# Patient Record
Sex: MALE | Race: WHITE | Employment: OTHER | URBAN - METROPOLITAN AREA
[De-identification: names, ages, dates, MRNs, and addresses within clinical notes are randomized per-mention and may not be internally consistent; named-entity substitution may affect disease eponyms.]

---

## 2017-01-23 ENCOUNTER — OFFICE VISIT (OUTPATIENT)
Dept: INTERNAL MEDICINE CLINIC | Age: 67
End: 2017-01-23

## 2017-01-23 VITALS
HEART RATE: 105 BPM | OXYGEN SATURATION: 96 % | BODY MASS INDEX: 33.06 KG/M2 | TEMPERATURE: 97.5 F | SYSTOLIC BLOOD PRESSURE: 110 MMHG | DIASTOLIC BLOOD PRESSURE: 78 MMHG | WEIGHT: 210.6 LBS | RESPIRATION RATE: 14 BRPM | HEIGHT: 67 IN

## 2017-01-23 DIAGNOSIS — E78.5 HYPERLIPIDEMIA, UNSPECIFIED HYPERLIPIDEMIA TYPE: ICD-10-CM

## 2017-01-23 DIAGNOSIS — J44.9 CHRONIC OBSTRUCTIVE PULMONARY DISEASE, UNSPECIFIED COPD TYPE (HCC): ICD-10-CM

## 2017-01-23 DIAGNOSIS — F41.9 ANXIETY: ICD-10-CM

## 2017-01-23 DIAGNOSIS — J06.9 UPPER RESPIRATORY TRACT INFECTION, UNSPECIFIED TYPE: ICD-10-CM

## 2017-01-23 DIAGNOSIS — I10 ESSENTIAL HYPERTENSION: Primary | ICD-10-CM

## 2017-01-23 DIAGNOSIS — E03.9 HYPOTHYROIDISM, UNSPECIFIED TYPE: ICD-10-CM

## 2017-01-23 PROCEDURE — 99214 OFFICE O/P EST MOD 30 MIN: CPT | Performed by: INTERNAL MEDICINE

## 2017-01-23 RX ORDER — BROMPHENIRAMINE MALEATE, PSEUDOEPHEDRINE HYDROCHLORIDE, AND DEXTROMETHORPHAN HYDROBROMIDE 2; 30; 10 MG/5ML; MG/5ML; MG/5ML
5 SYRUP ORAL 4 TIMES DAILY PRN
Qty: 120 ML | Refills: 0 | Status: SHIPPED | OUTPATIENT
Start: 2017-01-23 | End: 2017-04-24 | Stop reason: ALTCHOICE

## 2017-01-23 RX ORDER — METHYLPREDNISOLONE 4 MG/1
TABLET ORAL
Qty: 1 KIT | Refills: 0 | Status: SHIPPED | OUTPATIENT
Start: 2017-01-23 | End: 2017-04-24 | Stop reason: ALTCHOICE

## 2017-01-23 ASSESSMENT — PATIENT HEALTH QUESTIONNAIRE - PHQ9
SUM OF ALL RESPONSES TO PHQ QUESTIONS 1-9: 0
2. FEELING DOWN, DEPRESSED OR HOPELESS: 0
SUM OF ALL RESPONSES TO PHQ9 QUESTIONS 1 & 2: 0
1. LITTLE INTEREST OR PLEASURE IN DOING THINGS: 0

## 2017-01-24 RX ORDER — AMLODIPINE BESYLATE AND BENAZEPRIL HYDROCHLORIDE 10; 20 MG/1; MG/1
CAPSULE ORAL
Qty: 90 CAPSULE | Refills: 2 | Status: SHIPPED | OUTPATIENT
Start: 2017-01-24 | End: 2017-10-06 | Stop reason: SDUPTHER

## 2017-01-26 ASSESSMENT — ENCOUNTER SYMPTOMS
CONSTIPATION: 0
SHORTNESS OF BREATH: 0
CHEST TIGHTNESS: 0
SORE THROAT: 0
NAUSEA: 0
COUGH: 0
DIARRHEA: 0
RHINORRHEA: 0
WHEEZING: 0
ABDOMINAL PAIN: 0
VOMITING: 0

## 2017-02-10 LAB
AGE TIME: 66 YRS
ALBUMIN SERPL-MCNC: 3.9 G/DL
ALP BLD-CCNC: 124 U/L
ALT SERPL-CCNC: 32 U/L
ANION GAP SERPL CALCULATED.3IONS-SCNC: 11 MMOL/L
AST SERPL-CCNC: 28 U/L
BILIRUB SERPL-MCNC: 0.55 MG/DL
BUN BLDV-MCNC: 19 MG/DL
CALCIUM SERPL-MCNC: 9.2 MG/DL
CHLORIDE BLD-SCNC: 104 MMOL/L
CHOLESTEROL, TOTAL: 201 MG/DL
CO2: 26.6 MMOL/L
COMPREHENSIVE METABOLIC PANEL: ABNORMAL
CREAT SERPL-MCNC: 1.1 MG/DL
GFR AFRICAN AMERICAN: 81 ML/MIN
GFR NON-AFRICAN AMERICAN: 67 ML/MIN
GLUCOSE BLD-MCNC: 92 MG/DL
HDLC SERPL-MCNC: 86 MG/DL
LDL CHOLESTEROL DIRECT: 62 MG/DL
LIPID PANEL: ABNORMAL
POTASSIUM SERPL-SCNC: 4.2 MMOL/L
SODIUM BLD-SCNC: 142 MMOL/L
THYROTROPIN RELEASING HORMONE: 1.07 UIU/ML
TOTAL PROTEIN: 7.2 G/DL
TRIGL SERPL-MCNC: 264 MG/DL
VLDLC SERPL CALC-MCNC: 53 MG/DL

## 2017-03-01 RX ORDER — ATORVASTATIN CALCIUM 40 MG/1
40 TABLET, FILM COATED ORAL NIGHTLY
Qty: 30 TABLET | Refills: 3 | Status: SHIPPED | OUTPATIENT
Start: 2017-03-01 | End: 2017-07-05 | Stop reason: SDUPTHER

## 2017-03-04 RX ORDER — ALPRAZOLAM 1 MG/1
TABLET ORAL
Qty: 90 TABLET | Refills: 0 | Status: SHIPPED | OUTPATIENT
Start: 2017-03-04 | End: 2017-04-24 | Stop reason: SDUPTHER

## 2017-04-24 ENCOUNTER — OFFICE VISIT (OUTPATIENT)
Dept: INTERNAL MEDICINE CLINIC | Age: 67
End: 2017-04-24

## 2017-04-24 VITALS
SYSTOLIC BLOOD PRESSURE: 104 MMHG | TEMPERATURE: 97.5 F | HEART RATE: 88 BPM | RESPIRATION RATE: 14 BRPM | HEIGHT: 67 IN | DIASTOLIC BLOOD PRESSURE: 64 MMHG | OXYGEN SATURATION: 97 % | BODY MASS INDEX: 32.96 KG/M2 | WEIGHT: 210 LBS

## 2017-04-24 DIAGNOSIS — Z00.00 ROUTINE GENERAL MEDICAL EXAMINATION AT A HEALTH CARE FACILITY: Primary | ICD-10-CM

## 2017-04-24 DIAGNOSIS — J30.9 ALLERGIC RHINITIS, UNSPECIFIED ALLERGIC RHINITIS TRIGGER, UNSPECIFIED RHINITIS SEASONALITY: ICD-10-CM

## 2017-04-24 DIAGNOSIS — Z12.5 SCREENING PSA (PROSTATE SPECIFIC ANTIGEN): ICD-10-CM

## 2017-04-24 DIAGNOSIS — R94.31 ABNORMAL EKG: ICD-10-CM

## 2017-04-24 DIAGNOSIS — E78.5 HYPERLIPIDEMIA, UNSPECIFIED HYPERLIPIDEMIA TYPE: ICD-10-CM

## 2017-04-24 DIAGNOSIS — F41.9 ANXIETY: ICD-10-CM

## 2017-04-24 DIAGNOSIS — Z13.6 SCREENING, ISCHEMIC HEART DISEASE: ICD-10-CM

## 2017-04-24 DIAGNOSIS — Z23 NEED FOR TDAP VACCINATION: ICD-10-CM

## 2017-04-24 DIAGNOSIS — J44.9 CHRONIC OBSTRUCTIVE PULMONARY DISEASE, UNSPECIFIED COPD TYPE (HCC): ICD-10-CM

## 2017-04-24 DIAGNOSIS — I10 ESSENTIAL HYPERTENSION: ICD-10-CM

## 2017-04-24 DIAGNOSIS — E03.9 HYPOTHYROIDISM, UNSPECIFIED TYPE: ICD-10-CM

## 2017-04-24 LAB
BILIRUBIN, POC: NORMAL
BLOOD URINE, POC: NORMAL
CLARITY, POC: NORMAL
COLOR, POC: NORMAL
GLUCOSE URINE, POC: NORMAL
KETONES, POC: NORMAL
LEUKOCYTE EST, POC: NORMAL
NITRITE, POC: NORMAL
PH, POC: 7.5
PROTEIN, POC: NORMAL
SPECIFIC GRAVITY, POC: 1.02
UROBILINOGEN, POC: 0.2

## 2017-04-24 PROCEDURE — 99397 PER PM REEVAL EST PAT 65+ YR: CPT | Performed by: INTERNAL MEDICINE

## 2017-04-24 PROCEDURE — 93000 ELECTROCARDIOGRAM COMPLETE: CPT | Performed by: INTERNAL MEDICINE

## 2017-04-24 PROCEDURE — 81002 URINALYSIS NONAUTO W/O SCOPE: CPT | Performed by: INTERNAL MEDICINE

## 2017-04-24 RX ORDER — ALPRAZOLAM 1 MG/1
TABLET ORAL
Qty: 90 TABLET | Refills: 1 | Status: SHIPPED | OUTPATIENT
Start: 2017-04-24 | End: 2017-07-27 | Stop reason: SDUPTHER

## 2017-04-24 RX ORDER — LORATADINE 10 MG/1
10 TABLET ORAL DAILY
Qty: 30 TABLET | Refills: 1 | Status: SHIPPED | OUTPATIENT
Start: 2017-04-24 | End: 2017-11-13 | Stop reason: ALTCHOICE

## 2017-04-24 ASSESSMENT — ENCOUNTER SYMPTOMS
WHEEZING: 1
EYE ITCHING: 0
CHOKING: 0
SHORTNESS OF BREATH: 1
CONSTIPATION: 0
ABDOMINAL DISTENTION: 0
ABDOMINAL PAIN: 0
EYE PAIN: 0
FACIAL SWELLING: 0
CHEST TIGHTNESS: 0
VOICE CHANGE: 0
RHINORRHEA: 1
APNEA: 0
SORE THROAT: 0
DIARRHEA: 0
TROUBLE SWALLOWING: 0
EYE REDNESS: 0
PHOTOPHOBIA: 0
BACK PAIN: 0
VOMITING: 0
SINUS PRESSURE: 0
ANAL BLEEDING: 0
COUGH: 1
NAUSEA: 0
BLOOD IN STOOL: 0
RECTAL PAIN: 0
EYE DISCHARGE: 0

## 2017-05-22 RX ORDER — LEVOTHYROXINE SODIUM 0.12 MG/1
TABLET ORAL
Qty: 30 TABLET | Refills: 5 | Status: SHIPPED | OUTPATIENT
Start: 2017-05-22 | End: 2017-11-29 | Stop reason: SDUPTHER

## 2017-07-05 RX ORDER — ATORVASTATIN CALCIUM 40 MG/1
TABLET, FILM COATED ORAL
Qty: 30 TABLET | Refills: 3 | Status: SHIPPED | OUTPATIENT
Start: 2017-07-05 | End: 2018-02-13 | Stop reason: SDUPTHER

## 2017-07-27 ENCOUNTER — OFFICE VISIT (OUTPATIENT)
Dept: INTERNAL MEDICINE CLINIC | Age: 67
End: 2017-07-27

## 2017-07-27 VITALS
SYSTOLIC BLOOD PRESSURE: 120 MMHG | TEMPERATURE: 97.9 F | HEART RATE: 90 BPM | BODY MASS INDEX: 32.96 KG/M2 | HEIGHT: 67 IN | RESPIRATION RATE: 12 BRPM | DIASTOLIC BLOOD PRESSURE: 74 MMHG | WEIGHT: 210 LBS | OXYGEN SATURATION: 96 %

## 2017-07-27 DIAGNOSIS — F41.9 ANXIETY: ICD-10-CM

## 2017-07-27 DIAGNOSIS — E78.5 HYPERLIPIDEMIA, UNSPECIFIED HYPERLIPIDEMIA TYPE: ICD-10-CM

## 2017-07-27 DIAGNOSIS — E03.9 HYPOTHYROIDISM, UNSPECIFIED TYPE: ICD-10-CM

## 2017-07-27 DIAGNOSIS — Z02.83 ENCOUNTER FOR DRUG SCREENING: ICD-10-CM

## 2017-07-27 DIAGNOSIS — Z23 NEED FOR TDAP VACCINATION: ICD-10-CM

## 2017-07-27 DIAGNOSIS — I10 ESSENTIAL HYPERTENSION: Primary | ICD-10-CM

## 2017-07-27 PROCEDURE — 99214 OFFICE O/P EST MOD 30 MIN: CPT | Performed by: INTERNAL MEDICINE

## 2017-07-27 RX ORDER — ALPRAZOLAM 1 MG/1
TABLET ORAL
Qty: 75 TABLET | Refills: 2 | Status: SHIPPED | OUTPATIENT
Start: 2017-07-27 | End: 2017-11-29 | Stop reason: SDUPTHER

## 2017-07-27 RX ORDER — ALPRAZOLAM 1 MG/1
TABLET ORAL
Qty: 90 TABLET | Refills: 1 | Status: CANCELLED | OUTPATIENT
Start: 2017-07-27

## 2017-07-27 ASSESSMENT — ENCOUNTER SYMPTOMS
WHEEZING: 0
CONSTIPATION: 0
CHEST TIGHTNESS: 0
SORE THROAT: 0
NAUSEA: 0
DIARRHEA: 0
ABDOMINAL PAIN: 0
VOMITING: 0
TROUBLE SWALLOWING: 0
RHINORRHEA: 0
COUGH: 1
SHORTNESS OF BREATH: 0

## 2017-08-01 LAB
6-ACETYLMORPHINE: NOT DETECTED
7-AMINOCLONAZEPAM: NOT DETECTED
ALPHA-OH-ALPRAZOLAM: PRESENT
ALPRAZOLAM: PRESENT
AMPHETAMINE: NOT DETECTED
BARBITURATES: NOT DETECTED
BENZOYLECGONINE: NOT DETECTED
BUPRENORPHINE: NOT DETECTED
CARISOPRODOL: NOT DETECTED
CLONAZEPAM: NOT DETECTED
CODEINE: NOT DETECTED
CREATININE URINE: 185.4 MG/DL (ref 20–400)
DIAZEPAM: NOT DETECTED
DRUGS EXPECTED: NORMAL
EER PAIN MGT DRUG PANEL, HIGH RES/EMIT U: NORMAL
ETHYL GLUCURONIDE: PRESENT
FENTANYL: NOT DETECTED
HYDROCODONE: NOT DETECTED
HYDROMORPHONE: NOT DETECTED
LORAZEPAM: NOT DETECTED
MARIJUANA METABOLITE: NOT DETECTED
MDA: NOT DETECTED
MDEA: NOT DETECTED
MDMA URINE: NOT DETECTED
MEPERIDINE: NOT DETECTED
METHADONE: NOT DETECTED
METHAMPHETAMINE: NOT DETECTED
METHYLPHENIDATE: NOT DETECTED
MIDAZOLAM: NOT DETECTED
MORPHINE: NOT DETECTED
NORBUPRENORPHINE, FREE: NOT DETECTED
NORDIAZEPAM: NOT DETECTED
NORFENTANYL: NOT DETECTED
NORHYDROCODONE, URINE: NOT DETECTED
NOROXYCODONE: NOT DETECTED
NOROXYMORPHONE, URINE: NOT DETECTED
OXAZEPAM: NOT DETECTED
OXYCODONE: NOT DETECTED
OXYMORPHONE: NOT DETECTED
PAIN MANAGEMENT DRUG PANEL: NORMAL
PAIN MANAGEMENT DRUG PANEL: NORMAL
PCP: NOT DETECTED
PHENTERMINE: NOT DETECTED
PROPOXYPHENE: NOT DETECTED
TAPENTADOL, URINE: NOT DETECTED
TAPENTADOL-O-SULFATE, URINE: NOT DETECTED
TEMAZEPAM: NOT DETECTED
TRAMADOL: NOT DETECTED
ZOLPIDEM: NOT DETECTED

## 2017-10-09 RX ORDER — AMLODIPINE BESYLATE AND BENAZEPRIL HYDROCHLORIDE 10; 20 MG/1; MG/1
CAPSULE ORAL
Qty: 90 CAPSULE | Refills: 2 | Status: SHIPPED | OUTPATIENT
Start: 2017-10-09 | End: 2018-04-04 | Stop reason: ALTCHOICE

## 2017-11-07 ENCOUNTER — OFFICE VISIT (OUTPATIENT)
Dept: DERMATOLOGY | Age: 67
End: 2017-11-07

## 2017-11-07 DIAGNOSIS — R21 RASH: Primary | ICD-10-CM

## 2017-11-07 PROCEDURE — 11101 PR BIOPSY, EACH ADDED LESION: CPT | Performed by: DERMATOLOGY

## 2017-11-07 PROCEDURE — 11100 PR BIOPSY OF SKIN LESION: CPT | Performed by: DERMATOLOGY

## 2017-11-07 PROCEDURE — 99213 OFFICE O/P EST LOW 20 MIN: CPT | Performed by: DERMATOLOGY

## 2017-11-07 NOTE — PATIENT INSTRUCTIONS

## 2017-11-07 NOTE — PROGRESS NOTES
aluminum chloride. The wound(s) were dressed with petrolatum and covered with a bandage. Wound care instructions were reviewed. 2 Specimen (s) sent to pathology. The specimen bottles were appropriately labeled. We also reviewed the risks of bleeding, scar, and infection. Continue use of CeraVe cream.    Lidex cream twice daily to red scaly patches until improved.

## 2017-11-13 ENCOUNTER — OFFICE VISIT (OUTPATIENT)
Dept: INTERNAL MEDICINE CLINIC | Age: 67
End: 2017-11-13

## 2017-11-13 VITALS
TEMPERATURE: 97.6 F | OXYGEN SATURATION: 97 % | SYSTOLIC BLOOD PRESSURE: 120 MMHG | DIASTOLIC BLOOD PRESSURE: 70 MMHG | HEART RATE: 101 BPM | HEIGHT: 67 IN | RESPIRATION RATE: 14 BRPM | BODY MASS INDEX: 33.4 KG/M2 | WEIGHT: 212.8 LBS

## 2017-11-13 DIAGNOSIS — R53.83 FATIGUE, UNSPECIFIED TYPE: ICD-10-CM

## 2017-11-13 DIAGNOSIS — R14.0 ABDOMINAL DISTENSION: ICD-10-CM

## 2017-11-13 DIAGNOSIS — N48.89 PENILE PAIN: ICD-10-CM

## 2017-11-13 DIAGNOSIS — F41.9 ANXIETY: ICD-10-CM

## 2017-11-13 DIAGNOSIS — E03.9 HYPOTHYROIDISM, UNSPECIFIED TYPE: ICD-10-CM

## 2017-11-13 DIAGNOSIS — J44.9 CHRONIC OBSTRUCTIVE PULMONARY DISEASE, UNSPECIFIED COPD TYPE (HCC): ICD-10-CM

## 2017-11-13 DIAGNOSIS — I10 ESSENTIAL HYPERTENSION: Primary | ICD-10-CM

## 2017-11-13 DIAGNOSIS — R06.02 SHORTNESS OF BREATH: ICD-10-CM

## 2017-11-13 DIAGNOSIS — E78.2 MIXED HYPERLIPIDEMIA: ICD-10-CM

## 2017-11-13 DIAGNOSIS — K43.9 VENTRAL HERNIA WITHOUT OBSTRUCTION OR GANGRENE: ICD-10-CM

## 2017-11-13 DIAGNOSIS — R05.9 COUGH: ICD-10-CM

## 2017-11-13 DIAGNOSIS — R10.33 UMBILICAL PAIN: ICD-10-CM

## 2017-11-13 PROCEDURE — 99214 OFFICE O/P EST MOD 30 MIN: CPT | Performed by: INTERNAL MEDICINE

## 2017-11-13 RX ORDER — ALBUTEROL SULFATE 2.5 MG/3ML
2.5 SOLUTION RESPIRATORY (INHALATION) EVERY 6 HOURS PRN
COMMUNITY
End: 2021-12-16 | Stop reason: SDUPTHER

## 2017-11-13 RX ORDER — GUAIFENESIN AND DEXTROMETHORPHAN HYDROBROMIDE 600; 30 MG/1; MG/1
1 TABLET, EXTENDED RELEASE ORAL 2 TIMES DAILY
Qty: 20 TABLET | Refills: 0 | Status: SHIPPED | OUTPATIENT
Start: 2017-11-13 | End: 2018-04-04

## 2017-11-13 RX ORDER — BUDESONIDE AND FORMOTEROL FUMARATE DIHYDRATE 160; 4.5 UG/1; UG/1
2 AEROSOL RESPIRATORY (INHALATION) 2 TIMES DAILY
Qty: 1 INHALER | Refills: 1 | Status: SHIPPED | OUTPATIENT
Start: 2017-11-13 | End: 2018-01-26 | Stop reason: SDUPTHER

## 2017-11-13 NOTE — PATIENT INSTRUCTIONS
-chest xray  -CT abdomen pelvis  -Have fasting labs done  -Continue same medications  -Increase Symbicort HFA inhaler to 160/4.5mcg 2 puffs twice daily  -Start Medrol dose pack  -Mucinex DM 1 tablet 2 times daily for cough  -Follow up with Pulmonary  -Referral to General Surgery

## 2017-11-13 NOTE — PROGRESS NOTES
Jolene Malhotra   YOB: 1950    Date of Visit:  11/13/2017    Chief Complaint   Patient presents with    Anxiety    Hyperlipidemia     not fasting    Hypertension    Hypothyroidism    Cough     x 1 mo: feels pulling in abdomen (has hernia) and tip of penis when he coughs. Pulmonary appt next week       HPI  SOB for awhile since May. Pt has albuterol nebulizer 1-2 times per day. Pt c/o wheezing. Runny nose, congestion in this throat and cough  Uses Symbicort inhaler 2 times daily    Anxiety increased pending doom. Doesn't feel good  Can't lie flat if he doesn't use his nebulizer. If lying flat and coughs tip of penis tip mildly hurts with cough  Pt c/o extreme fatigue since May. Doesn't feel like doing anything    Abdomen distended  Shooting pain umbilical    Review of Systems   Constitutional: Positive for fatigue. Negative for chills and fever. HENT: Positive for rhinorrhea. Negative for congestion, postnasal drip and sore throat. Eyes: Negative for visual disturbance. Respiratory: Positive for cough, shortness of breath and wheezing. Negative for chest tightness. Cardiovascular: Negative for chest pain, palpitations and leg swelling. Gastrointestinal: Positive for abdominal distention and abdominal pain. Negative for constipation, diarrhea, nausea and vomiting. Genitourinary: Negative for dysuria, frequency and hematuria. Musculoskeletal: Negative for arthralgias and myalgias. Neurological: Negative for dizziness, syncope, light-headedness, numbness and headaches. Psychiatric/Behavioral: Negative for dysphoric mood and sleep disturbance. The patient is nervous/anxious.         Allergies   Allergen Reactions    Latex Rash    Contrast [Gadolinium Derivatives] Shortness Of Breath and Anxiety    Soap Rash     Outpatient Prescriptions Marked as Taking for the 11/13/17 encounter (Office Visit) with Josie Bain MD   Medication Sig Dispense Refill    albuterol (PROVENTIL) (2.5 MG/3ML) 0.083% nebulizer solution Take 2.5 mg by nebulization every 6 hours as needed for Wheezing or Shortness of Breath      fluocinonide (LIDEX) 0.05 % cream Apply to affected areas twice daily for up to 2 weeks or until improved. 60 g 3    amLODIPine-benazepril (LOTREL) 10-20 MG per capsule TAKE 1 CAPSULE BY MOUTH DAILY. 90 capsule 2    ALPRAZolam (XANAX) 1 MG tablet TAKE 1 TABLET BY MOUTH 3 TIMES A DAY AS NEEDED FOR ANXIETY 75 tablet 2    atorvastatin (LIPITOR) 40 MG tablet TAKE 1 TABLET BY MOUTH NIGHTLY 30 tablet 3    levothyroxine (SYNTHROID) 125 MCG tablet TAKE 1 TABLET BY MOUTH DAILY 30 tablet 5    triamcinolone (KENALOG) 0.1 % cream Apply to affecteds area twice daily for up to 2 weeks or until improved. For eczema. 80 g 2    ibuprofen (ADVIL;MOTRIN) 800 MG tablet Take 1 tablet by mouth every 8 hours as needed 60 tablet 0    ascorbic acid (VITAMIN C) 500 MG tablet Take 500 mg by mouth Twice a Week       nicotine (NICODERM CQ) 21 MG/24HR Place 1 patch onto the skin every 24 hours      Multiple Vitamins-Minerals (CENTRUM SILVER ADULT 50+ PO) Take 1 tablet by mouth daily      aspirin 81 MG tablet Take 1 tablet by mouth daily. 30 tablet 5    Budesonide-Formoterol Fumarate (SYMBICORT) 80-4.5 MCG/ACT AERO Inhale 1 puff into the lungs 2 times daily.  albuterol (PROAIR HFA) 108 (90 BASE) MCG/ACT inhaler Inhale 2 puffs into the lungs every 6 hours as needed. Vitals:    11/13/17 1055   BP: 120/70   Site: Right Arm   Position: Sitting   Cuff Size: Large Adult   Pulse: 101   Resp: 14   Temp: 97.6 °F (36.4 °C)   TempSrc: Oral   SpO2: 97%   Weight: 212 lb 12.8 oz (96.5 kg)   Height: 5' 7\" (1.702 m)     Body mass index is 33.33 kg/m². Physical Exam   Constitutional: He appears well-developed and well-nourished. No distress.    Obese elderly WM   HENT:   Right Ear: Tympanic membrane and ear canal normal.   Left Ear: Tympanic membrane and ear canal normal.   Nose: Right sinus exhibits no maxillary sinus tenderness. Left sinus exhibits no maxillary sinus tenderness. Mouth/Throat: Oropharynx is clear and moist and mucous membranes are normal.   Eyes: Conjunctivae, EOM and lids are normal. Pupils are equal, round, and reactive to light. Neck: Neck supple. Carotid bruit is not present. No thyromegaly present. Cardiovascular: Normal rate, regular rhythm, S1 normal, S2 normal and normal heart sounds. Exam reveals no gallop and no friction rub. No murmur heard. Pulmonary/Chest: Effort normal and breath sounds normal. No respiratory distress. He has no wheezes. He has no rhonchi. He has no rales. Abdominal: Soft. Normal appearance and bowel sounds are normal. He exhibits no distension. There is no hepatosplenomegaly. There is tenderness in the periumbilical area. There is no rebound and no guarding. A hernia is present. Hernia confirmed positive in the ventral area. Hernia confirmed negative in the right inguinal area and confirmed negative in the left inguinal area. Genitourinary: Testes normal and penis normal.   Musculoskeletal: He exhibits no edema. Lymphadenopathy:        Head (right side): No submandibular adenopathy present. Head (left side): No submandibular adenopathy present. Psychiatric: He has a normal mood and affect. Nursing note reviewed. Assessment/Plan     1. Essential hypertension    - Comprehensive Metabolic Panel; Future    2. Anxiety      3. Hypothyroidism, unspecified type    - TSH without Reflex; Future    4. Mixed hyperlipidemia    - Comprehensive Metabolic Panel; Future  - Lipid Panel; Future    5. Shortness of breath    - XR CHEST STANDARD (2 VW); Future    6. Chronic obstructive pulmonary disease, unspecified COPD type (HCC)    - budesonide-formoterol (SYMBICORT) 160-4.5 MCG/ACT AERO; Inhale 2 puffs into the lungs 2 times daily  Dispense: 1 Inhaler; Refill: 1    7.  Fatigue, unspecified type    - CBC Auto Differential; Future  - Comprehensive Metabolic Panel; Future  - Vitamin B12; Future    8. Penile pain      9. Abdominal distension    - CT ABDOMEN PELVIS W IV CONTRAST; Future    10. Umbilical pain    - CT ABDOMEN PELVIS W IV CONTRAST; Future  - Taz Lopez MD    11. Cough    - Dextromethorphan-Guaifenesin (MUCINEX DM)  MG TB12; Take 1 tablet by mouth 2 times daily  Dispense: 20 tablet; Refill: 0    12. Ventral hernia without obstruction or gangrene  - Taz Lopez MD      Discussed medications with patient, who voiced understanding of their use and indications. All questions answered. Return in about 1 week (around 11/20/2017) for shortness of breath, abdominal distension, and results.

## 2017-11-14 ENCOUNTER — TELEPHONE (OUTPATIENT)
Dept: INTERNAL MEDICINE CLINIC | Age: 67
End: 2017-11-14

## 2017-11-14 NOTE — TELEPHONE ENCOUNTER
Bethanie from [facility] Scheduling called to request PA needed for CT of the Abdomen/Pelvis w/Contrast per patient insurance  Phone: [phone]  Fax: [phone]

## 2017-11-15 ENCOUNTER — TELEPHONE (OUTPATIENT)
Dept: DERMATOLOGY | Age: 67
End: 2017-11-15

## 2017-11-15 NOTE — TELEPHONE ENCOUNTER
Bethanie from [facility] calling back to check the status of the PA she called about yesterday. States she received call from patient this morning wanting to schedule for the Ct.   She advised that PA could be faxed to her attention @ [phone]

## 2017-11-15 NOTE — TELEPHONE ENCOUNTER
[employer] for the PA. Could not get the approval and it will be turned over to a Medicare Nurse for review. Called patient to let him know the PA for the CT scan is on hold for now.

## 2017-11-15 NOTE — TELEPHONE ENCOUNTER
Reviewed results of the biopsy with the patient. Date of biopsy: 11/7/17  Site of biopsy: R medial thigh  Result: Spongiotic dermatitis    Plan: Follow up in 3 months, pt scheduled. Date of biopsy: 11/7/17  Site of biopsy: L medial thigh  Result: Spongiotic dermatitis    Plan: Follow up in 3 months    The patient expressed understanding of the plan.

## 2017-11-19 ASSESSMENT — ENCOUNTER SYMPTOMS
NAUSEA: 0
ABDOMINAL PAIN: 1
VOMITING: 0
ABDOMINAL DISTENTION: 1
RHINORRHEA: 1
WHEEZING: 1
DIARRHEA: 0
SORE THROAT: 0
CONSTIPATION: 0
SHORTNESS OF BREATH: 1
COUGH: 1
CHEST TIGHTNESS: 0

## 2017-11-20 ENCOUNTER — OFFICE VISIT (OUTPATIENT)
Dept: INTERNAL MEDICINE CLINIC | Age: 67
End: 2017-11-20

## 2017-11-20 VITALS
TEMPERATURE: 97.9 F | RESPIRATION RATE: 12 BRPM | OXYGEN SATURATION: 97 % | SYSTOLIC BLOOD PRESSURE: 110 MMHG | BODY MASS INDEX: 33.34 KG/M2 | HEIGHT: 67 IN | WEIGHT: 212.4 LBS | DIASTOLIC BLOOD PRESSURE: 70 MMHG | HEART RATE: 98 BPM

## 2017-11-20 DIAGNOSIS — R53.83 FATIGUE, UNSPECIFIED TYPE: ICD-10-CM

## 2017-11-20 DIAGNOSIS — J44.9 CHRONIC OBSTRUCTIVE PULMONARY DISEASE, UNSPECIFIED COPD TYPE (HCC): ICD-10-CM

## 2017-11-20 DIAGNOSIS — R06.02 SHORTNESS OF BREATH: Primary | ICD-10-CM

## 2017-11-20 PROCEDURE — 99213 OFFICE O/P EST LOW 20 MIN: CPT | Performed by: INTERNAL MEDICINE

## 2017-11-20 NOTE — PROGRESS NOTES
Kinsey Rocha   YOB: 1950    Date of Visit:  11/20/2017    Chief Complaint   Patient presents with    Shortness of Breath    Other     abdominal distention       HPI  Pt has COPD. Pt still has SOB and wheezing. Pt states he was winded over the weekend. Pt states he didn't get out of bed on Saturday due to shortness of breath. Pt had the chest xray done. Pt states he has an appointment with his Pulmonologist, Dr. Sherie Flowers scheduled for 12 noon today. Pt didn't increase the dose of Symbicort as prescribed. Pt never picked up the Rx for Symbicort inhaler sent on 11/13/17. Pt states he didn't do the Medrol dose pack as recommended. Pt didn't take Mucinex for cough. Pt has abdominal distension and ventral hernia. Pt states he hasn't been able to have the CT abdomen pelvis done yet. Pt c/o fatigue. Pt is not exercising. Review of Systems   Constitutional: Positive for fatigue. Negative for chills and fever. HENT: Positive for rhinorrhea. Negative for congestion, postnasal drip and sore throat. Respiratory: Positive for cough, shortness of breath and wheezing. Negative for chest tightness. Cardiovascular: Negative for chest pain, palpitations and leg swelling. Gastrointestinal: Positive for abdominal distention and abdominal pain. Negative for constipation, diarrhea, nausea and vomiting. Neurological: Negative for dizziness and headaches.        Allergies   Allergen Reactions    Latex Rash    Contrast [Gadolinium Derivatives] Shortness Of Breath and Anxiety    Soap Rash     Outpatient Prescriptions Marked as Taking for the 11/20/17 encounter (Office Visit) with Ant Gonzalez MD   Medication Sig Dispense Refill    albuterol (PROVENTIL) (2.5 MG/3ML) 0.083% nebulizer solution Take 2.5 mg by nebulization every 6 hours as needed for Wheezing or Shortness of Breath      budesonide-formoterol (SYMBICORT) 160-4.5 MCG/ACT AERO Inhale 2 puffs into the lungs 2 times daily 1 Inhaler 1    Dextromethorphan-Guaifenesin (MUCINEX DM)  MG TB12 Take 1 tablet by mouth 2 times daily 20 tablet 0    fluocinonide (LIDEX) 0.05 % cream Apply to affected areas twice daily for up to 2 weeks or until improved. 60 g 3    amLODIPine-benazepril (LOTREL) 10-20 MG per capsule TAKE 1 CAPSULE BY MOUTH DAILY. 90 capsule 2    ALPRAZolam (XANAX) 1 MG tablet TAKE 1 TABLET BY MOUTH 3 TIMES A DAY AS NEEDED FOR ANXIETY 75 tablet 2    atorvastatin (LIPITOR) 40 MG tablet TAKE 1 TABLET BY MOUTH NIGHTLY 30 tablet 3    levothyroxine (SYNTHROID) 125 MCG tablet TAKE 1 TABLET BY MOUTH DAILY 30 tablet 5    triamcinolone (KENALOG) 0.1 % cream Apply to affecteds area twice daily for up to 2 weeks or until improved. For eczema. 80 g 2    ibuprofen (ADVIL;MOTRIN) 800 MG tablet Take 1 tablet by mouth every 8 hours as needed 60 tablet 0    ascorbic acid (VITAMIN C) 500 MG tablet Take 500 mg by mouth Twice a Week       nicotine (NICODERM CQ) 21 MG/24HR Place 1 patch onto the skin every 24 hours      Multiple Vitamins-Minerals (CENTRUM SILVER ADULT 50+ PO) Take 1 tablet by mouth daily      aspirin 81 MG tablet Take 1 tablet by mouth daily. 30 tablet 5    albuterol (PROAIR HFA) 108 (90 BASE) MCG/ACT inhaler Inhale 2 puffs into the lungs every 6 hours as needed. Vitals:    11/20/17 0851   BP: 110/70   Site: Right Arm   Position: Sitting   Cuff Size: Large Adult   Pulse: 98   Resp: 12   Temp: 97.9 °F (36.6 °C)   TempSrc: Oral   SpO2: 97%   Weight: 212 lb 6.4 oz (96.3 kg)   Height: 5' 7\" (1.702 m)     Body mass index is 33.27 kg/m². Physical Exam   Constitutional: He appears well-developed and well-nourished. No distress.    Obese elderly WM   HENT:   Right Ear: Tympanic membrane and ear canal normal.   Left Ear: Tympanic membrane and ear canal normal.   Mouth/Throat: Oropharynx is clear and moist and mucous membranes are normal.   Eyes: Conjunctivae, EOM and lids are normal. Pupils are equal, round, 10^3/uL Final 11/14/2017  9:41 AM [facility]   Absolute Mono # 0.9   0.0 - 0.8 10^3/uL Final 11/14/2017  9:41 AM [facility]   Absolute Eos # 0.2  0.0 - 0.5 10^3/uL Final 11/14/2017  9:41 AM [facility]   Absolute Baso # 0.0  0.0 - 0.2 10^3/uL Final 11/14/2017  9:41 AM [facility]       Assessment/Plan     1. Shortness of breath  -Continue same medications  -Follow up with Pulmonary as scheduled     2. Chronic obstructive pulmonary disease, unspecified COPD type ([facility] [address]  -Continue same medications  -Follow up with Pulmonary as scheduled     3. Fatigue, unspecified type  -start over the counter vitamin B12 500mcg once daily  -Regular aerobic exercise        Return in about 4 weeks (around 12/18/2017) for fatigue .

## 2017-11-20 NOTE — PATIENT INSTRUCTIONS
-Continue same medications  -Follow up with Pulmonary  -start over the counter vitamin B12 500mcg once daily  -Regular aerobic exercise

## 2017-11-24 ENCOUNTER — TELEPHONE (OUTPATIENT)
Dept: INTERNAL MEDICINE CLINIC | Age: 67
End: 2017-11-24

## 2017-11-26 PROBLEM — R06.02 SHORTNESS OF BREATH: Status: ACTIVE | Noted: 2017-11-26

## 2017-11-26 PROBLEM — R53.83 FATIGUE: Status: ACTIVE | Noted: 2017-11-26

## 2017-11-26 ASSESSMENT — ENCOUNTER SYMPTOMS
SHORTNESS OF BREATH: 1
VOMITING: 0
ABDOMINAL DISTENTION: 1
WHEEZING: 1
COUGH: 1
SORE THROAT: 0
CHEST TIGHTNESS: 0
ABDOMINAL PAIN: 1
CONSTIPATION: 0
NAUSEA: 0
DIARRHEA: 0
RHINORRHEA: 1

## 2017-11-29 DIAGNOSIS — F41.9 ANXIETY: ICD-10-CM

## 2017-11-29 RX ORDER — LEVOTHYROXINE SODIUM 0.12 MG/1
TABLET ORAL
Qty: 30 TABLET | Refills: 3 | Status: SHIPPED | OUTPATIENT
Start: 2017-11-29 | End: 2018-03-25 | Stop reason: SDUPTHER

## 2017-11-29 RX ORDER — ALPRAZOLAM 1 MG/1
TABLET ORAL
Qty: 75 TABLET | Refills: 1 | Status: SHIPPED | OUTPATIENT
Start: 2017-11-29 | End: 2018-03-12 | Stop reason: SDUPTHER

## 2017-11-30 NOTE — TELEPHONE ENCOUNTER
Rx's refilled. Controlled Substances Monitoring:     Attestation: The Prescription Monitoring Report for this patient was reviewed today. Devin Adames MD)  Documentation: No signs of potential drug abuse or diversion identified.  Devin Adames MD)

## 2018-01-22 ENCOUNTER — OFFICE VISIT (OUTPATIENT)
Dept: INTERNAL MEDICINE CLINIC | Age: 68
End: 2018-01-22

## 2018-01-22 VITALS
RESPIRATION RATE: 16 BRPM | WEIGHT: 214.6 LBS | TEMPERATURE: 97.9 F | OXYGEN SATURATION: 96 % | SYSTOLIC BLOOD PRESSURE: 110 MMHG | BODY MASS INDEX: 33.68 KG/M2 | HEIGHT: 67 IN | HEART RATE: 96 BPM | DIASTOLIC BLOOD PRESSURE: 60 MMHG

## 2018-01-22 DIAGNOSIS — F41.1 GENERALIZED ANXIETY DISORDER: Primary | ICD-10-CM

## 2018-01-22 DIAGNOSIS — F41.0 PANIC ATTACKS: ICD-10-CM

## 2018-01-22 PROCEDURE — 99213 OFFICE O/P EST LOW 20 MIN: CPT | Performed by: INTERNAL MEDICINE

## 2018-01-22 RX ORDER — PAROXETINE 10 MG/1
10 TABLET, FILM COATED ORAL DAILY
Qty: 30 TABLET | Refills: 0 | Status: SHIPPED | OUTPATIENT
Start: 2018-01-22 | End: 2018-03-12

## 2018-01-22 ASSESSMENT — ENCOUNTER SYMPTOMS
SORE THROAT: 0
DIARRHEA: 0
COUGH: 0
CHEST TIGHTNESS: 0
NAUSEA: 0
VOMITING: 0
SHORTNESS OF BREATH: 0
RHINORRHEA: 0
CONSTIPATION: 0
ABDOMINAL PAIN: 0

## 2018-01-22 NOTE — PROGRESS NOTES
Leana Ma   YOB: 1950    Date of Visit:  1/22/2018    Chief Complaint   Patient presents with    Other     unsure of how to proceed after seeing the lung specialist.    Fatigue       HPI  Episode of SOB at [facility]. Pt states he was diagnosed with acute exacerbation of COPD and asthma. Pt states he has done 2 rounds of Prednisone and Albuterol nebulizer. Pt states he uses Symbicort inhaler twice daily. Pt c/o increased anxiety. Pt states he is always coughing,     Pt states he gets very panicky  Pt worries a lot    Xanax 1mg at 6pm , 8pm and 11pm--12 am    Review of Systems   Constitutional: Negative for chills and fever. HENT: Negative for congestion, postnasal drip, rhinorrhea and sore throat. Eyes: Negative for visual disturbance. Respiratory: Negative for cough, chest tightness and shortness of breath. Cardiovascular: Negative for chest pain, palpitations and leg swelling. Gastrointestinal: Negative for abdominal pain, constipation, diarrhea, nausea and vomiting. Genitourinary: Negative for dysuria and frequency. Neurological: Negative for dizziness, light-headedness and headaches.        Allergies   Allergen Reactions    Latex Rash    Contrast [Gadolinium Derivatives] Shortness Of Breath and Anxiety    Soap Rash     Outpatient Prescriptions Marked as Taking for the 1/22/18 encounter (Office Visit) with Eb Alonso MD   Medication Sig Dispense Refill    ALPRAZolam (XANAX) 1 MG tablet TAKE 1 TABLET BY MOUTH 3 TIMES A DAY AS NEEDED FOR ANXIETY 75 tablet 1    levothyroxine (SYNTHROID) 125 MCG tablet TAKE 1 TABLET BY MOUTH DAILY 30 tablet 3    albuterol (PROVENTIL) (2.5 MG/3ML) 0.083% nebulizer solution Take 2.5 mg by nebulization every 6 hours as needed for Wheezing or Shortness of Breath      budesonide-formoterol (SYMBICORT) 160-4.5 MCG/ACT AERO Inhale 2 puffs into the lungs 2 times daily 1 Inhaler 1    amLODIPine-benazepril (LOTREL) Dispense: 30 tablet; Refill: 0      Discussed medications with patient, who voiced understanding of their use and indications. All questions answered. Return in about 3 weeks (around 2/12/2018) for anxiety and panic attacks.

## 2018-01-22 NOTE — PATIENT INSTRUCTIONS
good friend or family member, or join a support group for people with similar problems. Talking to others sometimes relieves stress. · Get involved in social groups, or volunteer to help others. Being alone sometimes makes things seem worse than they are. · Get at least 30 minutes of exercise on most days of the week to relieve stress. Walking is a good choice. You also may want to do other activities, such as running, swimming, cycling, or playing tennis or team sports. Relaxation techniques  Do relaxation exercises 10 to 20 minutes a day. You can play soothing, relaxing music while you do them, if you wish. · Tell others in your house that you are going to do your relaxation exercises. Ask them not to disturb you. · Find a comfortable place, away from all distractions and noise. · Lie down on your back, or sit with your back straight. · Focus on your breathing. Make it slow and steady. · Breathe in through your nose. Breathe out through either your nose or mouth. · Breathe deeply, filling up the area between your navel and your rib cage. Breathe so that your belly goes up and down. · Do not hold your breath. · Breathe like this for 5 to 10 minutes. Notice the feeling of calmness throughout your whole body. As you continue to breathe slowly and deeply, relax by doing the following for another 5 to 10 minutes:  · Tighten and relax each muscle group in your body. You can begin at your toes and work your way up to your head. · Imagine your muscle groups relaxing and becoming heavy. · Empty your mind of all thoughts. · Let yourself relax more and more deeply. · Become aware of the state of calmness that surrounds you. · When your relaxation time is over, you can bring yourself back to alertness by moving your fingers and toes and then your hands and feet and then stretching and moving your entire body. Sometimes people fall asleep during relaxation, but they usually wake up shortly afterward.   · Always give yourself time to return to full alertness before you drive a car or do anything that might cause an accident if you are not fully alert. Never play a relaxation tape while you drive a car. When should you call for help? Call 911 anytime you think you may need emergency care. For example, call if:  ? · You feel you cannot stop from hurting yourself or someone else. ? Keep the numbers for these national suicide hotlines: 5-286-693-TALK (9-945.108.2090) and 3-971-FLTDXHW (2-936.367.1802). If you or someone you know talks about suicide or feeling hopeless, get help right away. ? Watch closely for changes in your health, and be sure to contact your doctor if:  ? · You have anxiety or fear that affects your life. ? · You have symptoms of anxiety that are new or different from those you had before. Where can you learn more? Go to https://[employer]. org and sign in to your [employer] account. Enter P754 in the [employer] box to learn more about \"Anxiety Disorder: Care Instructions. \"     If you do not have an account, please click on the \"Sign Up Now\" link. Current as of: May 12, 2017  Content Version: 11.5  © 5177-9391 [employer]. Care instructions adapted under license by [facility] ([facility]). If you have questions about a medical condition or this instruction, always ask your healthcare professional. [place] [address] any warranty or liability for your use of this information. Patient Education        Panic Attacks: Care Instructions  Your Care Instructions    During a panic attack, you may have a feeling of intense fear or terror, trouble breathing, chest pain or tightness, heartbeat changes, dizziness, sweating, and shaking. A panic attack starts suddenly and usually lasts from 5 to 20 minutes but may last even longer. You have the most anxiety about 10 minutes after the attack starts.  An attack can begin with a stressful event, or it can happen

## 2018-02-08 ENCOUNTER — TELEPHONE (OUTPATIENT)
Dept: INTERNAL MEDICINE CLINIC | Age: 68
End: 2018-02-08

## 2018-02-09 ENCOUNTER — TELEPHONE (OUTPATIENT)
Dept: SURGERY | Age: 68
End: 2018-02-09

## 2018-02-12 NOTE — TELEPHONE ENCOUNTER
Call pt. He has an appointment scheduled with me for tomorrow 2/13/18 and should keep it. If his SOB is worse he should go to the ER. He can also discuss his SOB with his Pulmonologist, Dr. Talia Macdonald.

## 2018-02-13 ENCOUNTER — OFFICE VISIT (OUTPATIENT)
Dept: INTERNAL MEDICINE CLINIC | Age: 68
End: 2018-02-13

## 2018-02-13 ENCOUNTER — OFFICE VISIT (OUTPATIENT)
Dept: SURGERY | Age: 68
End: 2018-02-13

## 2018-02-13 VITALS
HEIGHT: 68 IN | DIASTOLIC BLOOD PRESSURE: 64 MMHG | BODY MASS INDEX: 31.83 KG/M2 | SYSTOLIC BLOOD PRESSURE: 124 MMHG | WEIGHT: 210 LBS

## 2018-02-13 VITALS
DIASTOLIC BLOOD PRESSURE: 60 MMHG | HEIGHT: 67 IN | BODY MASS INDEX: 33.27 KG/M2 | SYSTOLIC BLOOD PRESSURE: 110 MMHG | WEIGHT: 212 LBS | HEART RATE: 96 BPM | OXYGEN SATURATION: 96 %

## 2018-02-13 DIAGNOSIS — R06.02 SHORTNESS OF BREATH: Primary | ICD-10-CM

## 2018-02-13 DIAGNOSIS — R10.30 LOWER ABDOMINAL PAIN: ICD-10-CM

## 2018-02-13 DIAGNOSIS — F41.9 ANXIETY: ICD-10-CM

## 2018-02-13 DIAGNOSIS — K43.9 VENTRAL HERNIA WITHOUT OBSTRUCTION OR GANGRENE: Primary | ICD-10-CM

## 2018-02-13 DIAGNOSIS — I10 ESSENTIAL HYPERTENSION: ICD-10-CM

## 2018-02-13 DIAGNOSIS — E03.9 HYPOTHYROIDISM, UNSPECIFIED TYPE: ICD-10-CM

## 2018-02-13 DIAGNOSIS — R53.83 FATIGUE, UNSPECIFIED TYPE: ICD-10-CM

## 2018-02-13 DIAGNOSIS — K42.9 UMBILICAL HERNIA WITHOUT OBSTRUCTION AND WITHOUT GANGRENE: ICD-10-CM

## 2018-02-13 DIAGNOSIS — E78.2 MIXED HYPERLIPIDEMIA: ICD-10-CM

## 2018-02-13 DIAGNOSIS — J42 CHRONIC BRONCHITIS, UNSPECIFIED CHRONIC BRONCHITIS TYPE (HCC): ICD-10-CM

## 2018-02-13 LAB
A/G RATIO: 2 (ref 1.1–2.2)
ALBUMIN SERPL-MCNC: 4.7 G/DL (ref 3.4–5)
ALP BLD-CCNC: 122 U/L (ref 40–129)
ALT SERPL-CCNC: 26 U/L (ref 10–40)
ANION GAP SERPL CALCULATED.3IONS-SCNC: 18 MMOL/L (ref 3–16)
AST SERPL-CCNC: 25 U/L (ref 15–37)
BASOPHILS ABSOLUTE: 0.1 K/UL (ref 0–0.2)
BASOPHILS RELATIVE PERCENT: 0.7 %
BILIRUB SERPL-MCNC: 0.5 MG/DL (ref 0–1)
BUN BLDV-MCNC: 17 MG/DL (ref 7–20)
CALCIUM SERPL-MCNC: 10 MG/DL (ref 8.3–10.6)
CHLORIDE BLD-SCNC: 101 MMOL/L (ref 99–110)
CO2: 24 MMOL/L (ref 21–32)
CREAT SERPL-MCNC: 0.9 MG/DL (ref 0.8–1.3)
EOSINOPHILS ABSOLUTE: 0.1 K/UL (ref 0–0.6)
EOSINOPHILS RELATIVE PERCENT: 0.6 %
GFR AFRICAN AMERICAN: >60
GFR NON-AFRICAN AMERICAN: >60
GLOBULIN: 2.4 G/DL
GLUCOSE BLD-MCNC: 110 MG/DL (ref 70–99)
HCT VFR BLD CALC: 50.7 % (ref 40.5–52.5)
HEMOGLOBIN: 16.9 G/DL (ref 13.5–17.5)
LYMPHOCYTES ABSOLUTE: 1.1 K/UL (ref 1–5.1)
LYMPHOCYTES RELATIVE PERCENT: 10 %
MCH RBC QN AUTO: 31.9 PG (ref 26–34)
MCHC RBC AUTO-ENTMCNC: 33.4 G/DL (ref 31–36)
MCV RBC AUTO: 95.6 FL (ref 80–100)
MONOCYTES ABSOLUTE: 1 K/UL (ref 0–1.3)
MONOCYTES RELATIVE PERCENT: 8.7 %
NEUTROPHILS ABSOLUTE: 9.1 K/UL (ref 1.7–7.7)
NEUTROPHILS RELATIVE PERCENT: 80 %
PDW BLD-RTO: 14 % (ref 12.4–15.4)
PLATELET # BLD: 210 K/UL (ref 135–450)
PMV BLD AUTO: 10.9 FL (ref 5–10.5)
POTASSIUM SERPL-SCNC: 4.5 MMOL/L (ref 3.5–5.1)
RBC # BLD: 5.3 M/UL (ref 4.2–5.9)
SODIUM BLD-SCNC: 143 MMOL/L (ref 136–145)
TOTAL PROTEIN: 7.1 G/DL (ref 6.4–8.2)
TSH SERPL DL<=0.05 MIU/L-ACNC: 2.29 UIU/ML (ref 0.27–4.2)
VITAMIN B-12: 415 PG/ML (ref 211–911)
WBC # BLD: 11.4 K/UL (ref 4–11)

## 2018-02-13 PROCEDURE — 99214 OFFICE O/P EST MOD 30 MIN: CPT | Performed by: INTERNAL MEDICINE

## 2018-02-13 PROCEDURE — 99204 OFFICE O/P NEW MOD 45 MIN: CPT | Performed by: SURGERY

## 2018-02-13 PROCEDURE — 81002 URINALYSIS NONAUTO W/O SCOPE: CPT | Performed by: INTERNAL MEDICINE

## 2018-02-13 RX ORDER — ATORVASTATIN CALCIUM 40 MG/1
TABLET, FILM COATED ORAL
Qty: 30 TABLET | Refills: 5 | Status: SHIPPED | OUTPATIENT
Start: 2018-02-13 | End: 2018-12-03 | Stop reason: SDUPTHER

## 2018-02-19 ASSESSMENT — ENCOUNTER SYMPTOMS
COUGH: 0
SHORTNESS OF BREATH: 1
WHEEZING: 1
CHEST TIGHTNESS: 0
RHINORRHEA: 0
DIARRHEA: 0
ABDOMINAL PAIN: 1
CONSTIPATION: 0
SORE THROAT: 0
NAUSEA: 0
VOMITING: 0

## 2018-02-21 ENCOUNTER — TELEPHONE (OUTPATIENT)
Dept: ADMINISTRATIVE | Age: 68
End: 2018-02-21

## 2018-02-22 ENCOUNTER — OFFICE VISIT (OUTPATIENT)
Dept: DERMATOLOGY | Age: 68
End: 2018-02-22

## 2018-02-22 DIAGNOSIS — L30.9 CHRONIC DERMATITIS: Primary | ICD-10-CM

## 2018-02-22 PROCEDURE — 99213 OFFICE O/P EST LOW 20 MIN: CPT | Performed by: DERMATOLOGY

## 2018-02-22 RX ORDER — FAMOTIDINE 10 MG
10 TABLET ORAL 2 TIMES DAILY
COMMUNITY
End: 2018-07-17

## 2018-02-22 RX ORDER — FLUTICASONE PROPIONATE 50 MCG
1 SPRAY, SUSPENSION (ML) NASAL DAILY
COMMUNITY
End: 2018-07-17

## 2018-03-12 ENCOUNTER — OFFICE VISIT (OUTPATIENT)
Dept: INTERNAL MEDICINE CLINIC | Age: 68
End: 2018-03-12

## 2018-03-12 VITALS
WEIGHT: 214.6 LBS | BODY MASS INDEX: 33.68 KG/M2 | OXYGEN SATURATION: 98 % | HEIGHT: 67 IN | SYSTOLIC BLOOD PRESSURE: 110 MMHG | HEART RATE: 100 BPM | DIASTOLIC BLOOD PRESSURE: 60 MMHG

## 2018-03-12 DIAGNOSIS — R60.9 EDEMA, UNSPECIFIED TYPE: ICD-10-CM

## 2018-03-12 DIAGNOSIS — R94.31 ABNORMAL EKG: ICD-10-CM

## 2018-03-12 DIAGNOSIS — R06.83 SNORING: ICD-10-CM

## 2018-03-12 DIAGNOSIS — Z23 NEED FOR TDAP VACCINATION: ICD-10-CM

## 2018-03-12 DIAGNOSIS — R73.03 PREDIABETES: ICD-10-CM

## 2018-03-12 DIAGNOSIS — R06.02 SHORTNESS OF BREATH: ICD-10-CM

## 2018-03-12 DIAGNOSIS — F41.0 PANIC ATTACK: ICD-10-CM

## 2018-03-12 DIAGNOSIS — F41.1 GENERALIZED ANXIETY DISORDER: Primary | ICD-10-CM

## 2018-03-12 LAB — HBA1C MFR BLD: 6 %

## 2018-03-12 PROCEDURE — 83036 HEMOGLOBIN GLYCOSYLATED A1C: CPT | Performed by: INTERNAL MEDICINE

## 2018-03-12 PROCEDURE — 99214 OFFICE O/P EST MOD 30 MIN: CPT | Performed by: INTERNAL MEDICINE

## 2018-03-12 PROCEDURE — 93000 ELECTROCARDIOGRAM COMPLETE: CPT | Performed by: INTERNAL MEDICINE

## 2018-03-12 RX ORDER — HYDROCHLOROTHIAZIDE 12.5 MG/1
12.5 CAPSULE, GELATIN COATED ORAL DAILY
Qty: 30 CAPSULE | Refills: 0 | Status: SHIPPED | OUTPATIENT
Start: 2018-03-12 | End: 2018-04-04 | Stop reason: ALTCHOICE

## 2018-03-12 RX ORDER — UMECLIDINIUM 62.5 UG/1
62.5 AEROSOL, POWDER ORAL DAILY
Refills: 11 | COMMUNITY
Start: 2018-02-19 | End: 2018-07-17

## 2018-03-12 RX ORDER — ALPRAZOLAM 1 MG/1
TABLET ORAL
Qty: 75 TABLET | Refills: 2 | Status: SHIPPED | OUTPATIENT
Start: 2018-03-12 | End: 2018-06-11

## 2018-03-12 ASSESSMENT — PATIENT HEALTH QUESTIONNAIRE - PHQ9
1. LITTLE INTEREST OR PLEASURE IN DOING THINGS: 0
SUM OF ALL RESPONSES TO PHQ QUESTIONS 1-9: 0
SUM OF ALL RESPONSES TO PHQ9 QUESTIONS 1 & 2: 0
2. FEELING DOWN, DEPRESSED OR HOPELESS: 0

## 2018-03-12 NOTE — PATIENT INSTRUCTIONS
-Continue same medications  -Referral to Cardiology  -Have a Tdap done at your pharmacy  -aspirin 81mg once daily  -HCTZ 12.5mg once daily  -elevate legs at rest

## 2018-03-12 NOTE — PROGRESS NOTES
Lona Castro   YOB: 1950    Date of Visit:  3/12/2018    Chief Complaint   Patient presents with    Other     Follow up    Injections     T-dap       HPI  Panic attack last Wednesday night on 3/7/18. Worries a lot    Tries to have everything perfect in his life. Pt states everything has to be perfect. SOB is ongoing comes and goes. Some days feels great and other days feels short and gasping for breath. Pt states he is short of breath getting out of bed. Cold weather also causes shortness of breath    Started on Incruse and Pepcid by Pulmonary    Told he snores loudly    c/o feet and ankle swelling daily. Pt states he has not been wearing tight socks due to swelling. Can control his breathing exacerbations with working on breathing exercises    Pt takes aspirin some nights but daily as recommended      Review of Systems   Constitutional: Negative for activity change, appetite change, chills, fatigue, fever and unexpected weight change. HENT: Negative for congestion, postnasal drip, rhinorrhea and sore throat. Eyes: Negative for visual disturbance. Respiratory: Positive for shortness of breath. Negative for cough and chest tightness. Cardiovascular: Positive for leg swelling. Negative for chest pain and palpitations. Gastrointestinal: Negative for abdominal pain, constipation, diarrhea, nausea and vomiting. Genitourinary: Negative for dysuria and frequency. Neurological: Negative for dizziness, light-headedness and headaches. Psychiatric/Behavioral: Negative for decreased concentration, dysphoric mood and sleep disturbance. The patient is nervous/anxious.         Allergies   Allergen Reactions    Latex Rash    Contrast [Gadolinium Derivatives] Shortness Of Breath and Anxiety    Soap Rash     Outpatient Prescriptions Marked as Taking for the 3/12/18 encounter (Office Visit) with Ander Norris MD   Medication Sig Dispense Refill    INCRUSE ELLIPTA 62.5 MCG/INH AEPB Medium Adult   Pulse: 100   SpO2: 98%   Weight: 214 lb 9.6 oz (97.3 kg)   Height: 5' 7\" (1.702 m)     Body mass index is 33.61 kg/m². Physical Exam   Constitutional: He appears well-developed and well-nourished. No distress. HENT:   Mouth/Throat: Oropharynx is clear and moist and mucous membranes are normal.   Eyes: Conjunctivae, EOM and lids are normal. Pupils are equal, round, and reactive to light. Neck: Neck supple. Carotid bruit is not present. No thyromegaly present. Cardiovascular: Normal rate, regular rhythm, S1 normal, S2 normal and normal heart sounds. Exam reveals no gallop and no friction rub. No murmur heard. Pulmonary/Chest: Effort normal and breath sounds normal. No respiratory distress. He has no wheezes. He has no rhonchi. He has no rales. Abdominal: Soft. Normal appearance and bowel sounds are normal. He exhibits no distension. There is no hepatosplenomegaly. There is no tenderness. Musculoskeletal: He exhibits no edema. Lymphadenopathy:        Head (right side): No submandibular adenopathy present. Head (left side): No submandibular adenopathy present. Psychiatric: He has a normal mood and affect. Nursing note reviewed. Results for POC orders placed in visit on 03/12/18   POCT glycosylated hemoglobin (Hb A1C)   Result Value Ref Range    Hemoglobin A1C 6.0 %       Assessment/Plan     1. Generalized anxiety disorder    - ALPRAZolam (XANAX) 1 MG tablet; TAKE 1 TABLET BY MOUTH 3 TIMES A DAY AS NEEDED FOR ANXIETY. Dispense: 75 tablet; Refill: 2    2. Panic attack    - ALPRAZolam (XANAX) 1 MG tablet; TAKE 1 TABLET BY MOUTH 3 TIMES A DAY AS NEEDED FOR ANXIETY. Dispense: 75 tablet; Refill: 2    3. Shortness of breath    - EKG 12 Lead    4. Prediabetes    - POCT glycosylated hemoglobin (Hb A1C)    5. Edema, unspecified type    - hydrochlorothiazide (MICROZIDE) 12.5 MG capsule; Take 1 capsule by mouth daily  Dispense: 30 capsule; Refill: 0    6.  Snoring    - [employer]

## 2018-03-16 ENCOUNTER — OFFICE VISIT (OUTPATIENT)
Dept: CARDIOLOGY CLINIC | Age: 68
End: 2018-03-16

## 2018-03-16 VITALS
HEART RATE: 70 BPM | BODY MASS INDEX: 33.2 KG/M2 | DIASTOLIC BLOOD PRESSURE: 60 MMHG | WEIGHT: 212 LBS | SYSTOLIC BLOOD PRESSURE: 100 MMHG

## 2018-03-16 DIAGNOSIS — I50.9 CONGESTIVE HEART FAILURE, UNSPECIFIED CONGESTIVE HEART FAILURE CHRONICITY, UNSPECIFIED CONGESTIVE HEART FAILURE TYPE: ICD-10-CM

## 2018-03-16 DIAGNOSIS — I10 ESSENTIAL HYPERTENSION: ICD-10-CM

## 2018-03-16 DIAGNOSIS — R94.31 ABNORMAL EKG: ICD-10-CM

## 2018-03-16 DIAGNOSIS — R06.02 SOB (SHORTNESS OF BREATH): Primary | ICD-10-CM

## 2018-03-16 PROCEDURE — 99204 OFFICE O/P NEW MOD 45 MIN: CPT | Performed by: INTERNAL MEDICINE

## 2018-03-16 RX ORDER — FUROSEMIDE 20 MG/1
20 TABLET ORAL 2 TIMES DAILY
Qty: 30 TABLET | Refills: 5 | Status: SHIPPED | OUTPATIENT
Start: 2018-03-16 | End: 2018-04-04 | Stop reason: SDUPTHER

## 2018-03-16 ASSESSMENT — ENCOUNTER SYMPTOMS
CHOKING: 0
CHEST TIGHTNESS: 0
SHORTNESS OF BREATH: 1
ABDOMINAL DISTENTION: 0
APNEA: 0
COUGH: 0

## 2018-03-16 NOTE — PROGRESS NOTES
Subjective:      Patient ID: Navi Howard is a 79 y.o. male. HPI  Referred for abn ekg/sob. 12/24 going [facility] had what felt to be asthma attack. Very sob. No chest pain. 7-8 episodes subsequently. Each last up to a day. Gets leg swelling. Has orthopnea. 2 pillows helps. Has some tachycardia. No syncope. Past Medical History:   Diagnosis Date    Allergic rhinitis     Anxiety     Asthma     COPD (chronic obstructive pulmonary disease) ([address]     Hyperlipidemia     Hypertension     Hypothyroidism     Soft tissue sarcoma (HCC)      Past Surgical History:   Procedure Laterality Date    COLONOSCOPY  8./16/2007    BN - 10 year f/u    SINUS SURGERY      SKIN CANCER EXCISION       Social History     Social History    Marital status:      Spouse name: N/A    Number of children: N/A    Years of education: N/A     Occupational History    Not on file. Social History Main Topics    Smoking status: Former Smoker     Years: 10.00     Types: Cigarettes     Quit date: 10/21/2013    Smokeless tobacco: Never Used    Alcohol use 0.6 oz/week     1 Cans of beer per week      Comment: rare    Drug use: No    Sexual activity: Not on file     Other Topics Concern    Not on file     Social History Narrative    No narrative on file     [facility] reviewed. GM with MI. Review of Systems   Constitutional: Positive for fatigue. Negative for activity change. Respiratory: Positive for shortness of breath. Negative for apnea, cough, choking and chest tightness. Cardiovascular: Positive for leg swelling. Negative for chest pain and palpitations. No PND or orthopnea. No tachycardia. Gastrointestinal: Negative for abdominal distention. Musculoskeletal: Negative for myalgias. Neurological: Negative for dizziness, syncope and light-headedness. Psychiatric/Behavioral: Negative for agitation, behavioral problems and confusion. other systems reviewed negative as done.      Objective: Physical Exam   Constitutional: He is oriented to person, place, and time. He appears well-developed and well-nourished. No distress. HENT:   Head: Normocephalic and atraumatic. Eyes: Conjunctivae and EOM are normal. Right eye exhibits no discharge. Left eye exhibits no discharge. Neck: Normal range of motion. Neck supple. No JVD present. Cardiovascular: Normal rate, regular rhythm, S1 normal, S2 normal and normal heart sounds. Exam reveals no gallop. No murmur heard. Pulses:       Radial pulses are 2+ on the right side, and 2+ on the left side. Pulmonary/Chest: Effort normal and breath sounds normal. No respiratory distress. He has no wheezes. He has no rales. Abdominal: Soft. Bowel sounds are normal. There is no tenderness. Musculoskeletal: Normal range of motion. He exhibits no edema. Neurological: He is alert and oriented to person, place, and time. Skin: Skin is warm and dry. Psychiatric: He has a normal mood and affect. Thought content normal.       Assessment:      1. SOB (shortness of breath)     2. Abnormal EKG     3. Essential hypertension     4. Congestive heart failure, unspecified congestive heart failure chronicity, unspecified congestive heart failure type [facility])             Plan:      Sx /edema consistent with CHF. EKG shows NSR, IVCD, Poor R wave progression, NSSTTW changes. Reviewed previous records and testing. Change to lasix 20 mg daily. EPl in l wk. Will have echo. Ischemic evaluation after compensated and review echo. Uses cocktail and beer nightly. Follow up after testing.

## 2018-03-19 ASSESSMENT — ENCOUNTER SYMPTOMS
SORE THROAT: 0
VOMITING: 0
COUGH: 0
DIARRHEA: 0
CHEST TIGHTNESS: 0
ABDOMINAL PAIN: 0
CONSTIPATION: 0
NAUSEA: 0
SHORTNESS OF BREATH: 1
RHINORRHEA: 0

## 2018-03-23 ENCOUNTER — TELEPHONE (OUTPATIENT)
Dept: CARDIOLOGY CLINIC | Age: 68
End: 2018-03-23

## 2018-03-23 NOTE — TELEPHONE ENCOUNTER
. Yon Rowell called stating he has been on his Lasix now for about 6 days. He says he is breathing better, but feet still swelling. He thought it would be making him go to the bathroom more also. He wonders if he should increase his dosage. Please call him about this. Thanks.

## 2018-03-23 NOTE — TELEPHONE ENCOUNTER
Spoke with pt. He does not weigh himself at all. Advised that he do so and keep a log; pt agreeable. He reports since starting the lasix 20 BID, his SOB and orthopnea has significantly improved. His BLE comes and goes. He is just questioning why his urine output has not increased. Offered to discuss with [facility]DW, but pt wants to wait until he gets his labs drawn on Mon before discussing with DDW. He also plans on getting the echo next week. Set up appt with [facility] as pt has a lot of questions (this writer answered a lot), but he is newly diagnosed CHF.

## 2018-03-26 RX ORDER — LEVOTHYROXINE SODIUM 0.12 MG/1
TABLET ORAL
Qty: 30 TABLET | Refills: 5 | Status: SHIPPED | OUTPATIENT
Start: 2018-03-26 | End: 2018-09-18 | Stop reason: DRUGHIGH

## 2018-04-02 ENCOUNTER — TELEPHONE (OUTPATIENT)
Dept: CARDIOLOGY CLINIC | Age: 68
End: 2018-04-02

## 2018-04-02 LAB
AGE TIME: 67 YRS
ANION GAP SERPL CALCULATED.3IONS-SCNC: 12 MMOL/L
B-TYPE NATRIURETIC PEPTIDE: 1019 PG/ML
BASIC METABOLIC PANEL: ABNORMAL
BUN BLDV-MCNC: 22 MG/DL
CALCIUM SERPL-MCNC: 9.5 MG/DL
CHLORIDE BLD-SCNC: 102 MMOL/L
CO2: 28.2 MMOL/L
CREAT SERPL-MCNC: 1.35 MG/DL
GFR AFRICAN AMERICAN: 64 ML/MIN
GFR NON-AFRICAN AMERICAN: 53 ML/MIN
GLUCOSE BLD-MCNC: 113 MG/DL
POTASSIUM SERPL-SCNC: 4.1 MMOL/L
SODIUM BLD-SCNC: 142 MMOL/L

## 2018-04-04 ENCOUNTER — OFFICE VISIT (OUTPATIENT)
Dept: CARDIOLOGY CLINIC | Age: 68
End: 2018-04-04

## 2018-04-04 VITALS
DIASTOLIC BLOOD PRESSURE: 76 MMHG | WEIGHT: 210.6 LBS | SYSTOLIC BLOOD PRESSURE: 118 MMHG | HEART RATE: 96 BPM | BODY MASS INDEX: 32.98 KG/M2

## 2018-04-04 DIAGNOSIS — R53.83 FATIGUE, UNSPECIFIED TYPE: ICD-10-CM

## 2018-04-04 DIAGNOSIS — I50.21 ACUTE SYSTOLIC CONGESTIVE HEART FAILURE (HCC): Primary | ICD-10-CM

## 2018-04-04 DIAGNOSIS — R60.0 LOCALIZED EDEMA: ICD-10-CM

## 2018-04-04 DIAGNOSIS — R06.02 SHORTNESS OF BREATH: ICD-10-CM

## 2018-04-04 DIAGNOSIS — I10 ESSENTIAL HYPERTENSION: ICD-10-CM

## 2018-04-04 PROCEDURE — 99214 OFFICE O/P EST MOD 30 MIN: CPT | Performed by: NURSE PRACTITIONER

## 2018-04-04 RX ORDER — LISINOPRIL 2.5 MG/1
2.5 TABLET ORAL DAILY
Qty: 30 TABLET | Refills: 3 | Status: SHIPPED | OUTPATIENT
Start: 2018-04-04 | End: 2019-05-06 | Stop reason: ALTCHOICE

## 2018-04-04 RX ORDER — FUROSEMIDE 40 MG/1
40 TABLET ORAL 2 TIMES DAILY
Qty: 60 TABLET | Refills: 3 | Status: SHIPPED | OUTPATIENT
Start: 2018-04-04 | End: 2018-04-06 | Stop reason: ALTCHOICE

## 2018-04-04 RX ORDER — POTASSIUM CHLORIDE 750 MG/1
10 TABLET, EXTENDED RELEASE ORAL DAILY
Qty: 30 TABLET | Refills: 3 | Status: SHIPPED | OUTPATIENT
Start: 2018-04-04

## 2018-04-04 RX ORDER — CARVEDILOL 3.12 MG/1
3.12 TABLET ORAL 2 TIMES DAILY WITH MEALS
Qty: 60 TABLET | Refills: 3 | Status: SHIPPED | OUTPATIENT
Start: 2018-04-04 | End: 2018-07-17

## 2018-04-04 ASSESSMENT — ENCOUNTER SYMPTOMS
ABDOMINAL PAIN: 0
WHEEZING: 1
RHINORRHEA: 1
COUGH: 1
DIARRHEA: 0
ABDOMINAL DISTENTION: 1
VOMITING: 0
NAUSEA: 0
SHORTNESS OF BREATH: 1

## 2018-04-06 ENCOUNTER — TELEPHONE (OUTPATIENT)
Dept: CARDIOLOGY CLINIC | Age: 68
End: 2018-04-06

## 2018-04-06 DIAGNOSIS — I50.21 ACUTE SYSTOLIC CONGESTIVE HEART FAILURE (HCC): Primary | ICD-10-CM

## 2018-04-06 LAB
AGE TIME: 67 YRS
ANION GAP SERPL CALCULATED.3IONS-SCNC: 9 MMOL/L
BASIC METABOLIC PANEL: ABNORMAL
BUN BLDV-MCNC: 25 MG/DL
CALCIUM SERPL-MCNC: 9.3 MG/DL
CHLORIDE BLD-SCNC: 101 MMOL/L
CO2: 28.5 MMOL/L
CREAT SERPL-MCNC: 1.45 MG/DL
GFR AFRICAN AMERICAN: 59 ML/MIN
GFR NON-AFRICAN AMERICAN: 49 ML/MIN
GLUCOSE BLD-MCNC: 103 MG/DL
MAGNESIUM: 1.9 MG/DL
POTASSIUM SERPL-SCNC: 4.1 MMOL/L
SODIUM BLD-SCNC: 138 MMOL/L

## 2018-04-06 RX ORDER — FUROSEMIDE 80 MG
80 TABLET ORAL 2 TIMES DAILY
Qty: 60 TABLET | Refills: 3 | Status: SHIPPED | OUTPATIENT
Start: 2018-04-06 | End: 2018-07-17 | Stop reason: ALTCHOICE

## 2018-04-08 DIAGNOSIS — R60.9 EDEMA, UNSPECIFIED TYPE: ICD-10-CM

## 2018-04-09 ENCOUNTER — TELEPHONE (OUTPATIENT)
Dept: CARDIOLOGY | Age: 68
End: 2018-04-09

## 2018-04-10 ENCOUNTER — TELEPHONE (OUTPATIENT)
Dept: CARDIOLOGY CLINIC | Age: 68
End: 2018-04-10

## 2018-04-10 LAB — B-TYPE NATRIURETIC PEPTIDE: 688 PG/ML

## 2018-04-11 ENCOUNTER — OFFICE VISIT (OUTPATIENT)
Dept: CARDIOLOGY CLINIC | Age: 68
End: 2018-04-11

## 2018-04-11 VITALS
SYSTOLIC BLOOD PRESSURE: 110 MMHG | HEART RATE: 88 BPM | BODY MASS INDEX: 31.58 KG/M2 | DIASTOLIC BLOOD PRESSURE: 58 MMHG | WEIGHT: 201.6 LBS

## 2018-04-11 DIAGNOSIS — Z01.812 PRE-PROCEDURE LAB EXAM: ICD-10-CM

## 2018-04-11 DIAGNOSIS — I10 ESSENTIAL HYPERTENSION: ICD-10-CM

## 2018-04-11 DIAGNOSIS — E78.2 MIXED HYPERLIPIDEMIA: ICD-10-CM

## 2018-04-11 DIAGNOSIS — R06.02 SOB (SHORTNESS OF BREATH): ICD-10-CM

## 2018-04-11 DIAGNOSIS — I50.21 ACUTE SYSTOLIC HEART FAILURE (HCC): ICD-10-CM

## 2018-04-11 DIAGNOSIS — I50.21 ACUTE SYSTOLIC CONGESTIVE HEART FAILURE (HCC): ICD-10-CM

## 2018-04-11 DIAGNOSIS — I50.21 ACUTE SYSTOLIC CONGESTIVE HEART FAILURE (HCC): Primary | ICD-10-CM

## 2018-04-11 DIAGNOSIS — R06.02 SHORTNESS OF BREATH: ICD-10-CM

## 2018-04-11 DIAGNOSIS — R60.0 LOCALIZED EDEMA: ICD-10-CM

## 2018-04-11 LAB
AGE TIME: 67 YRS
ANION GAP SERPL CALCULATED.3IONS-SCNC: 13 MMOL/L
ANION GAP SERPL CALCULATED.3IONS-SCNC: 18 MMOL/L (ref 3–16)
BASIC METABOLIC PANEL: ABNORMAL
BUN BLDV-MCNC: 26 MG/DL
BUN BLDV-MCNC: 31 MG/DL (ref 7–20)
CALCIUM SERPL-MCNC: 9.7 MG/DL
CALCIUM SERPL-MCNC: 9.7 MG/DL (ref 8.3–10.6)
CHLORIDE BLD-SCNC: 100 MMOL/L
CHLORIDE BLD-SCNC: 94 MMOL/L (ref 99–110)
CO2: 27 MMOL/L (ref 21–32)
CO2: 28.3 MMOL/L
CREAT SERPL-MCNC: 1.1 MG/DL (ref 0.8–1.3)
CREAT SERPL-MCNC: 1.4 MG/DL
GFR AFRICAN AMERICAN: 61 ML/MIN
GFR AFRICAN AMERICAN: >60
GFR NON-AFRICAN AMERICAN: 51 ML/MIN
GFR NON-AFRICAN AMERICAN: >60
GLUCOSE BLD-MCNC: 111 MG/DL
GLUCOSE BLD-MCNC: 99 MG/DL (ref 70–99)
HCT VFR BLD CALC: 51.9 % (ref 40.5–52.5)
HEMOGLOBIN: 17.7 G/DL (ref 13.5–17.5)
INR BLD: 0.87 (ref 0.85–1.15)
MCH RBC QN AUTO: 32 PG (ref 26–34)
MCHC RBC AUTO-ENTMCNC: 34.1 G/DL (ref 31–36)
MCV RBC AUTO: 93.9 FL (ref 80–100)
PDW BLD-RTO: 13.8 % (ref 12.4–15.4)
PLATELET # BLD: 206 K/UL (ref 135–450)
PMV BLD AUTO: 10.7 FL (ref 5–10.5)
POTASSIUM SERPL-SCNC: 3.7 MMOL/L
POTASSIUM SERPL-SCNC: 4.1 MMOL/L (ref 3.5–5.1)
PRO-BNP: 883 PG/ML (ref 0–124)
PROTHROMBIN TIME: 9.8 SEC (ref 9.6–13)
RBC # BLD: 5.52 M/UL (ref 4.2–5.9)
SODIUM BLD-SCNC: 139 MMOL/L (ref 136–145)
SODIUM BLD-SCNC: 141 MMOL/L
WBC # BLD: 8.3 K/UL (ref 4–11)

## 2018-04-11 PROCEDURE — 99213 OFFICE O/P EST LOW 20 MIN: CPT | Performed by: NURSE PRACTITIONER

## 2018-04-11 RX ORDER — DIPHENHYDRAMINE HCL 25 MG
25 CAPSULE ORAL EVERY 6 HOURS PRN
COMMUNITY
End: 2018-04-11 | Stop reason: SDUPTHER

## 2018-04-11 RX ORDER — PREDNISONE 20 MG/1
20 TABLET ORAL ONCE
Qty: 2 TABLET | Refills: 0 | Status: SHIPPED | OUTPATIENT
Start: 2018-04-11 | End: 2018-04-11

## 2018-04-11 RX ORDER — HYDROCHLOROTHIAZIDE 12.5 MG/1
CAPSULE, GELATIN COATED ORAL
Qty: 90 CAPSULE | Refills: 0 | OUTPATIENT
Start: 2018-04-11

## 2018-04-11 RX ORDER — DIPHENHYDRAMINE HCL 25 MG
25 CAPSULE ORAL ONCE
Qty: 1 CAPSULE | Refills: 0 | Status: SHIPPED | OUTPATIENT
Start: 2018-04-11 | End: 2018-04-11

## 2018-04-11 ASSESSMENT — ENCOUNTER SYMPTOMS
VOMITING: 0
DIARRHEA: 0
ABDOMINAL DISTENTION: 0
COUGH: 0
ABDOMINAL PAIN: 0
RHINORRHEA: 1
NAUSEA: 0
SHORTNESS OF BREATH: 1
WHEEZING: 0

## 2018-04-13 ENCOUNTER — TELEPHONE (OUTPATIENT)
Dept: CARDIOLOGY | Age: 68
End: 2018-04-13

## 2018-04-13 DIAGNOSIS — I50.21 ACUTE SYSTOLIC CONGESTIVE HEART FAILURE (HCC): Primary | ICD-10-CM

## 2018-04-16 ENCOUNTER — TELEPHONE (OUTPATIENT)
Dept: CARDIOLOGY CLINIC | Age: 68
End: 2018-04-16

## 2018-04-16 NOTE — TELEPHONE ENCOUNTER
Spoke with pt regarding orders being faxed to [facility] [place] called lab and confrimed they received the orders pt voiced understanding

## 2018-04-17 LAB
AGE TIME: 67 YRS
ANION GAP SERPL CALCULATED.3IONS-SCNC: 12 MMOL/L
BASIC METABOLIC PANEL: ABNORMAL
BUN BLDV-MCNC: 22 MG/DL
CALCIUM SERPL-MCNC: 9.9 MG/DL
CHLORIDE BLD-SCNC: 102 MMOL/L
CO2: 29.6 MMOL/L
CREAT SERPL-MCNC: 1.25 MG/DL
GFR AFRICAN AMERICAN: 70 ML/MIN
GFR NON-AFRICAN AMERICAN: 58 ML/MIN
GLUCOSE BLD-MCNC: 122 MG/DL
POTASSIUM SERPL-SCNC: 3.9 MMOL/L
SODIUM BLD-SCNC: 144 MMOL/L

## 2018-05-17 LAB
B-TYPE NATRIURETIC PEPTIDE: 1026 PG/ML
BUN BLDV-MCNC: 36 MG/DL
CALCIUM SERPL-MCNC: 9.3 MG/DL
CHLORIDE BLD-SCNC: 99 MMOL/L
CO2: 32.9 MMOL/L
CREAT SERPL-MCNC: 1.61 MG/DL
GFR CALCULATED: NORMAL
GLUCOSE BLD-MCNC: 107 MG/DL
POTASSIUM SERPL-SCNC: 4.1 MMOL/L
SODIUM BLD-SCNC: 137 MMOL/L

## 2018-05-21 ENCOUNTER — TELEPHONE (OUTPATIENT)
Dept: ADMINISTRATIVE | Age: 68
End: 2018-05-21

## 2018-05-22 DIAGNOSIS — J44.9 CHRONIC OBSTRUCTIVE PULMONARY DISEASE, UNSPECIFIED COPD TYPE (HCC): ICD-10-CM

## 2018-05-23 RX ORDER — BUDESONIDE AND FORMOTEROL FUMARATE DIHYDRATE 160; 4.5 UG/1; UG/1
2 AEROSOL RESPIRATORY (INHALATION) 2 TIMES DAILY
Qty: 10.2 INHALER | Refills: 3 | Status: SHIPPED | OUTPATIENT
Start: 2018-05-23 | End: 2018-12-11 | Stop reason: SDUPTHER

## 2018-06-26 ENCOUNTER — CARE COORDINATION (OUTPATIENT)
Dept: CARE COORDINATION | Age: 68
End: 2018-06-26

## 2018-06-26 ENCOUNTER — OFFICE VISIT (OUTPATIENT)
Dept: INTERNAL MEDICINE CLINIC | Age: 68
End: 2018-06-26

## 2018-06-26 VITALS
HEIGHT: 67 IN | WEIGHT: 192 LBS | HEART RATE: 60 BPM | DIASTOLIC BLOOD PRESSURE: 60 MMHG | OXYGEN SATURATION: 95 % | BODY MASS INDEX: 30.13 KG/M2 | SYSTOLIC BLOOD PRESSURE: 100 MMHG

## 2018-06-26 DIAGNOSIS — F10.10 ALCOHOL ABUSE: ICD-10-CM

## 2018-06-26 DIAGNOSIS — E03.9 ACQUIRED HYPOTHYROIDISM: ICD-10-CM

## 2018-06-26 DIAGNOSIS — Z23 NEED FOR TDAP VACCINATION: ICD-10-CM

## 2018-06-26 DIAGNOSIS — Z23 NEED FOR SHINGLES VACCINE: ICD-10-CM

## 2018-06-26 DIAGNOSIS — F41.1 GENERALIZED ANXIETY DISORDER: ICD-10-CM

## 2018-06-26 DIAGNOSIS — J31.0 RHINITIS, UNSPECIFIED CHRONICITY, UNSPECIFIED TYPE: ICD-10-CM

## 2018-06-26 DIAGNOSIS — R05.9 COUGH: Primary | ICD-10-CM

## 2018-06-26 DIAGNOSIS — E78.2 MIXED HYPERLIPIDEMIA: ICD-10-CM

## 2018-06-26 DIAGNOSIS — I42.9 CARDIOMYOPATHY, UNSPECIFIED TYPE (HCC): ICD-10-CM

## 2018-06-26 DIAGNOSIS — I10 ESSENTIAL HYPERTENSION: ICD-10-CM

## 2018-06-26 PROCEDURE — 99214 OFFICE O/P EST MOD 30 MIN: CPT | Performed by: INTERNAL MEDICINE

## 2018-06-26 RX ORDER — LORATADINE 10 MG/1
10 TABLET ORAL DAILY
Qty: 15 TABLET | Refills: 0 | Status: SHIPPED | OUTPATIENT
Start: 2018-06-26 | End: 2018-07-17

## 2018-06-26 RX ORDER — BENZONATATE 100 MG/1
100 CAPSULE ORAL 3 TIMES DAILY PRN
Qty: 30 CAPSULE | Refills: 0 | Status: SHIPPED | OUTPATIENT
Start: 2018-06-26 | End: 2018-07-03

## 2018-06-27 PROBLEM — F10.10 ALCOHOL ABUSE: Status: ACTIVE | Noted: 2018-06-27

## 2018-06-28 ENCOUNTER — CARE COORDINATION (OUTPATIENT)
Dept: CARE COORDINATION | Age: 68
End: 2018-06-28

## 2018-06-28 ASSESSMENT — ENCOUNTER SYMPTOMS
VOMITING: 0
NAUSEA: 0
SHORTNESS OF BREATH: 0
COUGH: 0
ABDOMINAL PAIN: 0
CHEST TIGHTNESS: 0
SORE THROAT: 0
DIARRHEA: 0
RHINORRHEA: 0
CONSTIPATION: 0
WHEEZING: 0

## 2018-06-29 LAB
AGE TIME: 67 YRS
ALBUMIN SERPL-MCNC: 3.8 G/DL
ALP BLD-CCNC: 130 U/L
ALT SERPL-CCNC: 28 U/L
ANION GAP SERPL CALCULATED.3IONS-SCNC: 7 MMOL/L
AST SERPL-CCNC: 25 U/L
BILIRUB SERPL-MCNC: 0.74 MG/DL
BUN BLDV-MCNC: 43 MG/DL
CALCIUM SERPL-MCNC: 9.2 MG/DL
CHLORIDE BLD-SCNC: 99 MMOL/L
CHOLESTEROL, TOTAL: 235 MG/DL
CO2: 31.9 MMOL/L
COMPREHENSIVE METABOLIC PANEL: ABNORMAL
CREAT SERPL-MCNC: 1.64 MG/DL
GFR AFRICAN AMERICAN: 51 ML/MIN
GFR NON-AFRICAN AMERICAN: 42 ML/MIN
GLUCOSE BLD-MCNC: 103 MG/DL
HDLC SERPL-MCNC: 62 MG/DL
LDL CHOLESTEROL DIRECT: 124 MG/DL
LIPID PANEL: ABNORMAL
POTASSIUM SERPL-SCNC: 4.1 MMOL/L
SODIUM BLD-SCNC: 138 MMOL/L
T4 FREE: 1.27 NG/DL
THYROTROPIN RELEASING HORMONE: 0.17 UIU/ML
TOTAL PROTEIN: 7.2 G/DL
TRIGL SERPL-MCNC: 247 MG/DL
VLDLC SERPL CALC-MCNC: 49 MG/DL

## 2018-07-03 ENCOUNTER — CARE COORDINATION (OUTPATIENT)
Dept: CARE COORDINATION | Age: 68
End: 2018-07-03

## 2018-07-03 NOTE — CARE COORDINATION
2nd call to pt. Chuy Lopezie Denny He states he erased all of his messages and thus did not know I had called previously. Note that patient was clearly intoxicated during this call. Pt states that he indeed wishes assistance with finding treatment for alcohol use disorder. Pt under two apparent misconceptions:about potential treatment:    1)Pt states belief that he must be \"comitted\" for 30 days to receive treatment. I explained to him that \"commitment\" is not involved and that almost all treatment these days (otehr than private pay) is intensive outpatient treatment following a brief inpatient detox. I explained that different programs operate differently, but that assessing him and contacting facilities in his insurance network [place] be the first step. 2)Pt states belief that his PCP and Cardiologist [facility]) have advised him to stop drinking immediately and told him that they will dismiss him as pt and withhold cardiac treatment respectively if he does not do so. He states that he had a medical event as recently as this past Sunday after attempting to abstain for a period of roughly 24 hours. I informed pt that I strongly suspect he misunderstood this situation. I also specifically advised that given his history and cardiac diagnosis I would NOT, under any circumstances, recommend that he attempt sudden, complete (cold turkey) abstinence prior to further evaluation and support. I told him that drinking LESS (he's currently consuming min of 70 portions of etoh weekly) would be fine, but cold turkey would not. Pt acknowledges that he often lies to his physicians about various aspects of his alcohol use, so my impression was that he was confused about which doctors he had spoken to about what and when. I arranged to go to pt's home in [facility] Friday , 7/6/18 to complete a formal alcohol use disorder assessment, including a SASSI-3.   I imagine that I will then need to spend considerable time

## 2018-07-06 ENCOUNTER — CARE COORDINATION (OUTPATIENT)
Dept: CARE COORDINATION | Age: 68
End: 2018-07-06

## 2018-07-09 ENCOUNTER — CARE COORDINATION (OUTPATIENT)
Dept: CARE COORDINATION | Age: 68
End: 2018-07-09

## 2018-07-09 NOTE — CARE COORDINATION
This is a 79 yr old white male who self-identifies as in need of substance use disorder treatment. He requests, via his ACC, assessment and assistance with referral to indicated treatment. 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 He is covered by an [employer]. Pt states that he generally consumes 2 shots of Zafar Gonsalez and 2-3 beers on a daily basis. This is not wholly congruent with his additional statement that he consumes a fifth of Zafar Gonsalez (approximately 17 shots) every five days. Pt often drinks directly from bottle, likely accounting for discrepancy. Pt's current alcohol consumption is likely between 50 and 70  portions of alcohol weekly. Pt states longest period of abstinence in past 6 years was slightly less than 48 hours. Reports first use of alcohol age 12. States historical use of amphetamines, marijuana, hypnotic, and hallucinogenic drugs. States last use of any non-alcohol drug was  (marijuana). Pt has one living brother (one ) currently living in the [facility] [place] This brother will soon be moving in with pt. Pt has one sister [facility]). Pt states that his brother, his late father, and a long time friend have all expressed concern to him about his alcohol use. Pt has had several episodes of alcohol tremors and several other physical manifestations of withdrawal in instances where he abstained for 24+ hours. Pt has an apparently serious cardiac disease, for which he is treated by Dr. Ronnlel Early at the [facility]. Pt states that Dr. Mika Mendez has told him that his cardiac disease is likely a consequence of long-term alcohol abuse. Also diagnosed with COPD, Anxiety Disorder (described as panic disorder in some portions of the medical record), and  essential hypertension,     Pt states he has taken benzodiazepines daily for decades to control above mentioned anxiety/panic.   Pt states no recent episodes of considered if and when he commences medical detox. It will be challenging to find an appropriate, available substance use disorder treatment facility in the pt's Medicare network and also geographically close to the pt. I will begin the process of identifying viable treatment providers and will hope to provide pt with information necessary for scheduling intake at time of my next visit to his home 7/10/18. Notified ACC/PCP.     Khushbu Yates

## 2018-07-10 ENCOUNTER — CARE COORDINATION (OUTPATIENT)
Dept: CARE COORDINATION | Age: 68
End: 2018-07-10

## 2018-07-11 ENCOUNTER — CARE COORDINATION (OUTPATIENT)
Dept: CARE COORDINATION | Age: 68
End: 2018-07-11

## 2018-07-11 NOTE — CARE COORDINATION
Home visit as scheduled. 1) Reviewed results of SASSI-3, including interpretation, which supports high probability of substance use disorder diagnosis    2)Reviewed information about recommended referral to [facility] addiction treatment program.    3)Obtained LEILA necessary to sending medical record to [facility] intake. I will fax these materials to [facility] tomorrow morning. 4)Encouraged pt to contact his cardiologist at [facility] (Dr. Enriqueta Lane), inform her of referral,  and have her contact me or [facility] should she have questions/input re this plan. Pt presented a moderate level of rationalized doubt about the need for a full course of treatment beyond detox. I reiterated to the pt that my recommendation is for him to complete both detox and intensive outpatient treatment of his long-standing substance use disorder. Pt reiterated that he has inaccurately reported his alcohol use to PCP on may occasions for fear that PCP would discontinue his benzodiazepine Rx. Pt continues daily alcohol use, although he reports it has been limited to one shot of whiskey and one beer daily for past several days. Medically supervised detox remains indicated.       Informed [facility]

## 2018-07-11 NOTE — CARE COORDINATION
1) Used information provided by pt to access his [employer] network information. There are only two facilities in his network that are appropriate for the care he needs and geographically accessible to him (although they are both about an hour from his home). 2)Phoned [facility] to attempt referral.  This facility informed me that they now have a policy that they will not accept any patient how is using benzodiazepines, via Rx or otherwise, until the pt is completely tapered from said medication and certified in writing as having been so tapered by the [address] [place] [address] MD.  This is not a realistic course of action for this pt. 3)Phoned [facility] addiciton treatment program, spoke with Kim Camargo in intake, who made a provisional determination that the pt was suitable for detox/CD treatment. Planned to visit pt's home tomorrow, obtain necessary LEILA, and send [facility] all requested background documents for next steps.     Informed [facility]

## 2018-07-13 ENCOUNTER — CARE COORDINATION (OUTPATIENT)
Dept: CARE COORDINATION | Age: 68
End: 2018-07-13

## 2018-07-13 NOTE — CARE COORDINATION
Call to patient at home number; left message on voice mail with [facility] contact information asking for call back. Called mobile number in Epic, number disconnected.

## 2018-07-16 ENCOUNTER — CARE COORDINATION (OUTPATIENT)
Dept: CARE COORDINATION | Age: 68
End: 2018-07-16

## 2018-07-16 NOTE — CARE COORDINATION
Pt called me directly and stated that he wishes to withdraw any previously authorized release of PHI to [facility], to his cardiologist Dr Filippo Alvarez at the [facility], or to his PCP Dr. Ida Slade. I informed pt that I would immediately void the releases he signed for [facility] and  for Dr. Mika Oro. I clarified that Dr. Ida Slade and I are both part of the [facility]) system and thus no release exists or is needed for me to share information with her. I informed pt that my assessment had of course previously been sent to [facility] as per pt's previous instructions, but that no information had been sent to Dr. Mika Oro. I reiterated that releases to [facility] and to Dr. Mika Oro would be voided immediately. Pt states that he does not want his PCP informed that pt is alcohol dependent. Pt states that he has not used alcohol for several days, has had no withdrawal effects, and that this fact indicates my recent assessment and treatment recommendations were incorrect. I informed pt that my assessment and recommendations, to both pt and to pt's PCP, would not be changed. Pt stated desire to contact my superior. I provided patient with name and phone number of my supervisor, Leora Pina RN. Notified ACC and PCP.     Jakub Gaytan

## 2018-07-16 NOTE — CARE COORDINATION
Cecelia Mullins from [facility] phoned me due to a few discrepancies in pt's insurance information for purposes of arranging admission. I phoned pt and then called Cecelia Mullins back, resolving these issues. Cecelia Mullins stated that she would phone pt this afternoon to arrange admission date. I then spoke to pt and confirmed that he would be available to take a call from [facility] some time during the next hour in order to arrange an admission date. Pt stated that he would contact me if there were any further snags or if/when he had a confirmed admission date.   Informed [facility]

## 2018-07-16 NOTE — CARE COORDINATION
Call from [facility] intake department. Informing that contrary to last contact with [facility] and pt (7/11/18), pt has not responded to several phone calls from [facility] to arrange admission date. [facility] expresses concern about pt's well being. I inform [facility] that I will pursue location of pt and clarification of  His intentions.     Laurie Dillard

## 2018-07-17 ENCOUNTER — CARE COORDINATION (OUTPATIENT)
Dept: CARE COORDINATION | Age: 68
End: 2018-07-17

## 2018-07-17 ENCOUNTER — OFFICE VISIT (OUTPATIENT)
Dept: INTERNAL MEDICINE CLINIC | Age: 68
End: 2018-07-17

## 2018-07-17 VITALS
BODY MASS INDEX: 30.01 KG/M2 | WEIGHT: 191.2 LBS | SYSTOLIC BLOOD PRESSURE: 98 MMHG | HEIGHT: 67 IN | HEART RATE: 97 BPM | OXYGEN SATURATION: 98 % | DIASTOLIC BLOOD PRESSURE: 70 MMHG

## 2018-07-17 DIAGNOSIS — F41.1 GENERALIZED ANXIETY DISORDER: ICD-10-CM

## 2018-07-17 DIAGNOSIS — F10.10 ALCOHOL ABUSE: Primary | ICD-10-CM

## 2018-07-17 DIAGNOSIS — M79.672 LEFT FOOT PAIN: ICD-10-CM

## 2018-07-17 PROCEDURE — 99214 OFFICE O/P EST MOD 30 MIN: CPT | Performed by: INTERNAL MEDICINE

## 2018-07-17 RX ORDER — POTASSIUM CHLORIDE 750 MG/1
10 TABLET, FILM COATED, EXTENDED RELEASE ORAL DAILY
Refills: 11 | COMMUNITY
Start: 2018-04-27 | End: 2019-12-23 | Stop reason: SDUPTHER

## 2018-07-17 RX ORDER — CARVEDILOL 3.12 MG/1
3.12 TABLET ORAL DAILY
COMMUNITY
Start: 2018-04-27 | End: 2019-05-06 | Stop reason: DRUGHIGH

## 2018-07-17 RX ORDER — ALPRAZOLAM 1 MG/1
1 TABLET ORAL 3 TIMES DAILY
COMMUNITY
Start: 2018-04-18 | End: 2018-08-17 | Stop reason: DRUGHIGH

## 2018-07-17 RX ORDER — TORSEMIDE 20 MG/1
80 TABLET ORAL DAILY
COMMUNITY
Start: 2018-06-28 | End: 2019-05-06 | Stop reason: DRUGHIGH

## 2018-07-17 NOTE — PATIENT INSTRUCTIONS
-Work on alcohol cessation  -Recommend AA  -consider alcohol treatment program  -Referral to Psychiatry

## 2018-07-20 ENCOUNTER — TELEPHONE (OUTPATIENT)
Dept: INTERNAL MEDICINE CLINIC | Age: 68
End: 2018-07-20

## 2018-07-21 LAB
6-ACETYLMORPHINE: NOT DETECTED
7-AMINOCLONAZEPAM: NOT DETECTED
ALPHA-OH-ALPRAZOLAM: PRESENT
ALPRAZOLAM: PRESENT
AMPHETAMINE: NOT DETECTED
BARBITURATES: NOT DETECTED
BENZOYLECGONINE: NOT DETECTED
BUPRENORPHINE: NOT DETECTED
CARISOPRODOL: NOT DETECTED
CLONAZEPAM: NOT DETECTED
CODEINE: NOT DETECTED
CREATININE URINE: 22.9 MG/DL (ref 20–400)
DIAZEPAM: NOT DETECTED
DRUGS EXPECTED: NORMAL
EER PAIN MGT DRUG PANEL, HIGH RES/EMIT U: NORMAL
ETHYL GLUCURONIDE: NOT DETECTED
FENTANYL: NOT DETECTED
HYDROCODONE: NOT DETECTED
HYDROMORPHONE: NOT DETECTED
LORAZEPAM: NOT DETECTED
MARIJUANA METABOLITE: NOT DETECTED
MDA: NOT DETECTED
MDEA: NOT DETECTED
MDMA URINE: NOT DETECTED
MEPERIDINE: NOT DETECTED
METHADONE: NOT DETECTED
METHAMPHETAMINE: NOT DETECTED
METHYLPHENIDATE: NOT DETECTED
MIDAZOLAM: NOT DETECTED
MORPHINE: NOT DETECTED
NORBUPRENORPHINE, FREE: NOT DETECTED
NORDIAZEPAM: NOT DETECTED
NORFENTANYL: NOT DETECTED
NORHYDROCODONE, URINE: NOT DETECTED
NOROXYCODONE: NOT DETECTED
NOROXYMORPHONE, URINE: NOT DETECTED
OXAZEPAM: NOT DETECTED
OXYCODONE: NOT DETECTED
OXYMORPHONE: NOT DETECTED
PAIN MANAGEMENT DRUG PANEL: NORMAL
PAIN MANAGEMENT DRUG PANEL: NORMAL
PCP: NOT DETECTED
PHENTERMINE: NOT DETECTED
PROPOXYPHENE: NOT DETECTED
TAPENTADOL, URINE: NOT DETECTED
TAPENTADOL-O-SULFATE, URINE: NOT DETECTED
TEMAZEPAM: NOT DETECTED
TRAMADOL: NOT DETECTED
ZOLPIDEM: NOT DETECTED

## 2018-07-24 ASSESSMENT — ENCOUNTER SYMPTOMS
CHEST TIGHTNESS: 0
RHINORRHEA: 0
ABDOMINAL PAIN: 0
SHORTNESS OF BREATH: 0
DIARRHEA: 0
NAUSEA: 0
COUGH: 0
SORE THROAT: 0
VOMITING: 0
CONSTIPATION: 0

## 2018-07-25 ENCOUNTER — CARE COORDINATION (OUTPATIENT)
Dept: CARE COORDINATION | Age: 68
End: 2018-07-25

## 2018-08-17 ENCOUNTER — OFFICE VISIT (OUTPATIENT)
Dept: INTERNAL MEDICINE CLINIC | Age: 68
End: 2018-08-17

## 2018-08-17 ENCOUNTER — CARE COORDINATION (OUTPATIENT)
Dept: CARE COORDINATION | Age: 68
End: 2018-08-17

## 2018-08-17 VITALS
BODY MASS INDEX: 29.54 KG/M2 | WEIGHT: 188.2 LBS | HEART RATE: 98 BPM | SYSTOLIC BLOOD PRESSURE: 128 MMHG | HEIGHT: 67 IN | OXYGEN SATURATION: 97 % | DIASTOLIC BLOOD PRESSURE: 72 MMHG

## 2018-08-17 DIAGNOSIS — Z01.84 IMMUNITY STATUS TESTING: ICD-10-CM

## 2018-08-17 DIAGNOSIS — E78.2 MIXED HYPERLIPIDEMIA: ICD-10-CM

## 2018-08-17 DIAGNOSIS — E03.9 ACQUIRED HYPOTHYROIDISM: ICD-10-CM

## 2018-08-17 DIAGNOSIS — F41.1 GENERALIZED ANXIETY DISORDER: ICD-10-CM

## 2018-08-17 DIAGNOSIS — F10.10 ALCOHOL ABUSE: Primary | ICD-10-CM

## 2018-08-17 DIAGNOSIS — I10 ESSENTIAL HYPERTENSION: ICD-10-CM

## 2018-08-17 LAB — TSH SERPL DL<=0.05 MIU/L-ACNC: 0.12 UIU/ML (ref 0.27–4.2)

## 2018-08-17 PROCEDURE — 99214 OFFICE O/P EST MOD 30 MIN: CPT | Performed by: INTERNAL MEDICINE

## 2018-08-17 RX ORDER — ALPRAZOLAM 1 MG/1
TABLET ORAL
Qty: 45 TABLET | Refills: 0 | Status: SHIPPED | OUTPATIENT
Start: 2018-08-17 | End: 2018-10-03 | Stop reason: SDUPTHER

## 2018-08-17 NOTE — PROGRESS NOTES
Urine 07/17/2018 Not Detected     MDA 07/17/2018 Not Detected     MDEA 07/17/2018 Not Detected     Methylphenidate 07/17/2018 Not Detected     Phentermine 07/17/2018 Not Detected     Benzoylecgonine 07/17/2018 Not Detected     Alprazolam 07/17/2018 Present     Alpha-OH-alprazolam 07/17/2018 Present     Clonazepam 07/17/2018 Not Detected     7-aminoclonazepam 07/17/2018 Not Detected     Diazepam 07/17/2018 Not Detected     NORDIAZEPAM 07/17/2018 Not Detected     OXAZEPAM 07/17/2018 Not Detected     TEMAZEPAM 07/17/2018 Not Detected     Lorazepam 07/17/2018 Not Detected     Midazolam 07/17/2018 Not Detected     Zolpidem 07/17/2018 Not Detected     Barbiturates 07/17/2018 Not Detected     Ethyl Glucuronide 07/17/2018 Not Detected     Marijuana Metabolite 07/17/2018 Not Detected     PCP 07/17/2018 Not Detected     CARISOPRODOL 07/17/2018 Not Detected     Pain Management Drug Pan* 07/17/2018 See Below     [facility] Pain Mgt Drug Panel,* 07/17/2018 See Note    Office Visit on 06/26/2018   Component Date Value    COMPREHENSIVE METABOLIC * 08/58/2687 NA     Sodium 06/29/2018 138     Potassium 06/29/2018 4.1     Chloride 06/29/2018 99     CO2 06/29/2018 31.9     Anion Gap 06/29/2018 7     POC Glucose 06/29/2018 103     BUN 06/29/2018 43*    CREATININE 06/29/2018 1.64*    Calcium 06/29/2018 9.2     Alb 06/29/2018 3.8     Total Protein 06/29/2018 7.2     Total Bilirubin 06/29/2018 0.74     Alkaline Phosphatase 06/29/2018 130*    AST 06/29/2018 25     ALT 06/29/2018 28     Age Time 06/29/2018 67     GFR Non- 06/29/2018 42     GFR  06/29/2018 51     LIPID PANEL 06/29/2018 NA     Cholesterol, Total 06/29/2018 235*    Triglycerides 06/29/2018 247*    HDL 06/29/2018 62     LDL Direct 06/29/2018 124     VLDL 06/29/2018 49     Thyrotropin Releasing Ho* 06/29/2018 0.17*    T4 Free 06/29/2018 1.27    Orders Only on 06/07/2018   Component Date Value    BASIC METABOLIC PANEL 72/03/5041 NA     Sodium 06/07/2018 139     Potassium 06/07/2018 3.8     Chloride 06/07/2018 99     CO2 06/07/2018 29.0     Anion Gap 06/07/2018 11     POC Glucose 06/07/2018 105     BUN 06/07/2018 38*    CREATININE 06/07/2018 1.58*    Calcium 06/07/2018 9.4     Age Time 06/07/2018 67     GFR Non- 06/07/2018 44     GFR  06/07/2018 53     BASIC METABOLIC PANEL 15/94/4038 NA     Sodium 06/15/2018 142     Potassium 06/15/2018 3.6     Chloride 06/15/2018 99     CO2 06/15/2018 32.2*    Anion Gap 06/15/2018 11     POC Glucose 06/15/2018 99     BUN 06/15/2018 51*    CREATININE 06/15/2018 1.60*    Calcium 06/15/2018 9.1     Age Time 06/15/2018 67     GFR Non- 06/15/2018 43     GFR  06/15/2018 52     BNP 06/15/2018 671     BNP 06/22/2018 864     BASIC METABOLIC PANEL 26/46/3691 NA     Sodium 06/22/2018 140     Potassium 06/22/2018 3.9     Chloride 06/22/2018 100     CO2 06/22/2018 31.0     Anion Gap 06/22/2018 9     POC Glucose 06/22/2018 106     BUN 06/22/2018 35*    CREATININE 06/22/2018 1.62*    Calcium 06/22/2018 9.4     Age Time 06/22/2018 67     GFR Non- 06/22/2018 43     GFR  06/22/2018 52     BNP 06/29/2018 672     BNP 07/05/2018 614     BASIC METABOLIC PANEL 62/37/9234 NA     Sodium 07/05/2018 140     Potassium 07/05/2018 3.9     Chloride 07/05/2018 99     CO2 07/05/2018 31.2     Anion Gap 07/05/2018 10     POC Glucose 07/05/2018 99     BUN 07/05/2018 34*    CREATININE 07/05/2018 1.57*    Calcium 07/05/2018 9.5     Age Time 07/05/2018 67     GFR Non- 07/05/2018 44     GFR  07/05/2018 54     BASIC METABOLIC PANEL 56/62/2526 NA     Sodium 07/13/2018 140     Potassium 07/13/2018 4.3     Chloride 07/13/2018 97*    CO2 07/13/2018 34.2*    Anion Gap 07/13/2018 9     POC Glucose 07/13/2018 108     BUN 07/13/2018 31*   

## 2018-08-20 LAB — VZV IGG SER QL IA: 561 IV

## 2018-09-05 ENCOUNTER — TELEPHONE (OUTPATIENT)
Dept: INTERNAL MEDICINE CLINIC | Age: 68
End: 2018-09-05

## 2018-09-17 ENCOUNTER — OFFICE VISIT (OUTPATIENT)
Dept: INTERNAL MEDICINE CLINIC | Age: 68
End: 2018-09-17

## 2018-09-17 VITALS
HEIGHT: 67 IN | OXYGEN SATURATION: 96 % | WEIGHT: 188.2 LBS | SYSTOLIC BLOOD PRESSURE: 110 MMHG | HEART RATE: 96 BPM | BODY MASS INDEX: 29.54 KG/M2 | DIASTOLIC BLOOD PRESSURE: 60 MMHG

## 2018-09-17 DIAGNOSIS — R23.3 EASY BRUISING: ICD-10-CM

## 2018-09-17 DIAGNOSIS — Z12.5 SCREENING PSA (PROSTATE SPECIFIC ANTIGEN): ICD-10-CM

## 2018-09-17 DIAGNOSIS — I50.22 CHRONIC SYSTOLIC CONGESTIVE HEART FAILURE (HCC): ICD-10-CM

## 2018-09-17 DIAGNOSIS — E03.9 ACQUIRED HYPOTHYROIDISM: ICD-10-CM

## 2018-09-17 DIAGNOSIS — R42 DIZZINESS: ICD-10-CM

## 2018-09-17 DIAGNOSIS — E78.2 MIXED HYPERLIPIDEMIA: ICD-10-CM

## 2018-09-17 DIAGNOSIS — R53.83 FATIGUE, UNSPECIFIED TYPE: ICD-10-CM

## 2018-09-17 DIAGNOSIS — F10.10 ALCOHOL ABUSE: Primary | ICD-10-CM

## 2018-09-17 DIAGNOSIS — Z23 NEED FOR INFLUENZA VACCINATION: ICD-10-CM

## 2018-09-17 DIAGNOSIS — F41.1 GENERALIZED ANXIETY DISORDER: ICD-10-CM

## 2018-09-17 DIAGNOSIS — N28.9 RENAL INSUFFICIENCY: ICD-10-CM

## 2018-09-17 DIAGNOSIS — I10 ESSENTIAL HYPERTENSION: ICD-10-CM

## 2018-09-17 LAB
BASOPHILS ABSOLUTE: 0.1 K/UL (ref 0–0.2)
BASOPHILS RELATIVE PERCENT: 0.8 %
EOSINOPHILS ABSOLUTE: 0.2 K/UL (ref 0–0.6)
EOSINOPHILS RELATIVE PERCENT: 1.8 %
HCT VFR BLD CALC: 47.5 % (ref 40.5–52.5)
HEMOGLOBIN: 16 G/DL (ref 13.5–17.5)
LYMPHOCYTES ABSOLUTE: 1.2 K/UL (ref 1–5.1)
LYMPHOCYTES RELATIVE PERCENT: 12.7 %
MCH RBC QN AUTO: 31.6 PG (ref 26–34)
MCHC RBC AUTO-ENTMCNC: 33.7 G/DL (ref 31–36)
MCV RBC AUTO: 93.7 FL (ref 80–100)
MONOCYTES ABSOLUTE: 0.8 K/UL (ref 0–1.3)
MONOCYTES RELATIVE PERCENT: 8.6 %
NEUTROPHILS ABSOLUTE: 7.1 K/UL (ref 1.7–7.7)
NEUTROPHILS RELATIVE PERCENT: 76.1 %
PDW BLD-RTO: 13 % (ref 12.4–15.4)
PLATELET # BLD: 241 K/UL (ref 135–450)
PMV BLD AUTO: 9.3 FL (ref 5–10.5)
PROSTATE SPECIFIC ANTIGEN: 0.88 NG/ML (ref 0–4)
RBC # BLD: 5.07 M/UL (ref 4.2–5.9)
TSH SERPL DL<=0.05 MIU/L-ACNC: 0.09 UIU/ML (ref 0.27–4.2)
VITAMIN B-12: 373 PG/ML (ref 211–911)
WBC # BLD: 9.4 K/UL (ref 4–11)

## 2018-09-17 PROCEDURE — G0008 ADMIN INFLUENZA VIRUS VAC: HCPCS | Performed by: INTERNAL MEDICINE

## 2018-09-17 PROCEDURE — 99214 OFFICE O/P EST MOD 30 MIN: CPT | Performed by: INTERNAL MEDICINE

## 2018-09-17 PROCEDURE — 90662 IIV NO PRSV INCREASED AG IM: CPT | Performed by: INTERNAL MEDICINE

## 2018-09-17 ASSESSMENT — ENCOUNTER SYMPTOMS
VOMITING: 0
WHEEZING: 0
CHEST TIGHTNESS: 0
CONSTIPATION: 0
ABDOMINAL PAIN: 0
TROUBLE SWALLOWING: 0
SORE THROAT: 0
RHINORRHEA: 0
NAUSEA: 0
DIARRHEA: 0
COUGH: 0
SHORTNESS OF BREATH: 1

## 2018-09-17 NOTE — PATIENT INSTRUCTIONS
vaccines that do not contain thimerosal are available. There is no live flu virus in flu shots. They cannot cause the flu. There are many flu viruses, and they are always changing. Each year a new flu vaccine is made to protect against three or four viruses that are likely to cause disease in the upcoming flu season. But even when the vaccine doesn't exactly match these viruses, it may still provide some protection. Flu vaccine cannot prevent:  · Flu that is caused by a virus not covered by the vaccine. · Illnesses that look like flu but are not. Some people should not get this vaccine  Tell the person who is giving you the vaccine:  · If you have any severe (life-threatening) allergies. If you ever had a life-threatening allergic reaction after a dose of flu vaccine, or have a severe allergy to any part of this vaccine, you may be advised not to get vaccinated. Most, but not all, types of flu vaccine contain a small amount of egg protein. · If you ever had Guillain-Barré syndrome (also called GBS) Some people with a history of GBS should not get this vaccine. This should be discussed with your doctor. · If you are not feeling well. It is usually okay to get flu vaccine when you have a mild illness, but you might be asked to come back when you feel better. Risks of a vaccine reaction  With any medicine, including vaccines, there is a chance of reactions. These are usually mild and go away on their own, but serious reactions are also possible. Most people who get a flu shot do not have any problems with it. Minor problems following a flu shot include:  · Soreness, redness, or swelling where the shot was given  · Hoarseness  · Sore, red or itchy eyes  · Cough  · Fever  · Aches  · Headache  · Itching  · Fatigue  If these problems occur, they usually begin soon after the shot and last 1 or 2 days.   More serious problems following a flu shot can include the following:  · There may be a small increased risk of Guillain-Barré Syndrome (GBS) after inactivated flu vaccine. This risk has been estimated at 1 or 2 additional cases per million people vaccinated. This is much lower than the risk of severe complications from flu, which can be prevented by flu vaccine. · [facility] children who get the flu shot along with pneumococcal vaccine (PCV13) and/or DTaP vaccine at the same time might be slightly more likely to have a seizure caused by fever. Ask your doctor for more information. Tell your doctor if a child who is getting flu vaccine has ever had a seizure  Problems that could happen after any injected vaccine:  · People sometimes faint after a medical procedure, including vaccination. Sitting or lying down for about 15 minutes can help prevent fainting, and injuries caused by a fall. Tell your doctor if you feel dizzy, or have vision changes or ringing in the ears. · Some people get severe pain in the shoulder and have difficulty moving the arm where a shot was given. This happens very rarely. · Any medication can cause a severe allergic reaction. Such reactions from a vaccine are very rare, estimated at about 1 in a million doses, and would happen within a few minutes to a few hours after the vaccination. As with any medicine, there is a very remote chance of a vaccine causing a serious injury or death. The safety of vaccines is always being monitored. For more information, visit: www.cdc.gov/vaccinesafety/. What if there is a serious reaction? What should I look for? · Look for anything that concerns you, such as signs of a severe allergic reaction, very high fever, or unusual behavior. Signs of a severe allergic reaction can include hives, swelling of the face and throat, difficulty breathing, a fast heartbeat, dizziness, and weakness - usually within a few minutes to a few hours after the vaccination. What should I do?   · If you think it is a severe allergic reaction or other emergency that can't wait, call 9-1-1

## 2018-09-17 NOTE — PROGRESS NOTES
diarrhea, nausea and vomiting. Endocrine: Negative for cold intolerance and heat intolerance. Genitourinary: Negative for dysuria, frequency and hematuria. Musculoskeletal: Negative for arthralgias and myalgias. Neurological: Negative for dizziness, syncope, light-headedness, numbness and headaches. Hematological: Bruises/bleeds easily. Psychiatric/Behavioral: Negative for decreased concentration, dysphoric mood and sleep disturbance. The patient is nervous/anxious. Allergies   Allergen Reactions    Latex Rash    Contrast [Gadolinium Derivatives] Shortness Of Breath and Anxiety    Soap Rash     Outpatient Prescriptions Marked as Taking for the 9/17/18 encounter (Office Visit) with Silvia Moreira MD   Medication Sig Dispense Refill    ALPRAZolam (XANAX) 1 MG tablet Take 1/2 tablet once daily and 1 tablet nightly. 45 tablet 0    carvedilol (COREG) 3.125 MG tablet Take 3.125 mg by mouth daily      KLOR-CON 10 10 MEQ extended release tablet Take 10 mEq by mouth daily  11    torsemide (DEMADEX) 20 MG tablet Take 80 mg by mouth 2 times daily      SYMBICORT 160-4.5 MCG/ACT AERO INHALE 2 PUFFS INTO THE LUNGS 2 TIMES DAILY 10.2 Inhaler 3    lisinopril (PRINIVIL;ZESTRIL) 2.5 MG tablet Take 1 tablet by mouth daily 30 tablet 3    potassium chloride (KLOR-CON M) 10 MEQ extended release tablet Take 1 tablet by mouth daily 30 tablet 3    levothyroxine (SYNTHROID) 125 MCG tablet TAKE 1 TABLET BY MOUTH DAILY 30 tablet 5    atorvastatin (LIPITOR) 40 MG tablet TAKE 1 TABLET BY MOUTH NIGHTLY 30 tablet 5    albuterol (PROVENTIL) (2.5 MG/3ML) 0.083% nebulizer solution Take 2.5 mg by nebulization every 6 hours as needed for Wheezing or Shortness of Breath      triamcinolone (KENALOG) 0.1 % cream Apply to affecteds area twice daily for up to 2 weeks or until improved. For eczema.  80 g 2    nicotine (NICODERM CQ) 21 MG/24HR Place 1 patch onto the skin every 24 hours      Multiple Vitamins-Minerals  CREATININE 08/27/2018 1.32*    Calcium 08/27/2018 9.7     Age Time 08/27/2018 67     GFR Non- 08/27/2018 54     GFR  08/27/2018 65     BNP 08/27/2018 462     BASIC METABOLIC PANEL 20/10/6132 NA     Sodium 09/03/2018 138     Potassium 09/03/2018 4.0     Chloride 09/03/2018 100     CO2 09/03/2018 28.7     Anion Gap 09/03/2018 9     POC Glucose 09/03/2018 112*    BUN 09/03/2018 34*    CREATININE 09/03/2018 1.52*    Calcium 09/03/2018 9.4     Age Time 09/03/2018 67     GFR Non- 09/03/2018 46     GFR  09/03/2018 56     BNP 09/03/2018 496     BNP 09/10/2018 310     BASIC METABOLIC PANEL 73/32/2858 NA     Sodium 09/10/2018 140     Potassium 09/10/2018 3.8     Chloride 09/10/2018 98     CO2 09/10/2018 31.8     Anion Gap 09/10/2018 10     POC Glucose 09/10/2018 99     BUN 09/10/2018 33*    CREATININE 09/10/2018 1.74*    Calcium 09/10/2018 9.3     Age Time 09/10/2018 67     GFR Non- 09/10/2018 39     GFR  09/10/2018 48    Orders Only on 08/17/2018   Component Date Value    TSH 08/17/2018 0.12*   Office Visit on 08/17/2018   Component Date Value    VARICELLA ZOSTER AB IGG 08/17/2018 561      Lab Results   Component Value Date    CHOL 235 (H) 06/29/2018    TRIG 247 (H) 06/29/2018    HDL 62 06/29/2018    LDLCALC 81 06/03/2015    LDLDIRECT 124 06/29/2018       Assessment/Plan     1. Alcohol abuse  - Patient is working on alcohol cessation  -Continue AA meetings  - Hepatic Function Panel    2. Acquired hypothyroidism  -Continue same dose of levothyroxine  - TSH without Reflex; Future today and will adjust dose if needed    3. Dizziness  -Patient plans to have a basic metabolic panel done tomorrow ordered by cardiology  - CBC Auto Differential; Future  -Patient's Torsemide has are ready been decreased by   Cardiology within the past one week  -stay hydrated     4.  Fatigue, unspecified type  - TSH

## 2018-09-17 NOTE — PROGRESS NOTES
Vaccine Information Sheet, \"Influenza - Inactivated\"  given to Makayla Mann, or parent/legal guardian of  aMkayla Mann and verbalized understanding. Patient responses:    Have you ever had a reaction to a flu vaccine? No  Are you able to eat eggs without adverse effects? Yes  Do you have any current illness? No  Have you ever had Guillian Colliers Syndrome? No    Flu vaccine given per order. Please see immunization tab.

## 2018-09-17 NOTE — TELEPHONE ENCOUNTER
Last seen  8/17/18  Last filled   3/26/18  Last labs    8/17/18  TSH  0.12  Appt   11/1/18       Requested Prescriptions     Pending Prescriptions Disp Refills    levothyroxine (SYNTHROID) 125 MCG tablet [Pharmacy Med Name: LEVOTHYROXINE 125 MCG TABLET] 30 tablet 5     Sig: TAKE 1 TABLET BY MOUTH DAILY

## 2018-09-18 DIAGNOSIS — E03.9 ACQUIRED HYPOTHYROIDISM: Primary | ICD-10-CM

## 2018-09-18 RX ORDER — LEVOTHYROXINE SODIUM 0.12 MG/1
TABLET ORAL
Qty: 30 TABLET | Refills: 5 | OUTPATIENT
Start: 2018-09-18

## 2018-09-18 RX ORDER — LEVOTHYROXINE SODIUM 0.1 MG/1
100 TABLET ORAL DAILY
Qty: 30 TABLET | Refills: 2 | Status: SHIPPED | OUTPATIENT
Start: 2018-09-18 | End: 2018-12-11 | Stop reason: SDUPTHER

## 2018-10-02 ENCOUNTER — CARE COORDINATION (OUTPATIENT)
Dept: CARE COORDINATION | Age: 68
End: 2018-10-02

## 2018-10-03 DIAGNOSIS — F41.1 GENERALIZED ANXIETY DISORDER: ICD-10-CM

## 2018-10-03 RX ORDER — ALPRAZOLAM 1 MG/1
TABLET ORAL
Qty: 45 TABLET | Refills: 0 | Status: SHIPPED | OUTPATIENT
Start: 2018-10-03 | End: 2018-11-01 | Stop reason: SDUPTHER

## 2018-10-10 LAB
ALBUMIN SERPL-MCNC: NORMAL G/DL
ALP BLD-CCNC: NORMAL U/L
ALT SERPL-CCNC: NORMAL U/L
ANION GAP SERPL CALCULATED.3IONS-SCNC: 8 MMOL/L
AST SERPL-CCNC: NORMAL U/L
B-TYPE NATRIURETIC PEPTIDE: 317 PG/ML
BILIRUB SERPL-MCNC: NORMAL MG/DL (ref 0.1–1.4)
BUN BLDV-MCNC: 24 MG/DL
CALCIUM SERPL-MCNC: 9.7 MG/DL
CHLORIDE BLD-SCNC: 99 MMOL/L
CO2: 31 MMOL/L
CREAT SERPL-MCNC: 1.38 MG/DL
GFR CALCULATED: NORMAL
GLUCOSE BLD-MCNC: 110 MG/DL
POTASSIUM SERPL-SCNC: 4.2 MMOL/L
SODIUM BLD-SCNC: 138 MMOL/L
TOTAL PROTEIN: NORMAL

## 2018-10-12 ENCOUNTER — CARE COORDINATION (OUTPATIENT)
Dept: CARE COORDINATION | Age: 68
End: 2018-10-12

## 2018-10-23 LAB
AGE TIME: 68 YRS
ALBUMIN SERPL-MCNC: NORMAL G/DL
ALP BLD-CCNC: NORMAL U/L
ALT SERPL-CCNC: NORMAL U/L
ANION GAP SERPL CALCULATED.3IONS-SCNC: 7 MMOL/L
ANION GAP SERPL CALCULATED.3IONS-SCNC: 7 MMOL/L
AST SERPL-CCNC: NORMAL U/L
B-TYPE NATRIURETIC PEPTIDE: 240 PG/ML
BASIC METABOLIC PANEL: ABNORMAL
BILIRUB SERPL-MCNC: NORMAL MG/DL (ref 0.1–1.4)
BUN BLDV-MCNC: 29 MG/DL
BUN BLDV-MCNC: 29 MG/DL
CALCIUM SERPL-MCNC: 9.4 MG/DL
CALCIUM SERPL-MCNC: 9.4 MG/DL
CHLORIDE BLD-SCNC: 98 MMOL/L
CHLORIDE BLD-SCNC: 98 MMOL/L
CO2: 32.1 MMOL/L
CO2: 32.1 MMOL/L
CREAT SERPL-MCNC: 1.72 MG/DL
CREAT SERPL-MCNC: 1.72 MG/DL
GFR AFRICAN AMERICAN: 48 ML/MIN
GFR CALCULATED: 40
GFR NON-AFRICAN AMERICAN: 40 ML/MIN
GLUCOSE BLD-MCNC: 113 MG/DL
GLUCOSE BLD-MCNC: 113 MG/DL
POTASSIUM SERPL-SCNC: 4.1 MMOL/L
POTASSIUM SERPL-SCNC: 4.1 MMOL/L
SODIUM BLD-SCNC: 137 MMOL/L
SODIUM BLD-SCNC: 137 MMOL/L
TOTAL PROTEIN: NORMAL

## 2018-10-31 LAB
AGE TIME: 68 YRS
ANION GAP SERPL CALCULATED.3IONS-SCNC: 8 MMOL/L
B-TYPE NATRIURETIC PEPTIDE: 189 PG/ML
BASIC METABOLIC PANEL: ABNORMAL
BUN BLDV-MCNC: 29 MG/DL
BUN BLDV-MCNC: 29 MG/DL
CALCIUM SERPL-MCNC: 9.4 MG/DL
CALCIUM SERPL-MCNC: 9.4 MG/DL
CHLORIDE BLD-SCNC: 102 MMOL/L
CHLORIDE BLD-SCNC: 102 MMOL/L
CO2: 31.7 MMOL/L
CO2: 31.7 MMOL/L
CREAT SERPL-MCNC: 1.5 MG/DL
CREAT SERPL-MCNC: 1.5 MG/DL
GFR AFRICAN AMERICAN: 56 ML/MIN
GFR CALCULATED: 47
GFR NON-AFRICAN AMERICAN: 47 ML/MIN
GLUCOSE BLD-MCNC: 97 MG/DL
GLUCOSE BLD-MCNC: 97 MG/DL
POTASSIUM SERPL-SCNC: 4.1 MMOL/L
POTASSIUM SERPL-SCNC: 4.1 MMOL/L
SODIUM BLD-SCNC: 142 MMOL/L
SODIUM BLD-SCNC: 142 MMOL/L

## 2018-11-01 ENCOUNTER — OFFICE VISIT (OUTPATIENT)
Dept: INTERNAL MEDICINE CLINIC | Age: 68
End: 2018-11-01
Payer: MEDICARE

## 2018-11-01 ENCOUNTER — CARE COORDINATION (OUTPATIENT)
Dept: CARE COORDINATION | Age: 68
End: 2018-11-01

## 2018-11-01 VITALS
HEART RATE: 95 BPM | OXYGEN SATURATION: 94 % | HEIGHT: 67 IN | BODY MASS INDEX: 30.2 KG/M2 | DIASTOLIC BLOOD PRESSURE: 76 MMHG | SYSTOLIC BLOOD PRESSURE: 126 MMHG | WEIGHT: 192.4 LBS

## 2018-11-01 DIAGNOSIS — R42 LIGHTHEADEDNESS: ICD-10-CM

## 2018-11-01 DIAGNOSIS — R53.83 FATIGUE, UNSPECIFIED TYPE: ICD-10-CM

## 2018-11-01 DIAGNOSIS — E03.9 ACQUIRED HYPOTHYROIDISM: ICD-10-CM

## 2018-11-01 DIAGNOSIS — F41.1 GENERALIZED ANXIETY DISORDER: ICD-10-CM

## 2018-11-01 DIAGNOSIS — F10.10 ALCOHOL ABUSE: Primary | ICD-10-CM

## 2018-11-01 DIAGNOSIS — F10.10 ALCOHOL ABUSE: ICD-10-CM

## 2018-11-01 LAB
A/G RATIO: 1.7 (ref 1.1–2.2)
ALBUMIN SERPL-MCNC: 4.7 G/DL (ref 3.4–5)
ALP BLD-CCNC: 132 U/L (ref 40–129)
ALT SERPL-CCNC: 11 U/L (ref 10–40)
ANION GAP SERPL CALCULATED.3IONS-SCNC: 14 MMOL/L (ref 3–16)
AST SERPL-CCNC: 18 U/L (ref 15–37)
BASOPHILS ABSOLUTE: 0.1 K/UL (ref 0–0.2)
BASOPHILS RELATIVE PERCENT: 1.1 %
BILIRUB SERPL-MCNC: 0.5 MG/DL (ref 0–1)
BUN BLDV-MCNC: 29 MG/DL (ref 7–20)
CALCIUM SERPL-MCNC: 10 MG/DL (ref 8.3–10.6)
CHLORIDE BLD-SCNC: 97 MMOL/L (ref 99–110)
CO2: 29 MMOL/L (ref 21–32)
CREAT SERPL-MCNC: 1.4 MG/DL (ref 0.8–1.3)
EOSINOPHILS ABSOLUTE: 0.2 K/UL (ref 0–0.6)
EOSINOPHILS RELATIVE PERCENT: 1.9 %
GFR AFRICAN AMERICAN: >60
GFR NON-AFRICAN AMERICAN: 50
GLOBULIN: 2.7 G/DL
GLUCOSE BLD-MCNC: 100 MG/DL (ref 70–99)
HCT VFR BLD CALC: 48.3 % (ref 40.5–52.5)
HEMOGLOBIN: 16.8 G/DL (ref 13.5–17.5)
LYMPHOCYTES ABSOLUTE: 1.2 K/UL (ref 1–5.1)
LYMPHOCYTES RELATIVE PERCENT: 13.4 %
MCH RBC QN AUTO: 31.7 PG (ref 26–34)
MCHC RBC AUTO-ENTMCNC: 34.7 G/DL (ref 31–36)
MCV RBC AUTO: 91.3 FL (ref 80–100)
MONOCYTES ABSOLUTE: 0.7 K/UL (ref 0–1.3)
MONOCYTES RELATIVE PERCENT: 7.9 %
NEUTROPHILS ABSOLUTE: 6.9 K/UL (ref 1.7–7.7)
NEUTROPHILS RELATIVE PERCENT: 75.7 %
PDW BLD-RTO: 12.9 % (ref 12.4–15.4)
PLATELET # BLD: 241 K/UL (ref 135–450)
PMV BLD AUTO: 9.9 FL (ref 5–10.5)
POTASSIUM SERPL-SCNC: 4.2 MMOL/L (ref 3.5–5.1)
RBC # BLD: 5.29 M/UL (ref 4.2–5.9)
SODIUM BLD-SCNC: 140 MMOL/L (ref 136–145)
TOTAL PROTEIN: 7.4 G/DL (ref 6.4–8.2)
TSH SERPL DL<=0.05 MIU/L-ACNC: 0.8 UIU/ML (ref 0.27–4.2)
WBC # BLD: 9.1 K/UL (ref 4–11)

## 2018-11-01 PROCEDURE — 99214 OFFICE O/P EST MOD 30 MIN: CPT | Performed by: INTERNAL MEDICINE

## 2018-11-01 RX ORDER — ALPRAZOLAM 1 MG/1
TABLET ORAL
Qty: 45 TABLET | Refills: 2 | Status: SHIPPED | OUTPATIENT
Start: 2018-11-01 | End: 2019-02-18 | Stop reason: SDUPTHER

## 2018-11-01 NOTE — PROGRESS NOTES
Outpatient Prescriptions Marked as Taking for the 11/1/18 encounter (Office Visit) with Minerva Webster MD   Medication Sig Dispense Refill    ALPRAZolam (XANAX) 1 MG tablet TAKE 1/2 TABLET ONCE DAILY AND 1 TABLET NIGHTLY. . 45 tablet 2    levothyroxine (SYNTHROID) 100 MCG tablet Take 1 tablet by mouth Daily 30 tablet 2    carvedilol (COREG) 3.125 MG tablet Take 3.125 mg by mouth daily      KLOR-CON 10 10 MEQ extended release tablet Take 10 mEq by mouth daily  11    torsemide (DEMADEX) 20 MG tablet Take 80 mg by mouth daily       SYMBICORT 160-4.5 MCG/ACT AERO INHALE 2 PUFFS INTO THE LUNGS 2 TIMES DAILY 10.2 Inhaler 3    lisinopril (PRINIVIL;ZESTRIL) 2.5 MG tablet Take 1 tablet by mouth daily 30 tablet 3    potassium chloride (KLOR-CON M) 10 MEQ extended release tablet Take 1 tablet by mouth daily 30 tablet 3    atorvastatin (LIPITOR) 40 MG tablet TAKE 1 TABLET BY MOUTH NIGHTLY 30 tablet 5    albuterol (PROVENTIL) (2.5 MG/3ML) 0.083% nebulizer solution Take 2.5 mg by nebulization every 6 hours as needed for Wheezing or Shortness of Breath      triamcinolone (KENALOG) 0.1 % cream Apply to affecteds area twice daily for up to 2 weeks or until improved. For eczema. 80 g 2    nicotine (NICODERM CQ) 21 MG/24HR Place 1 patch onto the skin every 24 hours      Multiple Vitamins-Minerals (CENTRUM SILVER ADULT 50+ PO) Take 1 tablet by mouth daily      aspirin 81 MG tablet Take 1 tablet by mouth daily. 30 tablet 5    albuterol (PROAIR HFA) 108 (90 BASE) MCG/ACT inhaler Inhale 2 puffs into the lungs every 6 hours as needed. Vitals:    11/01/18 0936 11/01/18 0939   BP: 126/76    Site: Right Upper Arm    Position: Sitting    Cuff Size: Medium Adult    Pulse: 103 95   SpO2: 94%    Weight: 192 lb 6.4 oz (87.3 kg)    Height: 5' 7\" (1.702 m)      Body mass index is 30.13 kg/m². Physical Exam   Constitutional: He appears well-developed and well-nourished. No distress.    HENT:   Mouth/Throat:

## 2018-11-01 NOTE — CARE COORDINATION
 Conditions and Symptoms   On track (11/1/2018)             I will keep my appointment or reschedule if I have to cancel. I will follow my Zone Management tool to seek urgent or emergent care. I will notify my provider of any symptoms that indicate a worsening of my condition. Barriers: impairment:  substance abuse: alcohol, overwhelmed by complexity of regimen and stress  Plan for overcoming my barriers: engagement with [address] and Care Coordination team  Confidence: 7/10  Anticipated Goal Completion Date: 9/27/18              Prior to Admission medications    Medication Sig Start Date End Date Taking? Authorizing Provider   ALPRAZolam (XANAX) 1 MG tablet TAKE 1/2 TABLET ONCE DAILY AND 1 TABLET NIGHTLY. . 11/1/18 1/30/19  Mirtha Tucker MD   levothyroxine (SYNTHROID) 100 MCG tablet Take 1 tablet by mouth Daily 9/18/18   Mirtha Tucker MD   carvedilol (COREG) 3.125 MG tablet Take 3.125 mg by mouth daily 4/27/18   Historical Provider, MD   KLOR-CON 10 10 MEQ extended release tablet Take 10 mEq by mouth daily 4/27/18   Historical Provider, MD   torsemide (DEMADEX) 20 MG tablet Take 80 mg by mouth daily  6/28/18   Historical Provider, MD   SYMBICORT 160-4.5 MCG/ACT AERO INHALE 2 PUFFS INTO THE LUNGS 2 TIMES DAILY 5/23/18   Mirtha Tucker MD   lisinopril (PRINIVIL;ZESTRIL) 2.5 MG tablet Take 1 tablet by mouth daily 4/4/18   Jb FluANTONIO rangel - CNP   potassium chloride (KLOR-CON M) 10 MEQ extended release tablet Take 1 tablet by mouth daily 4/4/18   Jb Flurry, ANTONIO - CNP   atorvastatin (LIPITOR) 40 MG tablet TAKE 1 TABLET BY MOUTH NIGHTLY 2/13/18   Mirtha Tucker MD   albuterol (PROVENTIL) (2.5 MG/3ML) 0.083% nebulizer solution Take 2.5 mg by nebulization every 6 hours as needed for Wheezing or Shortness of Breath    Historical Provider, MD   triamcinolone (KENALOG) 0.1 % cream Apply to affecteds area twice daily for up to 2 weeks or until improved. For eczema.  12/13/16   Cady Saenz

## 2018-11-06 ASSESSMENT — ENCOUNTER SYMPTOMS
VOMITING: 0
SORE THROAT: 0
TROUBLE SWALLOWING: 0
RHINORRHEA: 0
CHEST TIGHTNESS: 0
COUGH: 0
SHORTNESS OF BREATH: 0
ABDOMINAL PAIN: 0
DIARRHEA: 0
WHEEZING: 0
CONSTIPATION: 0
NAUSEA: 0

## 2018-12-03 NOTE — TELEPHONE ENCOUNTER
Medication:   Requested Prescriptions     Pending Prescriptions Disp Refills    atorvastatin (LIPITOR) 40 MG tablet [Pharmacy Med Name: ATORVASTATIN 40 MG TABLET] 30 tablet 5     Sig: TAKE 1 TABLET BY MOUTH NIGHTLY     Last Filled:  11/5/2018    Last appt: 9/17/2018   Next appt: 2/4/2019    Last Lipid:   Lab Results   Component Value Date    CHOL 235 06/29/2018    TRIG 247 06/29/2018    HDL 62 06/29/2018    [address] 81 06/03/2015

## 2018-12-06 RX ORDER — ATORVASTATIN CALCIUM 40 MG/1
TABLET, FILM COATED ORAL
Qty: 30 TABLET | Refills: 3 | Status: SHIPPED | OUTPATIENT
Start: 2018-12-06 | End: 2019-09-28 | Stop reason: SDUPTHER

## 2018-12-07 LAB
AGE TIME: 68 YRS
ANION GAP SERPL CALCULATED.3IONS-SCNC: 8 MMOL/L
BASIC METABOLIC PANEL: ABNORMAL
BUN BLDV-MCNC: 32 MG/DL
CALCIUM SERPL-MCNC: 9.6 MG/DL
CHLORIDE BLD-SCNC: 99 MMOL/L
CO2: 32.5 MMOL/L
CREAT SERPL-MCNC: 1.59 MG/DL
GFR AFRICAN AMERICAN: 53 ML/MIN
GFR NON-AFRICAN AMERICAN: 44 ML/MIN
GLUCOSE BLD-MCNC: 98 MG/DL
POTASSIUM SERPL-SCNC: 4.1 MMOL/L
SODIUM BLD-SCNC: 139 MMOL/L

## 2018-12-11 DIAGNOSIS — E03.9 ACQUIRED HYPOTHYROIDISM: ICD-10-CM

## 2018-12-11 DIAGNOSIS — J44.9 CHRONIC OBSTRUCTIVE PULMONARY DISEASE, UNSPECIFIED COPD TYPE (HCC): ICD-10-CM

## 2018-12-11 RX ORDER — BUDESONIDE AND FORMOTEROL FUMARATE DIHYDRATE 160; 4.5 UG/1; UG/1
2 AEROSOL RESPIRATORY (INHALATION) 2 TIMES DAILY
Qty: 10.2 INHALER | Refills: 3 | Status: SHIPPED | OUTPATIENT
Start: 2018-12-11 | End: 2019-08-20 | Stop reason: SDUPTHER

## 2018-12-11 RX ORDER — LEVOTHYROXINE SODIUM 0.1 MG/1
TABLET ORAL
Qty: 30 TABLET | Refills: 2 | Status: SHIPPED | OUTPATIENT
Start: 2018-12-11 | End: 2019-03-13 | Stop reason: SDUPTHER

## 2018-12-21 ENCOUNTER — CARE COORDINATION (OUTPATIENT)
Dept: CARE COORDINATION | Age: 68
End: 2018-12-21

## 2019-01-16 ENCOUNTER — CARE COORDINATION (OUTPATIENT)
Dept: CARE COORDINATION | Age: 69
End: 2019-01-16

## 2019-01-28 ENCOUNTER — CARE COORDINATION (OUTPATIENT)
Dept: CARE COORDINATION | Age: 69
End: 2019-01-28

## 2019-02-04 ENCOUNTER — OFFICE VISIT (OUTPATIENT)
Dept: INTERNAL MEDICINE CLINIC | Age: 69
End: 2019-02-04
Payer: MEDICARE

## 2019-02-04 ENCOUNTER — CARE COORDINATION (OUTPATIENT)
Dept: CARE COORDINATION | Age: 69
End: 2019-02-04

## 2019-02-04 VITALS
BODY MASS INDEX: 29.32 KG/M2 | HEART RATE: 96 BPM | WEIGHT: 186.8 LBS | SYSTOLIC BLOOD PRESSURE: 100 MMHG | OXYGEN SATURATION: 96 % | HEIGHT: 67 IN | DIASTOLIC BLOOD PRESSURE: 64 MMHG

## 2019-02-04 DIAGNOSIS — F41.9 ANXIETY: ICD-10-CM

## 2019-02-04 DIAGNOSIS — I10 ESSENTIAL HYPERTENSION: Primary | ICD-10-CM

## 2019-02-04 DIAGNOSIS — J44.9 CHRONIC OBSTRUCTIVE PULMONARY DISEASE, UNSPECIFIED COPD TYPE (HCC): ICD-10-CM

## 2019-02-04 DIAGNOSIS — E03.9 ACQUIRED HYPOTHYROIDISM: ICD-10-CM

## 2019-02-04 DIAGNOSIS — E78.2 MIXED HYPERLIPIDEMIA: ICD-10-CM

## 2019-02-04 PROCEDURE — 99214 OFFICE O/P EST MOD 30 MIN: CPT | Performed by: INTERNAL MEDICINE

## 2019-02-11 ASSESSMENT — ENCOUNTER SYMPTOMS
DIARRHEA: 0
SORE THROAT: 0
COUGH: 0
ABDOMINAL PAIN: 0
CONSTIPATION: 0
RHINORRHEA: 0
WHEEZING: 0
NAUSEA: 0
CHEST TIGHTNESS: 0
SHORTNESS OF BREATH: 0
VOMITING: 0

## 2019-02-15 LAB
AGE TIME: 68 YRS
ALBUMIN SERPL-MCNC: 3.6 G/DL
ALP BLD-CCNC: 151 U/L
ALT SERPL-CCNC: 20 U/L
ANION GAP SERPL CALCULATED.3IONS-SCNC: 5 MMOL/L
AST SERPL-CCNC: 12 U/L
BILIRUB SERPL-MCNC: 0.45 MG/DL
BUN BLDV-MCNC: 44 MG/DL
CALCIUM SERPL-MCNC: 9.4 MG/DL
CHLORIDE BLD-SCNC: 100 MMOL/L
CHOLESTEROL, TOTAL: 193 MG/DL
CO2: 32.7 MMOL/L
COMPREHENSIVE METABOLIC PANEL: ABNORMAL
CREAT SERPL-MCNC: 1.66 MG/DL
GFR AFRICAN AMERICAN: 50 ML/MIN
GFR NON-AFRICAN AMERICAN: 41 ML/MIN
GLUCOSE BLD-MCNC: 117 MG/DL
HDLC SERPL-MCNC: 52 MG/DL
LDL CHOLESTEROL DIRECT: 119 MG/DL
LIPID PANEL: NORMAL
POTASSIUM SERPL-SCNC: 4 MMOL/L
SODIUM BLD-SCNC: 138 MMOL/L
THYROTROPIN RELEASING HORMONE: 1.57 UIU/ML
TOTAL PROTEIN: 7.2 G/DL
TRIGL SERPL-MCNC: 109 MG/DL
VLDLC SERPL CALC-MCNC: 22 MG/DL

## 2019-02-18 DIAGNOSIS — F41.1 GENERALIZED ANXIETY DISORDER: ICD-10-CM

## 2019-02-19 RX ORDER — ALPRAZOLAM 1 MG/1
TABLET ORAL
Qty: 45 TABLET | Refills: 2 | Status: SHIPPED | OUTPATIENT
Start: 2019-02-19 | End: 2019-05-29 | Stop reason: SDUPTHER

## 2019-02-25 ENCOUNTER — OFFICE VISIT (OUTPATIENT)
Dept: DERMATOLOGY | Age: 69
End: 2019-02-25
Payer: MEDICARE

## 2019-02-25 DIAGNOSIS — L30.9 CHRONIC DERMATITIS: Primary | ICD-10-CM

## 2019-02-25 DIAGNOSIS — L82.1 SK (SEBORRHEIC KERATOSIS): ICD-10-CM

## 2019-02-25 PROCEDURE — 99213 OFFICE O/P EST LOW 20 MIN: CPT | Performed by: DERMATOLOGY

## 2019-02-25 RX ORDER — CLOBETASOL PROPIONATE 0.5 MG/G
CREAM TOPICAL
Qty: 60 G | Refills: 2 | Status: SHIPPED | OUTPATIENT
Start: 2019-02-25 | End: 2020-02-25 | Stop reason: SDUPTHER

## 2019-03-07 LAB
ALBUMIN SERPL-MCNC: NORMAL G/DL
ALP BLD-CCNC: NORMAL U/L
ALT SERPL-CCNC: NORMAL U/L
ANION GAP SERPL CALCULATED.3IONS-SCNC: 6 MMOL/L
AST SERPL-CCNC: NORMAL U/L
B-TYPE NATRIURETIC PEPTIDE: 235 PG/ML
BILIRUB SERPL-MCNC: NORMAL MG/DL (ref 0.1–1.4)
BUN BLDV-MCNC: 30 MG/DL
CALCIUM SERPL-MCNC: 9.6 MG/DL
CHLORIDE BLD-SCNC: 104 MMOL/L
CO2: 31.1 MMOL/L
CREAT SERPL-MCNC: 1.45 MG/DL
GFR CALCULATED: 48
GLUCOSE BLD-MCNC: 112 MG/DL
POTASSIUM SERPL-SCNC: 4.3 MMOL/L
SODIUM BLD-SCNC: 141 MMOL/L
TOTAL PROTEIN: NORMAL

## 2019-03-13 DIAGNOSIS — E03.9 ACQUIRED HYPOTHYROIDISM: ICD-10-CM

## 2019-03-13 RX ORDER — LEVOTHYROXINE SODIUM 0.1 MG/1
TABLET ORAL
Qty: 30 TABLET | Refills: 2 | Status: SHIPPED | OUTPATIENT
Start: 2019-03-13 | End: 2019-08-19 | Stop reason: SDUPTHER

## 2019-03-15 NOTE — CARE COORDINATION
In Process  Social Work:  Completed  Zone Management Tools: In Process         Goals Addressed             Most Recent     Conditions and Symptoms   No change (7/16/2018)             I will keep my appointment or reschedule if I have to cancel. I will follow my Zone Management tool to seek urgent or emergent care. I will notify my provider of any symptoms that indicate a worsening of my condition. Barriers: impairment:  substance abuse: alcohol, overwhelmed by complexity of regimen and stress  Plan for overcoming my barriers: engagement with [facility] and Care Coordination team  Confidence: 7/10  Anticipated Goal Completion Date: 9/27/18              Prior to Admission medications    Medication Sig Start Date End Date Taking?  Authorizing Provider   loratadine (CLARITIN) 10 MG tablet Take 1 tablet by mouth daily 6/26/18   Ricardo Bhatt MD   SYMBICORT 160-4.5 MCG/ACT AERO INHALE 2 PUFFS INTO THE LUNGS 2 TIMES DAILY 5/23/18   Ricardo Bhatt MD   furosemide (LASIX) 80 MG tablet Take 1 tablet by mouth 2 times daily 4/6/18   ANTONIO Brown CNP   carvedilol (COREG) 3.125 MG tablet Take 1 tablet by mouth 2 times daily (with meals) 4/4/18   ANTONIO Brown CNP   lisinopril (PRINIVIL;ZESTRIL) 2.5 MG tablet Take 1 tablet by mouth daily 4/4/18   ANTONIO Brown CNP   potassium chloride (KLOR-CON M) 10 MEQ extended release tablet Take 1 tablet by mouth daily 4/4/18   ANTONIO Brown CNP   levothyroxine (SYNTHROID) 125 MCG tablet TAKE 1 TABLET BY MOUTH DAILY 3/26/18   Ricardo Bhatt MD   INCRUSE ELLIPTA 62.5 MCG/INH AEPB Take 62.5 mg by mouth daily 2/19/18   Historical Provider, MD   famotidine (PEPCID) 10 MG tablet Take 10 mg by mouth 2 times daily    Historical Provider, MD   fluticasone (FLONASE) 50 MCG/ACT nasal spray 1 spray by Nasal route daily    Historical Provider, MD   fluocinonide (LIDEX) 0.05 % cream Apply to affected areas twice daily for up to 2 weeks PAST SURGICAL HISTORY:  H/O shoulder surgery

## 2019-05-06 ENCOUNTER — OFFICE VISIT (OUTPATIENT)
Dept: INTERNAL MEDICINE CLINIC | Age: 69
End: 2019-05-06
Payer: MEDICARE

## 2019-05-06 VITALS
HEIGHT: 67 IN | DIASTOLIC BLOOD PRESSURE: 70 MMHG | WEIGHT: 192.4 LBS | OXYGEN SATURATION: 93 % | BODY MASS INDEX: 30.2 KG/M2 | SYSTOLIC BLOOD PRESSURE: 110 MMHG | HEART RATE: 98 BPM | TEMPERATURE: 97.6 F

## 2019-05-06 DIAGNOSIS — J44.9 CHRONIC OBSTRUCTIVE PULMONARY DISEASE, UNSPECIFIED COPD TYPE (HCC): ICD-10-CM

## 2019-05-06 DIAGNOSIS — I10 ESSENTIAL HYPERTENSION: Primary | ICD-10-CM

## 2019-05-06 DIAGNOSIS — F41.1 GENERALIZED ANXIETY DISORDER: ICD-10-CM

## 2019-05-06 DIAGNOSIS — J40 BRONCHITIS: ICD-10-CM

## 2019-05-06 DIAGNOSIS — R73.03 PREDIABETES: ICD-10-CM

## 2019-05-06 DIAGNOSIS — E78.2 MIXED HYPERLIPIDEMIA: ICD-10-CM

## 2019-05-06 DIAGNOSIS — E03.9 ACQUIRED HYPOTHYROIDISM: ICD-10-CM

## 2019-05-06 DIAGNOSIS — I50.22 CHRONIC SYSTOLIC HEART FAILURE (HCC): ICD-10-CM

## 2019-05-06 DIAGNOSIS — F10.10 ALCOHOL ABUSE: ICD-10-CM

## 2019-05-06 DIAGNOSIS — N18.30 STAGE 3 CHRONIC KIDNEY DISEASE (HCC): ICD-10-CM

## 2019-05-06 LAB — HBA1C MFR BLD: 6.1 %

## 2019-05-06 PROCEDURE — 99214 OFFICE O/P EST MOD 30 MIN: CPT | Performed by: INTERNAL MEDICINE

## 2019-05-06 PROCEDURE — 83036 HEMOGLOBIN GLYCOSYLATED A1C: CPT | Performed by: INTERNAL MEDICINE

## 2019-05-06 RX ORDER — PREDNISONE 10 MG/1
10 TABLET ORAL
COMMUNITY
Start: 2019-05-02 | End: 2019-05-06 | Stop reason: SDUPTHER

## 2019-05-06 RX ORDER — CARVEDILOL 6.25 MG/1
6.25 TABLET ORAL 2 TIMES DAILY WITH MEALS
COMMUNITY
Start: 2019-05-06

## 2019-05-06 RX ORDER — SPIRONOLACTONE 25 MG/1
12.5 TABLET ORAL DAILY
COMMUNITY
Start: 2019-05-06

## 2019-05-06 RX ORDER — DOXYCYCLINE 100 MG/1
100 CAPSULE ORAL 2 TIMES DAILY
Qty: 12 CAPSULE | Refills: 0 | Status: SHIPPED | OUTPATIENT
Start: 2019-05-06 | End: 2020-02-13 | Stop reason: CLARIF

## 2019-05-06 RX ORDER — LOSARTAN POTASSIUM 25 MG/1
25 TABLET ORAL 2 TIMES DAILY
COMMUNITY
Start: 2019-05-06 | End: 2022-05-17 | Stop reason: DRUGHIGH

## 2019-05-06 RX ORDER — TORSEMIDE 20 MG/1
40 TABLET ORAL DAILY
Status: SHIPPED | COMMUNITY
Start: 2019-05-06

## 2019-05-06 RX ORDER — DOXYCYCLINE 100 MG/1
100 CAPSULE ORAL 2 TIMES DAILY
Refills: 0 | COMMUNITY
Start: 2019-05-02 | End: 2019-05-06 | Stop reason: SDUPTHER

## 2019-05-06 RX ORDER — PREDNISONE 10 MG/1
20 TABLET ORAL 2 TIMES DAILY
Qty: 4 TABLET | Refills: 0 | Status: SHIPPED | OUTPATIENT
Start: 2019-05-06 | End: 2019-12-23 | Stop reason: ALTCHOICE

## 2019-05-06 ASSESSMENT — PATIENT HEALTH QUESTIONNAIRE - PHQ9
SUM OF ALL RESPONSES TO PHQ QUESTIONS 1-9: 0
SUM OF ALL RESPONSES TO PHQ QUESTIONS 1-9: 0
1. LITTLE INTEREST OR PLEASURE IN DOING THINGS: 0
2. FEELING DOWN, DEPRESSED OR HOPELESS: 0
SUM OF ALL RESPONSES TO PHQ9 QUESTIONS 1 & 2: 0

## 2019-05-06 ASSESSMENT — ENCOUNTER SYMPTOMS
WHEEZING: 0
RHINORRHEA: 0
SORE THROAT: 0
VOMITING: 0
TROUBLE SWALLOWING: 0
ABDOMINAL PAIN: 0
COUGH: 1
DIARRHEA: 0
SHORTNESS OF BREATH: 1
NAUSEA: 0
CONSTIPATION: 0
CHEST TIGHTNESS: 0

## 2019-05-06 NOTE — PROGRESS NOTES
Jesus Alberto Gutierrez   Date ofBirth:  1950    Date of Visit:  5/6/2019    Chief Complaint   Patient presents with    Anxiety    Hypertension    Other     Chronic Medical Condition       HPI  Hypertension- Pt takes Losartan 25mg po q day and Carvedilol 6.25mg po bid. Pt decreases salt. Anxiety- Pt takes Xanax 1mg 1/2 tablet po q am and 1 tablet nightly. Pt states his anxiety has been kind of stable. Pt states he has had a panic attack infrequently. Pt states he doesn't like what the antidepressants do to his brain. Pt states they make him feel numb emotionally and he doesn't care. Pt states he doesn't like the physical feelings associated with them. Hyperlipidemia- Pt states he hasn't been taking Atorvastatin. Pt decreases fat and cholesterol. Pt states he eats a healthy diet. Pt states he was walking 30 minutes per day until last week. Hypothyroidism- Pt takes Levothyroxine 100mcg po q day. Pt states he has a little weight gain with eating comfort foods and lack of exercise. Pt denies fatigue, constipation, and cold intolerance. Prediabetes- Pt states he is decreasing carbohydrates. Chronic kidney disease- Pt avoids NSAID's. CHF- Pt takes Torsemide 20mg 2 tablets po qam, Spironolactone 25mg 1/2 tablet daily, and Klor-con 10meq 2 tablets daily. Pt states he has occasional SOB. Pt denies leg edema and chest pain. Pt decreases salt. COPD- Pt takes Symbicort HFA inhaler 160-4.5mcg 2 puffs daily. Pt states he uses ProAir HFA inhaler very infrequently. Pt states he has occasional SOB. Pt denies wheezing and chest tightness. Pt has Bronchitis- Pt is taking Prednisone and Doxycyline. Pt was seen at Urgent Care on 5/2/19. Pt states he still has a little cough and congestion. Pt states his phlegm is clear now. Pt denies wheezing and chest tightness.     Alcohol abuse- Pt states he states drinks a little wine every Sunday at [facility] otherwise patient states he hasn't been drinking the murmur heard. Pulmonary/Chest: Effort normal and breath sounds normal. No respiratory distress. He has no wheezes. He has no rhonchi. He has no rales. Abdominal: Soft. Normal appearance and bowel sounds are normal. He exhibits no distension. There is no hepatosplenomegaly. There is no tenderness. Musculoskeletal: He exhibits no edema. Lymphadenopathy:        Head (right side): No submandibular adenopathy present. Head (left side): No submandibular adenopathy present. Psychiatric: He has a normal mood and affect. Nursing note reviewed.         Results for POC orders placed in visit on 05/06/19   POCT glycosylated hemoglobin (Hb A1C)   Result Value Ref Range    Hemoglobin A1C 6.1 %     Lab Review   Abstract on 03/07/2019   Component Date Value    Sodium 03/07/2019 141     Chloride 03/07/2019 104     Potassium 03/07/2019 4.3     BUN 03/07/2019 30     CREATININE 03/07/2019 1.45     Glucose 03/07/2019 112     Calcium 03/07/2019 9.6     CO2 03/07/2019 31.1     Gfr Calculated 03/07/2019 48     Anion Gap 03/07/2019 6     BNP 03/07/2019 235    Office Visit on 02/04/2019   Component Date Value    LIPID PANEL 02/15/2019 NA     Cholesterol, Total 02/15/2019 193     Triglycerides 02/15/2019 109     HDL 02/15/2019 52     LDL Direct 02/15/2019 119     VLDL 02/15/2019 22     COMPREHENSIVE METABOLIC * 30/75/8293 NA     Sodium 02/15/2019 138     Potassium 02/15/2019 4.0     Chloride 02/15/2019 100     CO2 02/15/2019 32.7*    Anion Gap 02/15/2019 5*    POC Glucose 02/15/2019 117*    BUN 02/15/2019 44*    CREATININE 02/15/2019 1.66*    Calcium 02/15/2019 9.4     Alb 02/15/2019 3.6     Total Protein 02/15/2019 7.2     Total Bilirubin 02/15/2019 0.45     Alkaline Phosphatase 02/15/2019 151*    AST 02/15/2019 12*    ALT 02/15/2019 20*    Age Time 02/15/2019 68     GFR Non- 02/15/2019 41     GFR  02/15/2019 50     Thyrotropin Releasing Ho* 02/15/2019 1.57 Orders Only on 01/04/2019   Component Date Value    BNP 01/04/2019 84     BNP 02/15/2019 111     BNP 02/25/2019 77     BASIC METABOLIC PANEL 09/25/6886 NA     Sodium 02/25/2019 136*    Potassium 02/25/2019 4.2     Chloride 02/25/2019 98     CO2 02/25/2019 30.8     Anion Gap 02/25/2019 7     POC Glucose 02/25/2019 120*    BUN 02/25/2019 36*    CREATININE 02/25/2019 1.66*    Calcium 02/25/2019 9.6     Age Time 02/25/2019 68     GFR Non- 02/25/2019 41     GFR  02/25/2019 50    Orders Only on 01/04/2019   Component Date Value    BASIC METABOLIC PANEL 65/76/4444 NA     Sodium 01/04/2019 140     Potassium 01/04/2019 4.3     Chloride 01/04/2019 102     CO2 01/04/2019 31.7     Anion Gap 01/04/2019 6*    POC Glucose 01/04/2019 112*    BUN 01/04/2019 28*    CREATININE 01/04/2019 1.37*    Calcium 01/04/2019 9.3     Age Time 01/04/2019 68     GFR Non- 01/04/2019 52     GFR  01/04/2019 63          Assessment/Plan     1. Essential hypertension  - stable  - Continue carvedilol (COREG) 6.25 MG tablet; Take 1 tablet by mouth 2 times daily (with meals)  -Continue losartan (COZAAR) 25 MG tablet; Take 1 tablet by mouth daily  -Low sodium diet  -Regular aerobic exercise    2. Generalized anxiety disorder  -stable  -Continue same medications    3. Mixed hyperlipidemia  -Take Atorvastatin as prescribed  -Low fat, low cholesterol diet  -Regular aerobic exercise    4. Acquired hypothyroidism  -stable  -Continue same medications    5. Prediabetes  -Hgb A1c-6.1%  -Low carbohydrate diet  -Regular aerobic exercise  - POCT glycosylated hemoglobin (Hb A1C)    6. Stage 3 chronic kidney disease (HCC)  -stable  -Avoid NSAID's such as otc Ibuprofen, Advil, Motrin, Naprosyn, and Aleve as well as prescription NSAID's  -patient has labs done regularly    7.  Chronic systolic heart failure (HCC)  -stable  -Continue same medications  -Continue spironolactone (ALDACTONE) 25 MG tablet; Take 0.5 tablets by mouth daily  -Continue care per Cardiology    8. Chronic obstructive pulmonary disease, unspecified COPD type ([facility] [address]  -stable  -Continue same medications    9. Bronchitis  -resolving  -Complete doxycycline monohydrate (MONODOX) 100 MG capsule; Take 1 capsule by mouth 2 times daily  Dispense: 12 capsule; Refill: 0  -Complete predniSONE (DELTASONE) 10 MG tablet; Take 2 tablets by mouth 2 times daily  Dispense: 4 tablet; Refill: 0    10. Alcohol abuse  - patient reports not drinking other than wine for communion at [employer]  -continue to work on alcohol cessation    Discussed medications with patient, who voiced understanding of their use and indications. All questions answered. Controlled Substances Monitoring:     RX Monitoring 5/6/2019   Attestation The Prescription Monitoring Report for this patient was reviewed today. Chronic Pain Routine Monitoring -         Return in about 3 months (around 8/6/2019) for anxiety, hypertension, hyperlipidemia, hypothyroidism, and  COPD.

## 2019-05-06 NOTE — PATIENT INSTRUCTIONS
1. Essential hypertension  - stable  - Continue carvedilol (COREG) 6.25 MG tablet; Take 1 tablet by mouth 2 times daily (with meals)  -Continue losartan (COZAAR) 25 MG tablet; Take 1 tablet by mouth daily  -Low sodium diet  -Regular aerobic exercise    2. Generalized anxiety disorder  -stable  -Continue same medications    3. Mixed hyperlipidemia  -Take Atorvastatin as prescribed  -Low fat, low cholesterol diet  -Regular aerobic exercise    4. Acquired hypothyroidism  -stable  -Continue same medications    5. Prediabetes  -Low carbohydrate diet  -Regular aerobic exercise  - POCT glycosylated hemoglobin (Hb A1C)    6. Stage 3 chronic kidney disease (HCC)  -stable  -Avoid NSAID's such as otc Ibuprofen, Advil, Motrin, Naprosyn, and Aleve as well as prescription NSAID's  -patient has labs done regularly    7. Chronic systolic heart failure (HCC)  -stable  -Continue same medications  -Continue spironolactone (ALDACTONE) 25 MG tablet; Take 0.5 tablets by mouth daily  -Continue care per Cardiology    8. Chronic obstructive pulmonary disease, unspecified COPD type ([facility]  -stable  -Continue same medications    9. Bronchitis  -resolving  -complete Prednisone as prescribed  -Complete Doxycycline as prescribed    10.  Alcohol abuse  - patient reports not drinking other than wine for communion at [employer]  -continue to work on alcohol cessation

## 2019-05-06 NOTE — PROGRESS NOTES
PHQ Scores 5/6/2019 3/12/2018 1/23/2017 11/3/2015   PHQ2 Score 0 0 0 0   PHQ9 Score 0 0 0 0     Interpretation of Total Score Depression Severity: 1-4 = Minimal depression, 5-9 = Mild depression, 10-14 = Moderate depression, 15-19 = Moderately severe depression, 20-27 = Severe depression

## 2019-05-29 DIAGNOSIS — F41.1 GENERALIZED ANXIETY DISORDER: ICD-10-CM

## 2019-05-29 RX ORDER — ALPRAZOLAM 1 MG/1
TABLET ORAL
Qty: 45 TABLET | Refills: 2 | Status: SHIPPED | OUTPATIENT
Start: 2019-05-29 | End: 2019-09-23 | Stop reason: SDUPTHER

## 2019-05-29 NOTE — TELEPHONE ENCOUNTER
Medication:   Requested Prescriptions     Pending Prescriptions Disp Refills    ALPRAZolam (XANAX) 1 MG tablet [Pharmacy Med Name: ALPRAZOLAM 1 MG TABLET] 45 tablet 2     Sig: TAKE 1/2 TABLET BY MOUTH ONCE DAILY AND 1 TABLET NIGHTLY.        Last Filled:   2/19/2019    Patient Phone Number: [phone] (home)     Last appt: 5/6/2019   Next appt: 8/6/2019    Last Labs DM:   Lab Results   Component Value Date    LABA1C 6.1 05/06/2019     Last Lipid:   Lab Results   Component Value Date    CHOL 193 02/15/2019    TRIG 109 02/15/2019    HDL 52 02/15/2019    LDLCALC 81 06/03/2015     Last PSA:   Lab Results   Component Value Date    PSA 0.88 09/17/2018     Last Thyroid:   Lab Results   Component Value Date    TSH 0.80 11/01/2018    T4FREE 1.27 06/29/2018

## 2019-06-02 NOTE — PROGRESS NOTES
Week       nicotine (NICODERM CQ) 21 MG/24HR Place 1 patch onto the skin every 24 hours      Multiple Vitamins-Minerals (CENTRUM SILVER ADULT 50+ PO) Take 1 tablet by mouth daily      aspirin 81 MG tablet Take 1 tablet by mouth daily. 30 tablet 5    albuterol (PROAIR HFA) 108 (90 BASE) MCG/ACT inhaler Inhale 2 puffs into the lungs every 6 hours as needed. Vitals:    02/13/18 1027   BP: 110/60   Site: Right Arm   Position: Sitting   Cuff Size: Large Adult   Pulse: 96   SpO2: 96%   Weight: 212 lb (96.2 kg)   Height: 5' 7\" (1.702 m)     Body mass index is 33.2 kg/m². Physical Exam   Constitutional: He appears well-developed and well-nourished. No distress. HENT:   Mouth/Throat: Oropharynx is clear and moist and mucous membranes are normal.   Eyes: Conjunctivae, EOM and lids are normal. Pupils are equal, round, and reactive to light. Neck: Neck supple. Carotid bruit is not present. No thyromegaly present. Cardiovascular: Normal rate, regular rhythm, S1 normal, S2 normal and normal heart sounds. Exam reveals no gallop and no friction rub. No murmur heard. Pulmonary/Chest: Effort normal and breath sounds normal. He has no wheezes. He has no rhonchi. He has no rales. Abdominal: Soft. Normal appearance and bowel sounds are normal. He exhibits no distension. There is no hepatosplenomegaly. There is no tenderness. A hernia is present. Hernia confirmed positive in the ventral area. Ventral hernia and umbilical hernia   Lymphadenopathy:        Head (right side): No submandibular adenopathy present. Head (left side): No submandibular adenopathy present. Psychiatric: He has a normal mood and affect. Assessment/Plan     1. Shortness of breath    - XR CHEST STANDARD (2 VW); Future    2. Umbilical hernia without obstruction and without gangrene    - CT ABDOMEN PELVIS W IV CONTRAST; Future    3. Anxiety      4. Lower abdominal pain    - Comprehensive Metabolic Panel;  Future  - CBC
81.6

## 2019-08-13 ENCOUNTER — OFFICE VISIT (OUTPATIENT)
Dept: PRIMARY CARE CLINIC | Age: 69
End: 2019-08-13
Payer: MEDICARE

## 2019-08-13 VITALS
OXYGEN SATURATION: 98 % | SYSTOLIC BLOOD PRESSURE: 122 MMHG | HEIGHT: 67 IN | DIASTOLIC BLOOD PRESSURE: 62 MMHG | WEIGHT: 184.6 LBS | HEART RATE: 76 BPM | BODY MASS INDEX: 28.97 KG/M2

## 2019-08-13 DIAGNOSIS — I10 ESSENTIAL HYPERTENSION: Primary | ICD-10-CM

## 2019-08-13 DIAGNOSIS — E78.2 MIXED HYPERLIPIDEMIA: ICD-10-CM

## 2019-08-13 DIAGNOSIS — J44.9 CHRONIC OBSTRUCTIVE PULMONARY DISEASE, UNSPECIFIED COPD TYPE (HCC): ICD-10-CM

## 2019-08-13 DIAGNOSIS — N18.30 STAGE 3 CHRONIC KIDNEY DISEASE (HCC): ICD-10-CM

## 2019-08-13 DIAGNOSIS — F10.10 ALCOHOL ABUSE: ICD-10-CM

## 2019-08-13 DIAGNOSIS — F41.1 GENERALIZED ANXIETY DISORDER: ICD-10-CM

## 2019-08-13 DIAGNOSIS — R73.03 PREDIABETES: ICD-10-CM

## 2019-08-13 DIAGNOSIS — E03.9 ACQUIRED HYPOTHYROIDISM: ICD-10-CM

## 2019-08-13 PROCEDURE — 99214 OFFICE O/P EST MOD 30 MIN: CPT | Performed by: INTERNAL MEDICINE

## 2019-08-13 NOTE — PATIENT INSTRUCTIONS
-Fast 8-10 hours for labs  -Continue same medications  -Low fat, low cholesterol diet  -Low sodium diet  -low carbohydrate diet  -Regular aerobic exercise

## 2019-08-13 NOTE — LETTER
stimulants, such as caffeine pills or energy drinks, certain weight loss supplements and oral decongestants. Dependence withdrawal symptoms may include depressed mood, loss of interest, suicidal thoughts, anxiety, fatigue, appetite changes and agitation. Testosterone replacement therapy:  Potential side effects include increased risk of stroke and heart attack, blood clots, increased blood pressure, increased cholesterol, enlarged prostate, sleep apnea, irritability/aggression and other mood disorders, and decreased fertility. Other:     1. I understand that I have the following responsibilities:  · I will take medications at the dose and frequency prescribed. · I will not increase or change how I take my medications without the approval of the health care provider who signs this Medication Agreement. · I will arrange for refills at the prescribed interval ONLY during regular office hours. I will not ask for refills earlier than agreed, after-hours, on holidays or on weekends. · I will obtain all refills for these medications at  ·  _______[facility]/pharmacy #[id number]- [place], [place] - [address] -  [phone] - F [phone]_____________________________  pharmacy (phone number  ·  ________________________), with full consent for my provider and pharmacist to exchange information in writing or verbally. · I will not request any pain medications or controlled substances from other providers and will inform this provider of all other medications I am taking. · I will inform my other health care providers that I am taking these medications and of the existence of this [facility]. In the event of an emergency, I will provide the same information to the emergency department providers. · I will protect my prescriptions and medications. I understand that lost or misplaced prescriptions will not be replaced.   · I will keep medications only for my own use and will not share them with · I develop significant side effects from the medication. · My behavior is inconsistent with the responsibilities outlined above, which may also result in my being prevented from receiving further care from this office. · Other:____________________________________________________________________    AGREEMENT:    I have read the above and have had all of my questions answered. For chronic disease management, I know that my symptoms can be managed with many types of treatments. A chronic medication trial may be part of my treatment, but I must be an active participant in my care. Medication therapy is only one part of my symptom management plan. In some cases, there may be limited scientific evidence to support the chronic use of certain medications to improve symptoms and daily function. Furthermore, in certain circumstances, there may be scientific information that suggests that use of chronic controlled substances may actually worsen my symptoms and increase my risk of unintentional death directly related to this medication therapy. I know that if my provider feels my risk from controlled medications is greater than my benefit, I will have my controlled substance medication(s) compassionately lowered or removed altogether. I agree to a controlled substance medication trial.      I further agree to allow this office to contact my HIPAA contact on file if there are concerns about my safety and use of controlled medications. I have agreed to use the following medications above as instructed by my physician and as stated in this [facility] [address].      Patient Signature:  ______________________  Date:8/13/2019 or _____________    Provider Signature:___  ___________________  Date:8/13/2019 or _____________

## 2019-08-13 NOTE — PROGRESS NOTES
Chloride 05/13/2019 98     CO2 05/13/2019 34.2*    Anion Gap 05/13/2019 6*    POC Glucose 05/13/2019 108     BUN 05/13/2019 37*    CREATININE 05/13/2019 1.75*    Calcium 05/13/2019 9.7     Age Time 05/13/2019 68     GFR Non- 05/13/2019 39     GFR  05/13/2019 47    Office Visit on 05/06/2019   Component Date Value    Hemoglobin A1C 05/06/2019 6.1    Abstract on 03/07/2019   Component Date Value    Sodium 03/07/2019 141     Chloride 03/07/2019 104     Potassium 03/07/2019 4.3     BUN 03/07/2019 30     CREATININE 03/07/2019 1.45     Glucose 03/07/2019 112     Calcium 03/07/2019 9.6     CO2 03/07/2019 31.1     Gfr Calculated 03/07/2019 48     Anion Gap 03/07/2019 6     BNP 03/07/2019 235          Assessment/Plan     1. Essential hypertension    - Comprehensive Metabolic Panel; Future    2. Mixed hyperlipidemia    - Comprehensive Metabolic Panel; Future  - Lipid Panel; Future    3. Acquired hypothyroidism    - TSH without Reflex; Future    4. Generalized anxiety disorder    5. Stage 3 chronic kidney disease ([facility] [address]    - Comprehensive Metabolic Panel; Future    6. Prediabetes    - Hemoglobin A1C; Future    7. Chronic obstructive pulmonary disease, unspecified COPD type ([address]      8. Alcohol abuse        Discussedmedications with patient, who voiced understanding of their use and indications. All questions answered. Return in about 3 months (around 11/13/2019) for hypertension, anxiety, hyperlipidemia, COPD, and chronic medical conditions.

## 2019-08-15 ENCOUNTER — TELEPHONE (OUTPATIENT)
Dept: PRIMARY CARE CLINIC | Age: 69
End: 2019-08-15

## 2019-08-19 DIAGNOSIS — E03.9 ACQUIRED HYPOTHYROIDISM: ICD-10-CM

## 2019-08-19 RX ORDER — LEVOTHYROXINE SODIUM 0.1 MG/1
TABLET ORAL
Qty: 30 TABLET | Refills: 3 | Status: SHIPPED | OUTPATIENT
Start: 2019-08-19 | End: 2019-12-16 | Stop reason: SDUPTHER

## 2019-08-20 DIAGNOSIS — J44.9 CHRONIC OBSTRUCTIVE PULMONARY DISEASE, UNSPECIFIED COPD TYPE (HCC): ICD-10-CM

## 2019-08-20 RX ORDER — BUDESONIDE AND FORMOTEROL FUMARATE DIHYDRATE 160; 4.5 UG/1; UG/1
2 AEROSOL RESPIRATORY (INHALATION) 2 TIMES DAILY
Qty: 10.2 INHALER | Refills: 3 | Status: SHIPPED | OUTPATIENT
Start: 2019-08-20 | End: 2020-01-09

## 2019-09-02 LAB
ESTIMATED AVERAGE GLUCOSE: 122.6
HBA1C MFR BLD: 5.9 % (ref 4.5–6.2)
THYROTROPIN RELEASING HORMONE: 2.44 UIU/ML (ref 0.34–4.82)

## 2019-09-23 DIAGNOSIS — F41.1 GENERALIZED ANXIETY DISORDER: ICD-10-CM

## 2019-09-23 RX ORDER — ALPRAZOLAM 1 MG/1
TABLET ORAL
Qty: 45 TABLET | Refills: 1 | Status: SHIPPED | OUTPATIENT
Start: 2019-09-23 | End: 2019-12-02 | Stop reason: SDUPTHER

## 2019-09-23 NOTE — TELEPHONE ENCOUNTER
Medication:   Requested Prescriptions     Pending Prescriptions Disp Refills    ALPRAZolam (XANAX) 1 MG tablet [Pharmacy Med Name: ALPRAZOLAM 1 MG TABLET] 45 tablet 2     Sig: TAKE 1/2 TABLET BY MOUTH ONCE DAILY AND 1 TABLET NIGHTLY. Last Filled:  8/19/2019    Patient Phone Number: [phone] (home)     Last appt: 8/13/2019   Next appt: 11/13/2019    Last OARRS:   RX Monitoring 8/13/2019   Attestation -   Periodic Controlled Substance Monitoring No signs of potential drug abuse or diversion identified. ;Random urine drug screen sent today. Preferred Pharmacy:   [facility]/pharmacy #[id number]- [facility] [address] [phone] - F [phone]  [address]  [address] [facility]  Phone: [phone] Fax: [phone]    [facility] [address] [address]  Phone: [phone] Fax: [phone]

## 2019-09-24 NOTE — TELEPHONE ENCOUNTER
Controlled Substance Monitoring:    Acute and Chronic Pain Monitoring:   RX Monitoring 8/13/2019   Attestation -   Periodic Controlled Substance Monitoring No signs of potential drug abuse or diversion identified. ;Random urine drug screen sent today.

## 2019-09-30 RX ORDER — ATORVASTATIN CALCIUM 40 MG/1
TABLET, FILM COATED ORAL
Qty: 30 TABLET | Refills: 3 | Status: SHIPPED | OUTPATIENT
Start: 2019-09-30 | End: 2020-03-23

## 2019-11-13 ENCOUNTER — OFFICE VISIT (OUTPATIENT)
Dept: PRIMARY CARE CLINIC | Age: 69
End: 2019-11-13
Payer: MEDICARE

## 2019-11-13 VITALS
DIASTOLIC BLOOD PRESSURE: 70 MMHG | HEART RATE: 94 BPM | HEIGHT: 67 IN | SYSTOLIC BLOOD PRESSURE: 110 MMHG | BODY MASS INDEX: 30.35 KG/M2 | WEIGHT: 193.4 LBS | OXYGEN SATURATION: 98 %

## 2019-11-13 DIAGNOSIS — Z23 NEED FOR SHINGLES VACCINE: ICD-10-CM

## 2019-11-13 DIAGNOSIS — E03.9 ACQUIRED HYPOTHYROIDISM: ICD-10-CM

## 2019-11-13 DIAGNOSIS — Z23 NEED FOR INFLUENZA VACCINATION: ICD-10-CM

## 2019-11-13 DIAGNOSIS — Z23 NEED FOR TDAP VACCINATION: ICD-10-CM

## 2019-11-13 DIAGNOSIS — F41.1 GENERALIZED ANXIETY DISORDER: ICD-10-CM

## 2019-11-13 DIAGNOSIS — I10 ESSENTIAL HYPERTENSION: Primary | ICD-10-CM

## 2019-11-13 DIAGNOSIS — E78.2 MIXED HYPERLIPIDEMIA: ICD-10-CM

## 2019-11-13 DIAGNOSIS — J44.9 CHRONIC OBSTRUCTIVE PULMONARY DISEASE, UNSPECIFIED COPD TYPE (HCC): ICD-10-CM

## 2019-11-13 PROCEDURE — 99214 OFFICE O/P EST MOD 30 MIN: CPT | Performed by: INTERNAL MEDICINE

## 2019-11-13 PROCEDURE — 90653 IIV ADJUVANT VACCINE IM: CPT | Performed by: INTERNAL MEDICINE

## 2019-11-13 PROCEDURE — G0008 ADMIN INFLUENZA VIRUS VAC: HCPCS | Performed by: INTERNAL MEDICINE

## 2019-11-13 ASSESSMENT — ENCOUNTER SYMPTOMS
CHEST TIGHTNESS: 0
NAUSEA: 0
SHORTNESS OF BREATH: 0
DIARRHEA: 0
TROUBLE SWALLOWING: 0
WHEEZING: 0
VOMITING: 0
ABDOMINAL PAIN: 0
SORE THROAT: 0
COUGH: 0
CONSTIPATION: 0
RHINORRHEA: 0

## 2019-11-16 LAB
6-ACETYLMORPHINE: NOT DETECTED
7-AMINOCLONAZEPAM: NOT DETECTED
ALPHA-OH-ALPRAZOLAM: PRESENT
ALPRAZOLAM: PRESENT
AMPHETAMINE: NOT DETECTED
BARBITURATES: NOT DETECTED
BENZOYLECGONINE: NOT DETECTED
BUPRENORPHINE: NOT DETECTED
CARISOPRODOL: NOT DETECTED
CLONAZEPAM: NOT DETECTED
CODEINE: NOT DETECTED
CREATININE URINE: 64.3 MG/DL (ref 20–400)
DIAZEPAM: NOT DETECTED
DRUGS EXPECTED: NORMAL
EER PAIN MGT DRUG PANEL, HIGH RES/EMIT U: NORMAL
ETHYL GLUCURONIDE: PRESENT
FENTANYL: NOT DETECTED
HYDROCODONE: NOT DETECTED
HYDROMORPHONE: NOT DETECTED
LORAZEPAM: NOT DETECTED
MARIJUANA METABOLITE: NOT DETECTED
MDA: NOT DETECTED
MDEA: NOT DETECTED
MDMA URINE: NOT DETECTED
MEPERIDINE: NOT DETECTED
METHADONE: NOT DETECTED
METHAMPHETAMINE: NOT DETECTED
METHYLPHENIDATE: NOT DETECTED
MIDAZOLAM: NOT DETECTED
MORPHINE: NOT DETECTED
NORBUPRENORPHINE, FREE: NOT DETECTED
NORDIAZEPAM: NOT DETECTED
NORFENTANYL: NOT DETECTED
NORHYDROCODONE, URINE: NOT DETECTED
NOROXYCODONE: NOT DETECTED
NOROXYMORPHONE, URINE: NOT DETECTED
OXAZEPAM: NOT DETECTED
OXYCODONE: NOT DETECTED
OXYMORPHONE: NOT DETECTED
PAIN MANAGEMENT DRUG PANEL: NORMAL
PAIN MANAGEMENT DRUG PANEL: NORMAL
PCP: NOT DETECTED
PHENTERMINE: NOT DETECTED
PROPOXYPHENE: NOT DETECTED
TAPENTADOL, URINE: NOT DETECTED
TAPENTADOL-O-SULFATE, URINE: NOT DETECTED
TEMAZEPAM: NOT DETECTED
TRAMADOL: NOT DETECTED
ZOLPIDEM: NOT DETECTED

## 2019-11-22 LAB
AGE TIME: 69 YRS
ALBUMIN SERPL-MCNC: 4 G/DL (ref 3.5–5)
ALP BLD-CCNC: 94 U/L (ref 46–116)
ALT SERPL-CCNC: 19 U/L (ref 21–72)
ANION GAP SERPL CALCULATED.3IONS-SCNC: 8 MMOL/L (ref 7–16)
AST SERPL-CCNC: 14 U/L (ref 17–59)
B-TYPE NATRIURETIC PEPTIDE: 143 PG/ML
BILIRUB SERPL-MCNC: 0.49 MG/DL (ref 0.1–1.2)
BUN BLDV-MCNC: 24 MG/DL (ref 7–20)
CALCIUM SERPL-MCNC: 9 MG/DL (ref 8.5–10.1)
CHLORIDE BLD-SCNC: 104 MMOL/L (ref 98–108)
CHOLESTEROL, TOTAL: 203 MG/DL (ref 0–200)
CO2: 28.7 MMOL/L (ref 21–32)
COMPREHENSIVE METABOLIC PANEL: ABNORMAL
CREAT SERPL-MCNC: 1.46 MG/DL (ref 0.7–1.3)
GFR AFRICAN AMERICAN: 58 ML/MIN
GFR NON-AFRICAN AMERICAN: 48 ML/MIN
GLUCOSE BLD-MCNC: 87 MG/DL (ref 70–110)
HDLC SERPL-MCNC: 58 MG/DL (ref 40–100)
LDL CHOLESTEROL DIRECT: 120 MG/DL (ref 0–160)
LIPID PANEL: ABNORMAL
POTASSIUM SERPL-SCNC: 4.4 MMOL/L (ref 3.6–5)
SODIUM BLD-SCNC: 141 MMOL/L (ref 137–148)
TOTAL PROTEIN: 7 G/DL (ref 6.4–8.2)
TRIGL SERPL-MCNC: 127 MG/DL (ref 0–150)
VLDLC SERPL CALC-MCNC: 25 MG/DL

## 2019-11-22 NOTE — CARE COORDINATION
Left message for patient to call to discuss recommendations and inform that  had sent in the increased dose of Hydralazine already.    Improving (8/17/2018)             I will keep my appointment or reschedule if I have to cancel. I will follow my Zone Management tool to seek urgent or emergent care. I will notify my provider of any symptoms that indicate a worsening of my condition. Barriers: impairment:  substance abuse: alcohol, overwhelmed by complexity of regimen and stress  Plan for overcoming my barriers: engagement with [facility] and Care Coordination team  Confidence: 7/10  Anticipated Goal Completion Date: 9/27/18              Prior to Admission medications    Medication Sig Start Date End Date Taking? Authorizing Provider   Tdap (ADACEL) 5-2-15.5 LF-MCG/0.5 injection Inject 0.5 mLs into the muscle once for 1 dose 8/17/18 8/17/18  Fatuma Walker MD   ALPRAZolam [place]) 1 MG tablet Take 1 mg by mouth 3 times daily. . 4/18/18   Historical Provider, MD   carvedilol (COREG) 3.125 MG tablet Take 3.125 mg by mouth daily 4/27/18   Historical Provider, MD   KLOR-CON 10 10 MEQ extended release tablet Take 10 mEq by mouth daily 4/27/18   Historical Provider, MD   torsemide (DEMADEX) 20 MG tablet Take 80 mg by mouth 2 times daily 6/28/18   Historical Provider, MD   SYMBICORT 160-4.5 MCG/ACT AERO INHALE 2 PUFFS INTO THE LUNGS 2 TIMES DAILY 5/23/18   Fatuma Walker MD   lisinopril (PRINIVIL;ZESTRIL) 2.5 MG tablet Take 1 tablet by mouth daily 4/4/18   Serenity Abena, APRN - CNP   potassium chloride (KLOR-CON M) 10 MEQ extended release tablet Take 1 tablet by mouth daily 4/4/18   Marietta Abena, APRN - CNP   levothyroxine (SYNTHROID) 125 MCG tablet TAKE 1 TABLET BY MOUTH DAILY 3/26/18   Fatuma Walker MD   atorvastatin (LIPITOR) 40 MG tablet TAKE 1 TABLET BY MOUTH NIGHTLY 2/13/18   Fatuma Walker MD   albuterol (PROVENTIL) (2.5 MG/3ML) 0.083% nebulizer solution Take 2.5 mg by nebulization every 6 hours as needed for Wheezing or Shortness of Breath    Historical Provider, MD   triamcinolone (KENALOG) 0.1 % cream Apply to affecteds area twice daily for up to 2 weeks or until improved. For eczema. 12/13/16   Dialol Krueger MD   nicotine (NICODERM CQ) 21 MG/24HR Place 1 patch onto the skin every 24 hours    Historical Provider, MD   Multiple Vitamins-Minerals (CENTRUM SILVER ADULT 50+ PO) Take 1 tablet by mouth daily    Historical Provider, MD   aspirin 81 MG tablet Take 1 tablet by mouth daily. 7/25/13   Joe Jonas MD   albuterol (PROAIR HFA) 108 (90 BASE) MCG/ACT inhaler Inhale 2 puffs into the lungs every 6 hours as needed.       Historical Provider, MD       Future Appointments  Date Time Provider Vicky Noriega   2/25/2019 1:15 PM MD Deepa Tan [facility]     ,   Congestive Heart Failure Assessment    Are you currently restricting fluids?:  No Restriction  Do you understand a low sodium diet?:  Yes  Do you understand how to read food labels?:  Yes  Do you salt your food before tasting it?:  No     No patient-reported symptoms      Symptoms:   None:  Yes      Symptom course:  improving  Weight trend:  decreasing steadily  Salt intake watch compared to last visit:  improved     ,   COPD Assessment    Does the patient understand envrionmental exposure?:  Yes  Is the patient able to verbalize Rescue vs. Long Acting medications?:  Yes  Does the patient have a nebulizer?:  No  Does the patient use a space with inhaled medications?:  No     No patient-reported symptoms         Symptoms:      Have you had a recent diagnosis of pneumonia either by PCP or at a hospital?:  No      and   General Assessment    Do you have any symptoms that are causing concern?:  No

## 2019-12-02 DIAGNOSIS — F41.1 GENERALIZED ANXIETY DISORDER: ICD-10-CM

## 2019-12-02 RX ORDER — ALPRAZOLAM 1 MG/1
TABLET ORAL
Qty: 45 TABLET | Refills: 1 | Status: SHIPPED | OUTPATIENT
Start: 2019-12-02 | End: 2020-03-02

## 2019-12-16 DIAGNOSIS — E03.9 ACQUIRED HYPOTHYROIDISM: ICD-10-CM

## 2019-12-17 RX ORDER — LEVOTHYROXINE SODIUM 0.1 MG/1
TABLET ORAL
Qty: 30 TABLET | Refills: 5 | Status: SHIPPED | OUTPATIENT
Start: 2019-12-17 | End: 2020-05-14 | Stop reason: SDUPTHER

## 2019-12-23 ENCOUNTER — OFFICE VISIT (OUTPATIENT)
Dept: PRIMARY CARE CLINIC | Age: 69
End: 2019-12-23
Payer: MEDICARE

## 2019-12-23 VITALS
DIASTOLIC BLOOD PRESSURE: 70 MMHG | TEMPERATURE: 98.1 F | OXYGEN SATURATION: 97 % | HEIGHT: 67 IN | SYSTOLIC BLOOD PRESSURE: 118 MMHG | BODY MASS INDEX: 29.35 KG/M2 | HEART RATE: 74 BPM | WEIGHT: 187 LBS | RESPIRATION RATE: 16 BRPM

## 2019-12-23 DIAGNOSIS — Z00.00 ROUTINE GENERAL MEDICAL EXAMINATION AT A HEALTH CARE FACILITY: Primary | ICD-10-CM

## 2019-12-23 PROCEDURE — G0438 PPPS, INITIAL VISIT: HCPCS | Performed by: INTERNAL MEDICINE

## 2019-12-23 ASSESSMENT — PATIENT HEALTH QUESTIONNAIRE - PHQ9
SUM OF ALL RESPONSES TO PHQ QUESTIONS 1-9: 1
1. LITTLE INTEREST OR PLEASURE IN DOING THINGS: 1
SUM OF ALL RESPONSES TO PHQ QUESTIONS 1-9: 1
2. FEELING DOWN, DEPRESSED OR HOPELESS: 0
SUM OF ALL RESPONSES TO PHQ9 QUESTIONS 1 & 2: 1

## 2019-12-23 ASSESSMENT — LIFESTYLE VARIABLES
HOW OFTEN DURING THE LAST YEAR HAVE YOU FAILED TO DO WHAT WAS NORMALLY EXPECTED FROM YOU BECAUSE OF DRINKING: 0
HOW OFTEN DURING THE LAST YEAR HAVE YOU BEEN UNABLE TO REMEMBER WHAT HAPPENED THE NIGHT BEFORE BECAUSE YOU HAD BEEN DRINKING: 0
AUDIT-C TOTAL SCORE: 2
HAS A RELATIVE, FRIEND, DOCTOR, OR ANOTHER HEALTH PROFESSIONAL EXPRESSED CONCERN ABOUT YOUR DRINKING OR SUGGESTED YOU CUT DOWN: 0
AUDIT TOTAL SCORE: 2
HOW MANY STANDARD DRINKS CONTAINING ALCOHOL DO YOU HAVE ON A TYPICAL DAY: 0
HOW OFTEN DO YOU HAVE A DRINK CONTAINING ALCOHOL: 2
HOW OFTEN DURING THE LAST YEAR HAVE YOU HAD A FEELING OF GUILT OR REMORSE AFTER DRINKING: 0
HOW OFTEN DO YOU HAVE SIX OR MORE DRINKS ON ONE OCCASION: 0
HOW OFTEN DURING THE LAST YEAR HAVE YOU FOUND THAT YOU WERE NOT ABLE TO STOP DRINKING ONCE YOU HAD STARTED: 0
HAVE YOU OR SOMEONE ELSE BEEN INJURED AS A RESULT OF YOUR DRINKING: 0
HOW OFTEN DURING THE LAST YEAR HAVE YOU NEEDED AN ALCOHOLIC DRINK FIRST THING IN THE MORNING TO GET YOURSELF GOING AFTER A NIGHT OF HEAVY DRINKING: 0

## 2020-01-09 RX ORDER — BUDESONIDE AND FORMOTEROL FUMARATE DIHYDRATE 160; 4.5 UG/1; UG/1
2 AEROSOL RESPIRATORY (INHALATION) 2 TIMES DAILY
Qty: 10.2 INHALER | Refills: 3 | Status: SHIPPED | OUTPATIENT
Start: 2020-01-09 | End: 2020-05-26

## 2020-01-09 NOTE — TELEPHONE ENCOUNTER
Medication:   Requested Prescriptions     Pending Prescriptions Disp Refills    SYMBICORT 160-4.5 MCG/ACT AERO [Pharmacy Med Name: Loura Bending 160-4.5 MCG INHALER] 10.2 Inhaler 3     Sig: INHALE 2 PUFFS INTO THE LUNGS 2 TIMES DAILY     Last Filled:  8/20/19    Last appt: 12/23/2019   Next appt: 2/11/2020

## 2020-02-13 ENCOUNTER — OFFICE VISIT (OUTPATIENT)
Dept: PRIMARY CARE CLINIC | Age: 70
End: 2020-02-13
Payer: MEDICARE

## 2020-02-13 VITALS
HEART RATE: 86 BPM | WEIGHT: 191.6 LBS | OXYGEN SATURATION: 97 % | BODY MASS INDEX: 30.07 KG/M2 | DIASTOLIC BLOOD PRESSURE: 82 MMHG | RESPIRATION RATE: 16 BRPM | SYSTOLIC BLOOD PRESSURE: 160 MMHG | HEIGHT: 67 IN

## 2020-02-13 PROCEDURE — 99214 OFFICE O/P EST MOD 30 MIN: CPT | Performed by: INTERNAL MEDICINE

## 2020-02-13 RX ORDER — COLCHICINE 0.6 MG/1
1 TABLET ORAL DAILY
COMMUNITY
Start: 2019-06-17 | End: 2021-12-16 | Stop reason: SDUPTHER

## 2020-02-13 ASSESSMENT — PATIENT HEALTH QUESTIONNAIRE - PHQ9
2. FEELING DOWN, DEPRESSED OR HOPELESS: 0
SUM OF ALL RESPONSES TO PHQ QUESTIONS 1-9: 0
SUM OF ALL RESPONSES TO PHQ9 QUESTIONS 1 & 2: 0
SUM OF ALL RESPONSES TO PHQ QUESTIONS 1-9: 0
1. LITTLE INTEREST OR PLEASURE IN DOING THINGS: 0

## 2020-02-13 NOTE — PROGRESS NOTES
Jessica Pastor   Date ofBirth:  1950    Date of Visit:  2/13/2020    Chief Complaint   Patient presents with    Hypertension    Hyperlipidemia    COPD    Anxiety    Hypothyroidism       HPI  Hypertension-Pt takes Losartan 25mg po q day and Carvedilol 6.25mg po bid. Pt's blood pressure is high. Pt states he just took his BP medication prior to coming in exam room. Pt states his blood pressure has been normal when he takes his BP medication daily. Pt decreases salt. Pt walks 2 miles 2 times per week.     Hyperlipidemia- Pt states he is taking Atorvastatin 40mg nightly regularly now. Pt decreases fat and cholesterol in his diet. Pt walks 2 miles 2 times per week. Anxiety- Pt takes Xanax 1mg 1/2 tablet in evening and 1 tablet at bedtime. Pt has anxiety.     Hypothyroidism- Pt takes Levothyroxine 100mcg po q day. Pt denies fatigue, weight gain, constipation, and cold intolerance.     COPD- Pt uses Symbicort inhaler 160-4.5mcg twice daily. Pt uses ProAir HFA inhaler as needed. Pt states he hasn't had any issues. Pt denies SOB and wheezing.     Prediabetes- Pt decreases carbohydrates. Pt walks 2 miles 2 times per week. Gout started 2/10/20. Pt is taking colchicine. Pt has gout left foot. Pt states he needs a referral to Surgery for his hernia. Pt sees Cardiology at [facility] for chronic systolic heart failure. Review of Systems   Constitutional: Negative for activity change, appetite change, chills, fatigue, fever and unexpected weight change. HENT: Negative for congestion, postnasal drip, rhinorrhea, sore throat and trouble swallowing. Eyes: Negative for visual disturbance. Respiratory: Negative for cough, chest tightness, shortness of breath and wheezing. Cardiovascular: Negative for chest pain, palpitations and leg swelling. Gastrointestinal: Negative for abdominal pain, constipation, diarrhea, nausea and vomiting.    Endocrine: Negative for cold intolerance and heat improved. For eczema. 80 g 2    nicotine (NICODERM CQ) 21 MG/24HR Place 1 patch onto the skin every 24 hours      Multiple Vitamins-Minerals (CENTRUM SILVER ADULT 50+ PO) Take 1 tablet by mouth daily      aspirin 81 MG tablet Take 1 tablet by mouth daily. 30 tablet 5    albuterol (PROAIR HFA) 108 (90 BASE) MCG/ACT inhaler Inhale 2 puffs into the lungs every 6 hours as needed. Vitals:    02/13/20 1150 02/13/20 1155   BP: (!) 160/82 (!) 160/82   Pulse: 86    Resp: 16    SpO2: 97%    Weight: 191 lb 9.6 oz (86.9 kg)    Height: 5' 7\" (1.702 m)      Body mass index is 30.01 kg/m². Physical Exam  Nursing note reviewed. Constitutional:       General: He is not in acute distress. Appearance: Normal appearance. He is well-developed. HENT:      Mouth/Throat:      Pharynx: Oropharynx is clear. Eyes:      General: Lids are normal.      Extraocular Movements: Extraocular movements intact. Conjunctiva/sclera: Conjunctivae normal.      Pupils: Pupils are equal, round, and reactive to light. Neck:      Musculoskeletal: Neck supple. Thyroid: No thyromegaly. Vascular: No carotid bruit. Cardiovascular:      Rate and Rhythm: Normal rate and regular rhythm. Heart sounds: Normal heart sounds, S1 normal and S2 normal. No murmur. No friction rub. No gallop. Pulmonary:      Effort: Pulmonary effort is normal. No respiratory distress. Breath sounds: Normal breath sounds. No wheezing, rhonchi or rales. Abdominal:      General: Bowel sounds are normal. There is no distension. Palpations: Abdomen is soft. Tenderness: There is no abdominal tenderness. Hernia: A hernia is present. Hernia is present in the umbilical area. There is no hernia in the ventral area. Musculoskeletal:      Right lower leg: No edema. Left lower leg: No edema. Lymphadenopathy:      Head:      Right side of head: No submandibular adenopathy.       Left side of head: No submandibular adenopathy. Psychiatric:         Mood and Affect: Mood normal.           No results found for this visit on 02/13/20.   Lab Review   Orders Only on 02/07/2020   Component Date Value    BNP 02/07/2020 88    Orders Only on 02/07/2020   Component Date Value    BASIC METABOLIC PANEL 64/30/9039 NA     Sodium 02/07/2020 141     Potassium 02/07/2020 4.5     Chloride 02/07/2020 102     CO2 02/07/2020 33.1*    Anion Gap 02/07/2020 6*    POC Glucose 02/07/2020 104     BUN 02/07/2020 23*    CREATININE 02/07/2020 1.31*    Calcium 02/07/2020 9.6     Age Time 02/07/2020 69     GFR Non- 02/07/2020 54     GFR  02/07/2020 66    Office Visit on 11/13/2019   Component Date Value    Drugs Expected 11/13/2019 See Interp     Pain Management Drug Pan* 11/13/2019 See Note     Creatinine, Ur 11/13/2019 64.3     Codeine 11/13/2019 Not Detected     Morphine 11/13/2019 Not Detected     6-Acetylmorphine 11/13/2019 Not Detected     Oxycodone 11/13/2019 Not Detected     Noroxycodone 11/13/2019 Not Detected     Oxymorphone 11/13/2019 Not Detected     NOROXYMORPHONE, URINE 11/13/2019 Not Detected     Hydrocodone 11/13/2019 Not Detected     NORHYDROCODONE, URINE 11/13/2019 Not Detected     Hydromorphone 11/13/2019 Not Detected     Buprenorphine 11/13/2019 Not Detected     Norbuprenorphine 11/13/2019 Not Detected     Fentanyl 11/13/2019 Not Detected     Norfentanyl 11/13/2019 Not Detected     Meperidine 11/13/2019 Not Detected     Tapentadol, Urine 11/13/2019 Not Detected     Tapentadol-O-Sulfate, Ur* 11/13/2019 Not Detected     Methadone 11/13/2019 Not Detected     Propoxyphene 11/13/2019 Not Detected     Tramadol 11/13/2019 Not Detected     Amphetamine 11/13/2019 Not Detected     Methamphetamine 11/13/2019 Not Detected     MDMA, Urine 11/13/2019 Not Detected     MDA 11/13/2019 Not Detected     MDEA 11/13/2019 Not Detected     Methylphenidate 11/13/2019 Not Detected     Phentermine 11/13/2019 Not Detected     Benzoylecgonine 11/13/2019 Not Detected     Alprazolam 11/13/2019 Present     Alpha-OH-alprazolam 11/13/2019 Present     Clonazepam 11/13/2019 Not Detected     7-aminoclonazepam 11/13/2019 Not Detected     Diazepam 11/13/2019 Not Detected     NORDIAZEPAM 11/13/2019 Not Detected     OXAZEPAM 11/13/2019 Not Detected     TEMAZEPAM 11/13/2019 Not Detected     Lorazepam 11/13/2019 Not Detected     Midazolam 11/13/2019 Not Detected     Zolpidem 11/13/2019 Not Detected     Barbiturates 11/13/2019 Not Detected     Ethyl Glucuronide 11/13/2019 Present     Marijuana Metabolite 11/13/2019 Not Detected     PCP 11/13/2019 Not Detected     CARISOPRODOL 11/13/2019 Not Detected     Pain Management Drug Pan* 11/13/2019 See Below     EER Pain Mgt Drug Panel,* 11/13/2019 See Note     COMPREHENSIVE METABOLIC * 54/75/9046 NA     Sodium 11/22/2019 141     Potassium 11/22/2019 4.4     Chloride 11/22/2019 104     CO2 11/22/2019 28.7     Anion Gap 11/22/2019 8     POC Glucose 11/22/2019 87     BUN 11/22/2019 24*    CREATININE 11/22/2019 1.46*    Calcium 11/22/2019 9.0     Alb 11/22/2019 4.0     Total Protein 11/22/2019 7.0     Total Bilirubin 11/22/2019 0.49     Alkaline Phosphatase 11/22/2019 94     AST 11/22/2019 14*    ALT 11/22/2019 19*    Age Time 11/22/2019 69     GFR Non- 11/22/2019 48     GFR  11/22/2019 58     LIPID PANEL 11/22/2019 NA     Cholesterol, Total 11/22/2019 203*    Triglycerides 11/22/2019 127     HDL 11/22/2019 58     LDL Direct 11/22/2019 120     VLDL 11/22/2019 25     BNP 11/22/2019 143    Orders Only on 09/02/2019   Component Date Value    BASIC METABOLIC PANEL 20/91/0216 NA     Sodium 09/02/2019 142     Potassium 09/02/2019 4.2     Chloride 09/02/2019 104     CO2 09/02/2019 30.5     Anion Gap 09/02/2019 8     POC Glucose 09/02/2019 94     BUN 09/02/2019 30*    CREATININE 09/02/2019 1.28    

## 2020-02-19 ASSESSMENT — ENCOUNTER SYMPTOMS
TROUBLE SWALLOWING: 0
WHEEZING: 0
SORE THROAT: 0
SHORTNESS OF BREATH: 0
DIARRHEA: 0
CONSTIPATION: 0
CHEST TIGHTNESS: 0
COUGH: 0
NAUSEA: 0
ABDOMINAL PAIN: 0
VOMITING: 0
RHINORRHEA: 0

## 2020-02-25 ENCOUNTER — OFFICE VISIT (OUTPATIENT)
Dept: DERMATOLOGY | Age: 70
End: 2020-02-25
Payer: MEDICARE

## 2020-02-25 PROCEDURE — 99213 OFFICE O/P EST LOW 20 MIN: CPT | Performed by: DERMATOLOGY

## 2020-02-25 RX ORDER — CLOBETASOL PROPIONATE 0.5 MG/G
CREAM TOPICAL
Qty: 60 G | Refills: 2 | Status: SHIPPED | OUTPATIENT
Start: 2020-02-25 | End: 2021-03-04

## 2020-02-25 NOTE — PATIENT INSTRUCTIONS
For your well being, we encourage you to stop smoking or using tobacco products. Smoking and the use of other tobacco products, including cigars and smokeless tobacco, causes or worsens numerous diseases and conditions. Some products also expose nearby people to toxic secondhand smoke. Smoking is the leading cause of preventable death in the [place] causing over 438,000 deaths per year. Secondhand smoke is a serious health hazard for people of all ages, causing more than 41,000 deaths each year. Marijuana smoke contains many of the same toxins, irritants and carcinogens as tobacco smoke. Electronic cigarettes are a new tobacco product, and the potential health consequences and safety of these products are unknown. Smokeless Tobacco products are a known cause of cancer, and are not a safe alternative to cigarettes. 1. Make the decision to quit smoking  Stopping smoking is the best thing you can do for your health. If you smoke, you are more likely to get diseases of the lungs, heart, and brain. You are also more likely to get many kinds of cancer. After you quit, you will be less likely to get these diseases. Stopping smoking is hard--but you can do it! Your doctor can help you. 2. Get ready to quit  Once you decide to quit, make a plan with the help of your doctor. Here are some things you need to do:  Choose a day to quit. Talk to your primary care doctor about using the nicotine patch, gum, inhaler, or nasal spray to help you quit smoking. Getting nicotine some way other than in a cigarette can help make quitting easier. Talk to your primary care doctor about using a prescription medicine like bupropion (brand name: Zyban) to reduce your urge to smoke. If you decide to use a medicine, start taking it two weeks before your quit day. Talk with your primary care doctor about when you smoke. For example, you may smoke first thing in the morning, after a meal, or when you feel stressed.  Plan what you will do instead of smoking. Tell your family and friends that you are going to try to quit and on what date. Put together a list of phone numbers of friends and family members who can give you support when you feel you might break down and have a cigarette. Before your quit day, put all tobacco products, ashtrays, and lighters away. 3. Put your plan into action  When you wake up on your quit day, start using a nicotine replacement method if you had planned to do that. For the first few days after you quit, you may have some signs of nicotine withdrawal. You may feel restless and cranky. You may find it hard to think. You might need to change your dose of nicotine if these signs upset you. Do not smoke! If you feel like you want to smoke, call a friend or family member who has agreed to help you. Also, there might be someone in your doctor's office you can call. Put into action your plans for doing things other than smoking. For example, when you feel the urge to smoke, you might take a walk. Or, you might visit friends who do not smoke. It is best to stay away from places where you used to smoke. 4. If you return to smoking--  Quitting smoking is not easy. Many people have to try several times before they succeed. If you start smoking again, call your primary care doctor's office soon to talk about what happened. Think about what you can do to keep from smoking when you try to quit again. Part of this handout is provided by the [employer]. More information is available at the American Lung Association https://echavarria.com/.  This information provides a general overview and may not apply to everyone. Talk to your primary care doctor to find out if this information applies to you and to get more information on this subject.

## 2020-03-02 RX ORDER — ALPRAZOLAM 1 MG/1
TABLET ORAL
Qty: 45 TABLET | Refills: 2 | Status: SHIPPED | OUTPATIENT
Start: 2020-03-02 | End: 2020-06-08

## 2020-03-23 RX ORDER — ATORVASTATIN CALCIUM 40 MG/1
TABLET, FILM COATED ORAL
Qty: 30 TABLET | Refills: 3 | Status: SHIPPED | OUTPATIENT
Start: 2020-03-23 | End: 2020-05-14 | Stop reason: SDUPTHER

## 2020-05-14 ENCOUNTER — VIRTUAL VISIT (OUTPATIENT)
Dept: PRIMARY CARE CLINIC | Age: 70
End: 2020-05-14
Payer: MEDICARE

## 2020-05-14 PROCEDURE — 99214 OFFICE O/P EST MOD 30 MIN: CPT | Performed by: INTERNAL MEDICINE

## 2020-05-14 RX ORDER — ATORVASTATIN CALCIUM 40 MG/1
TABLET, FILM COATED ORAL
Qty: 30 TABLET | Refills: 5 | Status: SHIPPED | OUTPATIENT
Start: 2020-05-14 | End: 2020-11-27 | Stop reason: SDUPTHER

## 2020-05-14 RX ORDER — LEVOTHYROXINE SODIUM 0.1 MG/1
TABLET ORAL
Qty: 30 TABLET | Refills: 5 | Status: SHIPPED | OUTPATIENT
Start: 2020-05-14 | End: 2020-10-11

## 2020-05-14 SDOH — ECONOMIC STABILITY: INCOME INSECURITY: HOW HARD IS IT FOR YOU TO PAY FOR THE VERY BASICS LIKE FOOD, HOUSING, MEDICAL CARE, AND HEATING?: NOT HARD AT ALL

## 2020-05-14 SDOH — ECONOMIC STABILITY: TRANSPORTATION INSECURITY
IN THE PAST 12 MONTHS, HAS THE LACK OF TRANSPORTATION KEPT YOU FROM MEDICAL APPOINTMENTS OR FROM GETTING MEDICATIONS?: NO

## 2020-05-14 SDOH — ECONOMIC STABILITY: FOOD INSECURITY: WITHIN THE PAST 12 MONTHS, YOU WORRIED THAT YOUR FOOD WOULD RUN OUT BEFORE YOU GOT MONEY TO BUY MORE.: NEVER TRUE

## 2020-05-14 SDOH — ECONOMIC STABILITY: TRANSPORTATION INSECURITY
IN THE PAST 12 MONTHS, HAS LACK OF TRANSPORTATION KEPT YOU FROM MEETINGS, WORK, OR FROM GETTING THINGS NEEDED FOR DAILY LIVING?: NO

## 2020-05-14 SDOH — ECONOMIC STABILITY: FOOD INSECURITY: WITHIN THE PAST 12 MONTHS, THE FOOD YOU BOUGHT JUST DIDN'T LAST AND YOU DIDN'T HAVE MONEY TO GET MORE.: NEVER TRUE

## 2020-05-19 ASSESSMENT — ENCOUNTER SYMPTOMS
VOMITING: 0
SORE THROAT: 0
DIARRHEA: 0
ABDOMINAL PAIN: 0
CONSTIPATION: 0
TROUBLE SWALLOWING: 0
WHEEZING: 0
SHORTNESS OF BREATH: 0
COUGH: 0
CHEST TIGHTNESS: 0
RHINORRHEA: 0
NAUSEA: 0

## 2020-05-26 RX ORDER — BUDESONIDE AND FORMOTEROL FUMARATE DIHYDRATE 160; 4.5 UG/1; UG/1
AEROSOL RESPIRATORY (INHALATION)
Qty: 10.2 INHALER | Refills: 3 | Status: SHIPPED | OUTPATIENT
Start: 2020-05-26 | End: 2020-06-26 | Stop reason: SDUPTHER

## 2020-06-05 ENCOUNTER — TELEPHONE (OUTPATIENT)
Dept: DERMATOLOGY | Age: 70
End: 2020-06-05

## 2020-06-08 RX ORDER — ALPRAZOLAM 1 MG/1
TABLET ORAL
Qty: 45 TABLET | Refills: 2 | Status: SHIPPED | OUTPATIENT
Start: 2020-06-08 | End: 2020-09-17

## 2020-06-25 NOTE — TELEPHONE ENCOUNTER
[facility] received a call from:    Name of Caller: Patient    Best contact number: [phone]    Reason for call: Patient called in requesting Dr. Haim Linares send in a generic version of Symbicort because the brand name is no longer covered under insurance. Patient is completely out of medication Please advise. [facility]/pharmacy #[id number]- [place], [place] - [address] - P [phone] - F [phone]

## 2020-06-26 RX ORDER — BUDESONIDE AND FORMOTEROL FUMARATE DIHYDRATE 160; 4.5 UG/1; UG/1
2 AEROSOL RESPIRATORY (INHALATION) 2 TIMES DAILY
Qty: 10.2 INHALER | Refills: 3 | Status: SHIPPED | OUTPATIENT
Start: 2020-06-26 | End: 2021-03-01

## 2020-08-05 ENCOUNTER — OFFICE VISIT (OUTPATIENT)
Dept: DERMATOLOGY | Age: 70
End: 2020-08-05
Payer: MEDICARE

## 2020-08-05 VITALS — TEMPERATURE: 97.9 F

## 2020-08-05 PROCEDURE — 11104 PUNCH BX SKIN SINGLE LESION: CPT | Performed by: DERMATOLOGY

## 2020-08-05 NOTE — PROGRESS NOTES
Formerly [facility]  Alondra Casper MD  [address]  1950    71 y.o. male     Date of Visit: 8/5/2020    Chief Complaint: dermatitis    History of Present Illness:    1. He returns today to follow-up for history of chronic dermatitis (prior biopsies revealed mild spongiotic dermatitis with scattered eosinophils). Flaring this summer on R thigh. Not particularly itchy. Has been using clobetasol cream without clearance of the rash. Review of Systems:  Gen: Feels well, good sense of health. Past Medical History, Family History, Surgical History, Medications and Allergies reviewed. Past Medical History:   Diagnosis Date    Allergic rhinitis     Anxiety     Asthma     COPD (chronic obstructive pulmonary disease) (HCC)     Hyperlipidemia     Hypertension     Hypothyroidism     Soft tissue sarcoma (HCC)      Past Surgical History:   Procedure Laterality Date    COLONOSCOPY  8./16/2007    BN - 10 year f/u    SINUS SURGERY      SKIN CANCER EXCISION         Allergies   Allergen Reactions    Latex Rash    Contrast [Gadolinium Derivatives] Shortness Of Breath and Anxiety    Soap Rash     Outpatient Medications Marked as Taking for the 8/5/20 encounter (Office Visit) with Harrison Simpson MD   Medication Sig Dispense Refill    budesonide-formoterol (SYMBICORT) 160-4.5 MCG/ACT AERO Inhale 2 puffs into the lungs 2 times daily 10.2 Inhaler 3    ALPRAZolam (XANAX) 1 MG tablet TAKE 1/2 TABLET BY MOUTH ONCE DAILY AND 1 TABLET NIGHTLY. 45 tablet 2    atorvastatin (LIPITOR) 40 MG tablet TAKE 1 TABLET BY MOUTH EVERY DAY AT NIGHT 30 tablet 5    levothyroxine (SYNTHROID) 100 MCG tablet TAKE 1 TABLET BY MOUTH EVERY DAY 30 tablet 5    clobetasol (TEMOVATE) 0.05 % cream Apply to affected areas on the abdomen and thighs twice daily for up to 2 weeks or until improved.  60 g 2    colchicine (COLCRYS) 0.6 MG tablet Take 1 tablet by mouth daily      carvedilol (COREG) pathology. The specimen bottles were appropriately labeled. We also reviewed the risks of bleeding, scar, and infection.

## 2020-08-10 LAB — DERMATOLOGY PATHOLOGY REPORT: NORMAL

## 2020-08-14 ENCOUNTER — OFFICE VISIT (OUTPATIENT)
Dept: PRIMARY CARE CLINIC | Age: 70
End: 2020-08-14
Payer: MEDICARE

## 2020-08-14 VITALS
TEMPERATURE: 97 F | BODY MASS INDEX: 28.25 KG/M2 | OXYGEN SATURATION: 97 % | HEART RATE: 82 BPM | RESPIRATION RATE: 16 BRPM | WEIGHT: 180 LBS | HEIGHT: 67 IN | SYSTOLIC BLOOD PRESSURE: 122 MMHG | DIASTOLIC BLOOD PRESSURE: 72 MMHG

## 2020-08-14 PROCEDURE — 99214 OFFICE O/P EST MOD 30 MIN: CPT | Performed by: INTERNAL MEDICINE

## 2020-08-14 NOTE — PATIENT INSTRUCTIONS
-Continue same medications  -Low sodium diet  -Low fat, low cholesterol diet  -low carbohydrate diet  -Regular aerobic exercise  -Fast 8-10 hours for labs

## 2020-08-14 NOTE — LETTER
reduced coughing, which are especially dangerous for patients with lung disease. Overdose or dangerous interactions with alcohol and other medications may occur, leading to death. Hyperalgesia may develop, in which patients receiving opioids for the treatment of pain may actually become more sensitive to certain painful stimuli, and in some cases, experience pain from ordinarily non-painful stimuli. Women between the ages of 14-53 who could become pregnant should carefully weigh the risks and benefits of opioids with their physicians, as these medications increase the risk of pregnancy complications, including miscarriage,  delivery and stillbirth. It is also possible for babies to be born addicted to opioids. Opioid dependence withdrawal symptoms may include; feelings of uneasiness, increased pain, irritability, belly pain, diarrhea, sweats and goose-flesh. Benzodiazepines and non-benzodiazepine sleep medications: These medications can lead to problems such as addiction/dependence, sedation, fatigue, lightheadedness, dizziness, incoordination, falls, depression, hallucinations, and impaired judgment, memory and concentration. The ability to drive and operate machinery may also be affected. Abnormal sleep-related behaviors have been reported, including sleep walking, driving, making telephone calls, eating, or having sex while not fully awake. These medications can suppress breathing and worsen sleep apnea, particularly when combined with alcohol or other sedating medications, potentially leading to death. Dependence withdrawal symptoms may include tremors, anxiety, hallucinations and seizures. Stimulants:  Common adverse effects include addiction/dependence, increased blood pressure and heart rate, decreased appetite, nausea, involuntary weight loss, insomnia, irritability, and headaches.   These risks may increase when these medications are combined with other stimulants, such as caffeine pills or energy drinks, certain weight loss supplements and oral decongestants. Dependence withdrawal symptoms may include depressed mood, loss of interest, suicidal thoughts, anxiety, fatigue, appetite changes and agitation. Testosterone replacement therapy:  Potential side effects include increased risk of stroke and heart attack, blood clots, increased blood pressure, increased cholesterol, enlarged prostate, sleep apnea, irritability/aggression and other mood disorders, and decreased fertility. Other:     1. I understand that I have the following responsibilities:  · I will take medications at the dose and frequency prescribed. · I will not increase or change how I take my medications without the approval of the health care provider who signs this Medication Agreement. · I will arrange for refills at the prescribed interval ONLY during regular office hours. I will not ask for refills earlier than agreed, after-hours, on holidays or on weekends. · I will obtain all refills for these medications at  ·  ____[facility]/pharmacy #[id number]- [place], [place] - [address] [phone] - F [phone] ________________________________  pharmacy (phone number  ·  ________________________), with full consent for my provider and pharmacist to exchange information in writing or verbally. · I will not request any pain medications or controlled substances from other providers and will inform this provider of all other medications I am taking. · I will inform my other health care providers that I am taking these medications and of the existence of this [facility]. In the event of an emergency, I will provide the same information to the emergency department providers. · I will protect my prescriptions and medications. I understand that lost or misplaced prescriptions will not be replaced.   · I will keep medications only for my own use and will not share them with others. I will keep all medications away from children. · I agree to participate in any medical, psychological or psychiatric assessments recommended by my provider. · I will actively participate in any program designed to improve function, including social, physical, psychological and daily or work activities. 2. I will not use illegal or street drugs or another person's prescription. If I have an addiction problem with drugs or alcohol and my provider asks me to enter a program to address this issue, I agree to follow through. Such programs may include:  · 12-Step program and securing a sponsor  · Individual counseling   · Inpatient or outpatient treatment  · Other:_____________________________________________________________________________________________________________________________________________    If in treatment, I will request that a copy of the programs initial evaluation and treatment recommendations be sent to this provider and will not expect refills until that is received. I will also request written monthly updates be sent to this provider to verify my continuing treatment. 3. I will consent to drug screening upon my providers request to assure I am only taking the prescribed drugs, described in this MEDICATION AGREEMENT. I understand that a drug screen is a laboratory test in which a sample of my urine, blood or saliva is checked to see what drugs I have been taking. 4. I agree that I will treat the providers and staff at this office with respect at all times. I will keep all of my scheduled appointments, but if I need to cancel my appointment, I will do so a minimum of 24 hours before it is scheduled. 5. I understand that this provider may stop prescribing the medications listed if:  · I do not show any improvement in pain, or my activity has not improved. · I develop rapid tolerance or loss of improvement, as described in my treatment plan. · I develop significant side effects from the medication. · My behavior is inconsistent with the responsibilities outlined above, which may also result in my being prevented from receiving further care from this office. · Other:____________________________________________________________________    AGREEMENT:    I have read the above and have had all of my questions answered. For chronic disease management, I know that my symptoms can be managed with many types of treatments. A chronic medication trial may be part of my treatment, but I must be an active participant in my care. Medication therapy is only one part of my symptom management plan. In some cases, there may be limited scientific evidence to support the chronic use of certain medications to improve symptoms and daily function. Furthermore, in certain circumstances, there may be scientific information that suggests that use of chronic controlled substances may actually worsen my symptoms and increase my risk of unintentional death directly related to this medication therapy. I know that if my provider feels my risk from controlled medications is greater than my benefit, I will have my controlled substance medication(s) compassionately lowered or removed altogether. I agree to a controlled substance medication trial.      I further agree to allow this office to contact my HIPAA contact on file if there are concerns about my safety and use of controlled medications. I have agreed to use the following medications above as instructed by my physician and as stated in this [place] [address].      Patient Signature:  ______________________  Date:8/14/2020 or _____________    Provider Signature:__  ____________________  Date:8/14/2020 or _____________

## 2020-08-14 NOTE — PROGRESS NOTES
Shama Mcnair   Date ofBirth:  1950    Date of Visit:  8/14/2020    Chief Complaint   Patient presents with    Hyperlipidemia    Hypertension    Anxiety    Thyroid Problem    COPD    Other     Prediabetes       HPI  Hypertension-Pt takes Losartan 25mg po q day and Carvedilol 6.25mg po bid. Pt doesn't monitor his BP. Pt decreases salt. Pt bikes outdoor 2-4 hours every other day.     Hyperlipidemia- Pt takes Atorvastatin 40mg po qhs. Pt states he hasn't been decreasing fat and cholesterol as much.      Anxiety- Pt takes Xanax 1mg 1/2 tablet po q evening and 1 tablet po qhs. Pt has anxiety. Pt denies recent panic attacks.     Hypothyroidism- Pt takes Levothyroxine 100mcg po q day. Pt denies fatigue, weight gain, constipation, and cold intolerance. Pt states he has been losing weight. Pt states he is eating better. Pt is eating salads and protein. Pt bikes outdoor 2-4 hours every other day.     COPD- Pt uses Symbicort inhaler 160-4.5mcg 2 puffs bid. Pt uses ProAir HFA inhaler as needed. Pt denies SOB and wheezing.     Prediabetes- Pt decreases carbohydrates. Pt bikes outdoor 2-4 hours every other day.     Gout - Pt denies recent gout. Pt takes Colchicine prn.      Chronic kidney disease- Pt avoids NSAID's. Wt Readings from Last 3 Encounters:   08/14/20 180 lb (81.6 kg)   02/13/20 191 lb 9.6 oz (86.9 kg)   12/23/19 187 lb (84.8 kg)        Review of Systems   Constitutional: Positive for unexpected weight change. Negative for activity change, appetite change, chills, fatigue and fever. HENT: Negative for congestion, postnasal drip, rhinorrhea, sore throat and trouble swallowing. Eyes: Negative for visual disturbance. Respiratory: Negative for cough, chest tightness, shortness of breath and wheezing. Cardiovascular: Negative for chest pain, palpitations and leg swelling. Gastrointestinal: Negative for abdominal pain, constipation, diarrhea, nausea and vomiting.    Endocrine: Negative for cold intolerance and heat intolerance. Genitourinary: Negative for dysuria, frequency and hematuria. Musculoskeletal: Negative for arthralgias and myalgias. Neurological: Negative for dizziness, syncope, light-headedness, numbness and headaches. Psychiatric/Behavioral: Negative for decreased concentration, dysphoric mood and sleep disturbance. The patient is nervous/anxious. Allergies   Allergen Reactions    Latex Rash    Contrast [Gadolinium Derivatives] Shortness Of Breath and Anxiety    Soap Rash     Outpatient Medications Marked as Taking for the 8/14/20 encounter (Office Visit) with Khushbu Seay MD   Medication Sig Dispense Refill    budesonide-formoterol (SYMBICORT) 160-4.5 MCG/ACT AERO Inhale 2 puffs into the lungs 2 times daily 10.2 Inhaler 3    ALPRAZolam (XANAX) 1 MG tablet TAKE 1/2 TABLET BY MOUTH ONCE DAILY AND 1 TABLET NIGHTLY. 45 tablet 2    atorvastatin (LIPITOR) 40 MG tablet TAKE 1 TABLET BY MOUTH EVERY DAY AT NIGHT 30 tablet 5    levothyroxine (SYNTHROID) 100 MCG tablet TAKE 1 TABLET BY MOUTH EVERY DAY 30 tablet 5    colchicine (COLCRYS) 0.6 MG tablet Take 1 tablet by mouth daily PRN      carvedilol (COREG) 6.25 MG tablet Take 1 tablet by mouth 2 times daily (with meals)      torsemide (DEMADEX) 20 MG tablet Take 2 tablets by mouth daily      losartan (COZAAR) 25 MG tablet Take 1 tablet by mouth daily      spironolactone (ALDACTONE) 25 MG tablet Take 0.5 tablets by mouth daily      potassium chloride (KLOR-CON M) 10 MEQ extended release tablet Take 1 tablet by mouth daily 30 tablet 3    nicotine (NICODERM CQ) 21 MG/24HR Place 1 patch onto the skin every 24 hours PRN      aspirin 81 MG tablet Take 1 tablet by mouth daily. 30 tablet 5    albuterol (PROAIR HFA) 108 (90 BASE) MCG/ACT inhaler Inhale 2 puffs into the lungs every 6 hours as needed.              Vitals:    08/14/20 1012   BP: 122/72   Pulse: 82   Resp: 16   Temp: 97 °F (36.1 °C)   SpO2: 97%   Weight: 180 lb (81.6 kg)   Height: 5' 7\" (1.702 m)     Body mass index is 28.19 kg/m². Physical Exam  Nursing note reviewed. Constitutional:       General: He is not in acute distress. Appearance: Normal appearance. He is well-developed. HENT:      Mouth/Throat:      Pharynx: Oropharynx is clear. Eyes:      General: Lids are normal.      Extraocular Movements: Extraocular movements intact. Conjunctiva/sclera: Conjunctivae normal.      Pupils: Pupils are equal, round, and reactive to light. Neck:      Musculoskeletal: Neck supple. Thyroid: No thyromegaly. Vascular: No carotid bruit. Cardiovascular:      Rate and Rhythm: Normal rate and regular rhythm. Heart sounds: Normal heart sounds, S1 normal and S2 normal. No murmur. No friction rub. No gallop. Pulmonary:      Effort: Pulmonary effort is normal. No respiratory distress. Breath sounds: Normal breath sounds. No wheezing, rhonchi or rales. Abdominal:      General: Bowel sounds are normal. There is no distension. Palpations: Abdomen is soft. Tenderness: There is no abdominal tenderness. Lymphadenopathy:      Head:      Right side of head: No submandibular adenopathy. Left side of head: No submandibular adenopathy. Psychiatric:         Mood and Affect: Mood normal.           No results found for this visit on 08/14/20.   Lab Review   Orders Only on 08/06/2020   Component Date Value    BNP 08/06/2020 124    Orders Only on 08/06/2020   Component Date Value    BASIC METABOLIC PANEL 85/21/8682 NA     Sodium 08/06/2020 140     Potassium 08/06/2020 4.1     Chloride 08/06/2020 102     CO2 08/06/2020 30.1     Anion Gap 08/06/2020 8     POC Glucose 08/06/2020 92     BUN 08/06/2020 25*    CREATININE 08/06/2020 1.31*    Calcium 08/06/2020 9.3     Age Time 08/06/2020 69     GFR Non- 08/06/2020 54     GFR  08/06/2020 66    Office Visit on 08/05/2020   Component Date Value    Dermatology Pathology Re* 08/05/2020 SEE COMMENTS          Assessment/Plan     1. Essential hypertension  -stable  -Continue same medications  -Low sodium diet  -Regular aerobic exercise    2. Generalized anxiety disorder  -stable  -Continue same medications    3. Mixed hyperlipidemia  -Continue same medications  -Low fat, low cholesterol diet  -Regular aerobic exercise    4. Acquired hypothyroidism  -stable  -Continue same medications    5. Prediabetes  -Low carbohydrate diet  -Regular aerobic exercise    6. Chronic obstructive pulmonary disease, unspecified COPD type ([facility] [place] [address]  -stable  -Continue same medications    7. Stage 3 chronic kidney disease (HCC)  -stable  -Avoid NSAID's such as otc Ibuprofen, Advil, Motrin, Naprosyn, and Aleve as well as prescription NSAID's    8. Chronic systolic heart failure (HCC)  -stable  -Continue same medications  -Continue care per Cardiology    9. Idiopathic gout of foot, unspecified chronicity, unspecified laterality  -stable  -no recent gout attacks      Discussed medications with patient, who voiced understanding of their use and indications. All questions answered. Return in about 3 months (around 11/14/2020) for anxiety and COPD.

## 2020-08-22 PROBLEM — M10.079 IDIOPATHIC GOUT OF FOOT: Status: ACTIVE | Noted: 2020-08-22

## 2020-08-22 ASSESSMENT — ENCOUNTER SYMPTOMS
DIARRHEA: 0
COUGH: 0
SORE THROAT: 0
RHINORRHEA: 0
NAUSEA: 0
SHORTNESS OF BREATH: 0
VOMITING: 0
TROUBLE SWALLOWING: 0
ABDOMINAL PAIN: 0
CONSTIPATION: 0
WHEEZING: 0
CHEST TIGHTNESS: 0

## 2020-08-28 ENCOUNTER — OFFICE VISIT (OUTPATIENT)
Dept: DERMATOLOGY | Age: 70
End: 2020-08-28
Payer: MEDICARE

## 2020-08-28 VITALS — TEMPERATURE: 98.4 F

## 2020-08-28 PROCEDURE — 11900 INJECT SKIN LESIONS </W 7: CPT | Performed by: DERMATOLOGY

## 2020-08-28 NOTE — PROGRESS NOTES
[facility]  Salima Yun MD  [address]  1950    71 y.o. male     Date of Visit: 8/28/2020    Chief Complaint: dermatitis    History of Present Illness:    Eugene Pop returns today to follow-up for a chronic pruritic eruption on the thighs. It had not cleared with use of potent topical steroids. Follow-up biopsies did reveal dermatitis. RESULTS   DIAGNOSIS   DIAGNOSIS:   Right lateral thigh-   Chronic dermatitis, PAS Stain is negative   There are foci of ortho and parakeratosis over an   acanthotic epidermis.  The papillary dermis is thickened   and contains a moderately dense lymphohistiocytic   infiltrate.  The etiology of the dermatitis cannot be   determined on a histologic basis.  The PAS stain for fungus   is negative. All controls stained appropriately. COMMENT: Multiple deeper cuts do not show characteristic   features of cutaneous T-cell lymphoma. However, multiple   repeat biopsies are recommended if cutaneous T-cell   lymphoma remains a clinical concern. Rolande Nissen, MD   Electronic Signature: 10 AUG 2020 09:15 AM       Review of Systems:  None. Past Medical History, Family History, Surgical History, Medications and Allergies reviewed.     Past Medical History:   Diagnosis Date    Allergic rhinitis     Anxiety     Asthma     COPD (chronic obstructive pulmonary disease) ([facility] [address]     Hyperlipidemia     Hypertension     Hypothyroidism     Soft tissue sarcoma (HCC)      Past Surgical History:   Procedure Laterality Date    COLONOSCOPY  8./16/2007    BN - 10 year f/u    SINUS SURGERY      SKIN CANCER EXCISION         Allergies   Allergen Reactions    Latex Rash    Contrast [Gadolinium Derivatives] Shortness Of Breath and Anxiety    Soap Rash     Outpatient Medications Marked as Taking for the 8/28/20 encounter (Office Visit) with Carmen Ames MD   Medication Sig Dispense Refill    budesonide-formoterol (SYMBICORT) 160-4.5 MCG/ACT AERO Inhale 2 puffs into the lungs 2 times daily 10.2 Inhaler 3    ALPRAZolam (XANAX) 1 MG tablet TAKE 1/2 TABLET BY MOUTH ONCE DAILY AND 1 TABLET NIGHTLY. 45 tablet 2    atorvastatin (LIPITOR) 40 MG tablet TAKE 1 TABLET BY MOUTH EVERY DAY AT NIGHT 30 tablet 5    levothyroxine (SYNTHROID) 100 MCG tablet TAKE 1 TABLET BY MOUTH EVERY DAY 30 tablet 5    colchicine (COLCRYS) 0.6 MG tablet Take 1 tablet by mouth daily PRN      carvedilol (COREG) 6.25 MG tablet Take 1 tablet by mouth 2 times daily (with meals)      torsemide (DEMADEX) 20 MG tablet Take 2 tablets by mouth daily      losartan (COZAAR) 25 MG tablet Take 1 tablet by mouth daily      spironolactone (ALDACTONE) 25 MG tablet Take 0.5 tablets by mouth daily      potassium chloride (KLOR-CON M) 10 MEQ extended release tablet Take 1 tablet by mouth daily 30 tablet 3    albuterol (PROVENTIL) (2.5 MG/3ML) 0.083% nebulizer solution Take 2.5 mg by nebulization every 6 hours as needed for Wheezing or Shortness of Breath      nicotine (NICODERM CQ) 21 MG/24HR Place 1 patch onto the skin every 24 hours PRN      Multiple Vitamins-Minerals (CENTRUM SILVER ADULT 50+ PO) Take 1 tablet by mouth daily      aspirin 81 MG tablet Take 1 tablet by mouth daily. 30 tablet 5    albuterol (PROAIR HFA) 108 (90 BASE) MCG/ACT inhaler Inhale 2 puffs into the lungs every 6 hours as needed. Physical Examination       Well appearing. 1.  Right lateral thigh - several cm lichenified erythematous plaque. Left thigh - few scaly pink patches. Assessment and Plan     1. 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 Heliotherapy 2 to 3 times per week. Return in about 6 weeks (around 10/9/2020).

## 2020-09-17 RX ORDER — ALPRAZOLAM 1 MG/1
TABLET ORAL
Qty: 45 TABLET | Refills: 1 | Status: SHIPPED | OUTPATIENT
Start: 2020-09-17 | End: 2020-11-30 | Stop reason: SDUPTHER

## 2020-09-17 NOTE — TELEPHONE ENCOUNTER
Controlled Substance Monitoring:    Acute and Chronic Pain Monitoring:   RX Monitoring 9/17/2020   Attestation -   Periodic Controlled Substance Monitoring No signs of potential drug abuse or diversion identified.

## 2020-09-17 NOTE — TELEPHONE ENCOUNTER
Medication:   Requested Prescriptions     Pending Prescriptions Disp Refills    ALPRAZolam (XANAX) 1 MG tablet [Pharmacy Med Name: ALPRAZOLAM 1 MG TABLET] 45 tablet      Sig: TAKE 1/2 TABLET ONCE A DAY AND ONE TABLET AT NIGHT        Last Filled:      Patient Phone Number: [phone] (home)     Last appt: 8/14/2020   Next appt: 11/9/2020    Last OARRS:   RX Monitoring 5/19/2020   Attestation -   Periodic Controlled Substance Monitoring No signs of potential drug abuse or diversion identified. Preferred Pharmacy:   [facility]/pharmacy #[id number]- [place], [place] - [address] [phone] - F [phone]  [address]  [address] [facility]  Phone: [phone] Fax: [phone]    [facility] [address] [address]  Phone: [phone] Fax: [phone]

## 2020-10-10 NOTE — TELEPHONE ENCOUNTER
Medication:   Requested Prescriptions     Pending Prescriptions Disp Refills    levothyroxine (SYNTHROID) 100 MCG tablet [Pharmacy Med Name: LEVOTHYROXINE 100 MCG TABLET] 30 tablet 5     Sig: TAKE 1 TABLET BY MOUTH EVERY DAY     Last Filled: 5.14.20    Last appt: 8/14/2020   Next appt: 11/9/2020    Last OARRS:   RX Monitoring 9/17/2020   Attestation -   Periodic Controlled Substance Monitoring No signs of potential drug abuse or diversion identified.

## 2020-10-11 RX ORDER — LEVOTHYROXINE SODIUM 0.1 MG/1
TABLET ORAL
Qty: 30 TABLET | Refills: 0 | Status: SHIPPED | OUTPATIENT
Start: 2020-10-11 | End: 2020-11-03

## 2020-11-03 RX ORDER — LEVOTHYROXINE SODIUM 0.1 MG/1
TABLET ORAL
Qty: 30 TABLET | Refills: 0 | Status: SHIPPED | OUTPATIENT
Start: 2020-11-03 | End: 2020-12-04

## 2020-11-03 NOTE — TELEPHONE ENCOUNTER
Medication:   Requested Prescriptions     Pending Prescriptions Disp Refills    levothyroxine (SYNTHROID) 100 MCG tablet [Pharmacy Med Name: LEVOTHYROXINE 100 MCG TABLET] 30 tablet 0     Sig: TAKE 1 TABLET BY MOUTH EVERY DAY     Last Filled: 10.11.20    Last appt: 8/14/2020   Next appt: 11/9/2020    Last OARRS:   RX Monitoring 9/17/2020   Attestation -   Periodic Controlled Substance Monitoring No signs of potential drug abuse or diversion identified.

## 2020-11-09 ENCOUNTER — HOSPITAL ENCOUNTER (OUTPATIENT)
Age: 70
Discharge: HOME | End: 2020-11-09
Payer: MEDICARE

## 2020-11-09 ENCOUNTER — OFFICE VISIT (OUTPATIENT)
Dept: PRIMARY CARE CLINIC | Age: 70
End: 2020-11-09
Payer: MEDICARE

## 2020-11-09 VITALS
HEART RATE: 77 BPM | BODY MASS INDEX: 30.23 KG/M2 | DIASTOLIC BLOOD PRESSURE: 84 MMHG | WEIGHT: 193 LBS | SYSTOLIC BLOOD PRESSURE: 138 MMHG | OXYGEN SATURATION: 98 % | TEMPERATURE: 96.8 F | RESPIRATION RATE: 16 BRPM

## 2020-11-09 PROCEDURE — 99214 OFFICE O/P EST MOD 30 MIN: CPT | Performed by: INTERNAL MEDICINE

## 2020-11-09 PROCEDURE — G0008 ADMIN INFLUENZA VIRUS VAC: HCPCS | Performed by: INTERNAL MEDICINE

## 2020-11-09 PROCEDURE — 90694 VACC AIIV4 NO PRSRV 0.5ML IM: CPT | Performed by: INTERNAL MEDICINE

## 2020-11-09 NOTE — PROGRESS NOTES
Vaccine Information Sheet, \"Influenza - Inactivated\"  given to Shelia Cassidy, or parent/legal guardian of  Shelia Cassidy and verbalized understanding. Patient responses:    Have you ever had a reaction to a flu vaccine? No  Do you have any current illness? No  Have you ever had [place]illian [place] Syndrome? No  Do you have a serious allergy to any of the follow: Neomycin, Polymyxin, Thimerosal, eggs or egg products? No    Flu vaccine given per order. Please see immunization tab. Risks and benefits explained. Current VIS given.       Immunizations Administered     Name Date Dose Route    Influenza, Quadv, adjuvanted, 65 yrs +, IM, PF (Fluad) 11/9/2020 0.5 mL Intramuscular    Site: Deltoid- Left    Lot: [id number]    NDC: 71702-689-22

## 2020-11-09 NOTE — PROGRESS NOTES
Ever Whelan   Date ofBirth:  1950    Date of Visit:  11/9/2020    Chief Complaint   Patient presents with    Anxiety    COPD    Knee Pain       HPI  Anxiety- Pt takes Xanax 1mg 1/2 tablet po q evening and 1 tablet po qhs. Pt has anxiety. Pt denies recent panic attacks. Anxiety increased due to Covid. Pt states people in his town do not wear masks. COPD- Pt uses Symbicort inhaler 160-4.5mcg 2 puffs bid. Pt uses ProAir HFA inhaler as needed. Pt denies SOB and wheezing. Pt states he has a little cough productive in the morning with clear phlegm then nonproductive. Left knee pain- Pt has pain with walking sometimes and feels like it is not right. Pain is sharp and intermittent. Pt denies knee swelling. Pt can bike without problem. Pt states if he flexes and bends his knee it hurts.      Review of Systems   Constitutional: Negative for activity change, appetite change, chills, fatigue, fever and unexpected weight change. HENT: Negative for congestion, ear pain, postnasal drip, rhinorrhea, sneezing and sore throat. Eyes: Negative for visual disturbance. Respiratory: Positive for cough. Negative for chest tightness, shortness of breath and wheezing. Cardiovascular: Negative for chest pain, palpitations and leg swelling. Gastrointestinal: Negative for abdominal pain, constipation, diarrhea, nausea and vomiting. Genitourinary: Negative for dysuria, frequency and hematuria. Musculoskeletal: Positive for arthralgias. Negative for gait problem, joint swelling and myalgias. Neurological: Negative for dizziness, syncope, light-headedness, numbness and headaches. Psychiatric/Behavioral: Negative for decreased concentration, dysphoric mood and sleep disturbance. The patient is nervous/anxious.         Allergies   Allergen Reactions    Latex Rash    Contrast [Gadolinium Derivatives] Shortness Of Breath and Anxiety    Soap Rash     Outpatient Medications Marked as Taking for the 11/9/20 equal, round, and reactive to light. Neck:      Musculoskeletal: Neck supple. Thyroid: No thyromegaly. Vascular: No carotid bruit. Cardiovascular:      Rate and Rhythm: Normal rate and regular rhythm. Heart sounds: Normal heart sounds, S1 normal and S2 normal. No murmur. No friction rub. No gallop. Pulmonary:      Effort: Pulmonary effort is normal. No respiratory distress. Breath sounds: Normal breath sounds. No wheezing, rhonchi or rales. Abdominal:      General: Bowel sounds are normal. There is no distension. Palpations: Abdomen is soft. Tenderness: There is no abdominal tenderness. Musculoskeletal:      Left knee: He exhibits no swelling. No tenderness found. Right lower leg: No edema. Left lower leg: No edema. Lymphadenopathy:      Head:      Right side of head: No submandibular adenopathy. Left side of head: No submandibular adenopathy. Neurological:      Gait: Gait normal.   Psychiatric:         Mood and Affect: Mood normal.           No results found for this visit on 11/09/20.   Lab Review   Orders Only on 11/08/2020   Component Date Value    BNP 11/08/2020 163    Orders Only on 11/08/2020   Component Date Value    Thyrotropin Releasing Ho* 11/08/2020 1.28    Orders Only on 11/08/2020   Component Date Value    COMPREHENSIVE METABOLIC * 15/74/4959 NA     Sodium 11/08/2020 140     Potassium 11/08/2020 4.4     Chloride 11/08/2020 101     CO2 11/08/2020 31.2     Anion Gap 11/08/2020 8     POC Glucose 11/08/2020 101     BUN 11/08/2020 25*    CREATININE 11/08/2020 1.48*    Calcium 11/08/2020 9.4     Alb 11/08/2020 4.0     Total Protein 11/08/2020 7.1     Total Bilirubin 11/08/2020 0.86     Alkaline Phosphatase 11/08/2020 91     AST 11/08/2020 11*    ALT 11/08/2020 18*    Age Time 11/08/2020 70     GFR Non- 11/08/2020 47     GFR  11/08/2020 57    Orders Only on 11/08/2020   Component Date Value   

## 2020-11-09 NOTE — PATIENT INSTRUCTIONS
1. Generalized anxiety disorder  -stable  -some increased anxiety with Covid  -Continue same medications    2. Chronic obstructive pulmonary disease, unspecified COPD type ([facility] [address]  -stable  -Continue same medications    3. Left knee pain, unspecified chronicity  - XR KNEE LEFT (3 VIEWS); Future  - diclofenac sodium (VOLTAREN) 1 % GEL; Apply 2 g topically 2 times daily  Dispense: 100 g; Refill: 0  - Referral to SAFIA WATERS MD, Orthopedic Surgery, [facility]    4. Flu vaccine need  - INFLUENZA, QUADV, ADJUVANTED, 65 YRS =, IM, PF, PREFILL SYR, 0.5ML (FLUAD) given    5. Medication management  - Drug Panel-PM-HI Res-UR Interp-A    Patient Education        Influenza (Flu) Vaccine (Inactivated or Recombinant): What You Need to Know  Why get vaccinated? Influenza vaccine can prevent influenza (flu). Flu is a contagious disease that spreads around the [place] [facility] every year, usually between October and May. Anyone can get the flu, but it is more dangerous for some people. Infants and young children, people 72years of age and older, pregnant women, and people with certain health conditions or a weakened immune system are at greatest risk of flu complications. Pneumonia, bronchitis, sinus infections and ear infections are examples of flu-related complications. If you have a medical condition, such as heart disease, cancer or diabetes, flu can make it worse. Flu can cause fever and chills, sore throat, muscle aches, fatigue, cough, headache, and runny or stuffy nose. Some people may have vomiting and diarrhea, though this is more common in children than adults. Each year, thousands of people in the [facility] die from flu, and many more are hospitalized. Flu vaccine prevents millions of illnesses and flu-related visits to the doctor each year. Influenza vaccine  CDC recommends everyone 10months of age and older get vaccinated every flu season.  Children 6 months through 6years of age may need 2 doses vaccine causing a severe allergic reaction, other serious injury, or death. What if there is a serious problem? An allergic reaction could occur after the vaccinated person leaves the clinic. If you see signs of a severe allergic reaction (hives, swelling of the face and throat, difficulty breathing, a fast heartbeat, dizziness, or weakness), call 9-1-1 and get the person to the nearest hospital.  For other signs that concern you, call your health care provider. Adverse reactions should be reported to the Vaccine Adverse Event Reporting System (VAERS). Your health care provider will usually file this report, or you can do it yourself. Visit the VAERS website at www.vaers. [facility].gov or call 6-716.387.8583. VAERS is only for reporting reactions, and VAERS staff do not give medical advice. The National Vaccine Injury Compensation Program  The National Vaccine Injury Compensation Program (VICP) is a federal program that was created to compensate people who may have been injured by certain vaccines. Visit the VICP website at www.hrsa.gov/vaccinecompensation or call 1-035-011-2033 to learn about the program and about filing a claim. There is a time limit to file a claim for compensation. How can I learn more? · Ask your healthcare provider. · Call your local or state health department. · Contact the Centers for Disease Control and Prevention (CDC):  ? Call 4-850.673.6898 (1-800-CDC-INFO) or  ? Visit CDC's website at www.cdc.gov/flu  Vaccine Information Statement (Interim)  Inactivated Influenza Vaccine  8/15/2019  [address] LUCINA Zaragoza 033SU-01  Department of Health and Human Services  Centers for Disease Control and Prevention  Many Vaccine Information Statements are available in [place] and other languages. See www.immunize.org/vis. Muchas hojas de información sobre vacunas están disponibles en español y en otros idiomas. Visite www.immunize.org/vis. Care instructions adapted under license by [facility] ([facility]).  If you have questions about a medical condition or this instruction, always ask your healthcare professional. [place] [address] any warranty or liability for your use of this information.

## 2020-11-13 LAB
6-ACETYLMORPHINE: NOT DETECTED
7-AMINOCLONAZEPAM: NOT DETECTED
ALPHA-OH-ALPRAZOLAM: PRESENT
ALPRAZOLAM: NOT DETECTED
AMPHETAMINE: NOT DETECTED
BARBITURATES: NOT DETECTED
BENZOYLECGONINE: NOT DETECTED
BUPRENORPHINE: NOT DETECTED
CARISOPRODOL: NOT DETECTED
CLONAZEPAM: NOT DETECTED
CODEINE: NOT DETECTED
CREATININE URINE: <20 MG/DL (ref 20–400)
DIAZEPAM: NOT DETECTED
DRUGS EXPECTED: ABNORMAL
EER PAIN MGT DRUG PANEL, HIGH RES/EMIT U: ABNORMAL
ETHYL GLUCURONIDE: PRESENT
FENTANYL: NOT DETECTED
HYDROCODONE: NOT DETECTED
HYDROMORPHONE: NOT DETECTED
LORAZEPAM: NOT DETECTED
MARIJUANA METABOLITE: NOT DETECTED
MDA: NOT DETECTED
MDEA: NOT DETECTED
MDMA URINE: NOT DETECTED
MEPERIDINE: NOT DETECTED
METHADONE: NOT DETECTED
METHAMPHETAMINE: NOT DETECTED
METHYLPHENIDATE: NOT DETECTED
MIDAZOLAM: NOT DETECTED
MORPHINE: NOT DETECTED
NORBUPRENORPHINE, FREE: NOT DETECTED
NORDIAZEPAM: NOT DETECTED
NORFENTANYL: NOT DETECTED
NORHYDROCODONE, URINE: NOT DETECTED
NOROXYCODONE: NOT DETECTED
NOROXYMORPHONE, URINE: NOT DETECTED
OXAZEPAM: NOT DETECTED
OXYCODONE: NOT DETECTED
OXYMORPHONE: NOT DETECTED
PAIN MANAGEMENT DRUG PANEL: ABNORMAL
PAIN MANAGEMENT DRUG PANEL: ABNORMAL
PCP: NOT DETECTED
PHENTERMINE: NOT DETECTED
PROPOXYPHENE: NOT DETECTED
TAPENTADOL, URINE: NOT DETECTED
TAPENTADOL-O-SULFATE, URINE: NOT DETECTED
TEMAZEPAM: NOT DETECTED
TRAMADOL: NOT DETECTED
ZOLPIDEM: NOT DETECTED

## 2020-11-14 ASSESSMENT — ENCOUNTER SYMPTOMS
RHINORRHEA: 0
VOMITING: 0
SORE THROAT: 0
ABDOMINAL PAIN: 0
NAUSEA: 0
CHEST TIGHTNESS: 0
COUGH: 1
SHORTNESS OF BREATH: 0
WHEEZING: 0
DIARRHEA: 0
CONSTIPATION: 0

## 2020-11-27 RX ORDER — ATORVASTATIN CALCIUM 40 MG/1
TABLET, FILM COATED ORAL
Qty: 30 TABLET | Refills: 5 | Status: SHIPPED | OUTPATIENT
Start: 2020-11-27 | End: 2021-06-10

## 2020-11-27 NOTE — TELEPHONE ENCOUNTER
Medication:   Requested Prescriptions     Pending Prescriptions Disp Refills    atorvastatin (LIPITOR) 40 MG tablet [Pharmacy Med Name: ATORVASTATIN 40 MG TABLET] 30 tablet 5     Sig: TAKE 1 TABLET BY MOUTH EVERY DAY AT NIGHT     Last Filled: 5.14.20    Last appt: 11/9/2020   Next appt: 2/9/2021    Last OARRS:   RX Monitoring 11/9/2020   Attestation -   Periodic Controlled Substance Monitoring No signs of potential drug abuse or diversion identified.

## 2020-11-30 RX ORDER — ALPRAZOLAM 1 MG/1
TABLET ORAL
Qty: 45 TABLET | Refills: 2 | Status: SHIPPED | OUTPATIENT
Start: 2020-11-30 | End: 2021-03-04 | Stop reason: SDUPTHER

## 2020-11-30 NOTE — TELEPHONE ENCOUNTER
Medication:   Requested Prescriptions     Pending Prescriptions Disp Refills    ALPRAZolam (XANAX) 1 MG tablet [Pharmacy Med Name: ALPRAZOLAM 1 MG TABLET] 45 tablet 1     Sig: TAKE 1/2 TABLET ONCE A DAY AND ONE TABLET AT NIGHT     Last Filled: 9.17.20    Last appt: 11/9/2020   Next appt: 2/9/2021    Last OARRS:   RX Monitoring 11/9/2020   Attestation -   Periodic Controlled Substance Monitoring No signs of potential drug abuse or diversion identified.

## 2020-11-30 NOTE — TELEPHONE ENCOUNTER
Medication:   Requested Prescriptions     Pending Prescriptions Disp Refills    ALPRAZolam (XANAX) 1 MG tablet 45 tablet 1        Last Filled:      Patient Phone Number: [phone] (home)     Last appt: 11/9/2020   Next appt: 2/9/2021    Last OARRS:   RX Monitoring 11/9/2020   Attestation -   Periodic Controlled Substance Monitoring No signs of potential drug abuse or diversion identified. Preferred Pharmacy:   [facility]/pharmacy #[id number]- [place], [place] - [address] [phone] - F [phone]  [address]  [address] [facility]  Phone: [phone] Fax: [phone]    [place] [address] Pauline Ma [address] [place] [address]  Phone: [phone] Fax: [phone]

## 2020-12-01 RX ORDER — ALPRAZOLAM 1 MG/1
TABLET ORAL
Qty: 45 TABLET | Refills: 1 | OUTPATIENT
Start: 2020-12-01 | End: 2020-12-31

## 2020-12-04 RX ORDER — LEVOTHYROXINE SODIUM 0.1 MG/1
TABLET ORAL
Qty: 30 TABLET | Refills: 5 | Status: SHIPPED | OUTPATIENT
Start: 2020-12-04 | End: 2021-03-22

## 2020-12-04 NOTE — TELEPHONE ENCOUNTER
Medication:   Requested Prescriptions     Pending Prescriptions Disp Refills    levothyroxine (SYNTHROID) 100 MCG tablet [Pharmacy Med Name: LEVOTHYROXINE 100 MCG TABLET] 30 tablet 0     Sig: TAKE 1 TABLET BY MOUTH EVERY DAY     Last Filled: 11.3.20    Last appt: 11/9/2020   Next appt: 2/9/2021    Last OARRS:   RX Monitoring 11/9/2020   Attestation -   Periodic Controlled Substance Monitoring No signs of potential drug abuse or diversion identified.

## 2021-02-01 DIAGNOSIS — M25.562 LEFT KNEE PAIN, UNSPECIFIED CHRONICITY: ICD-10-CM

## 2021-02-01 NOTE — TELEPHONE ENCOUNTER
Medication:   Requested Prescriptions     Pending Prescriptions Disp Refills    diclofenac sodium (VOLTAREN) 1 % GEL [Pharmacy Med Name: DICLOFENAC SODIUM 1% GEL] 100 g 0     Sig: APPLY 2 G TOPICALLY 2 TIMES DAILY     Last Filled: 11.9.20    Last appt: 11/9/2020   Next appt: 2/9/2021    Last OARRS:   RX Monitoring 11/9/2020   Attestation -   Periodic Controlled Substance Monitoring No signs of potential drug abuse or diversion identified.

## 2021-03-01 DIAGNOSIS — J44.9 CHRONIC OBSTRUCTIVE PULMONARY DISEASE, UNSPECIFIED COPD TYPE (HCC): ICD-10-CM

## 2021-03-01 RX ORDER — BUDESONIDE AND FORMOTEROL FUMARATE DIHYDRATE 160; 4.5 UG/1; UG/1
AEROSOL RESPIRATORY (INHALATION)
Qty: 10.2 INHALER | Refills: 3 | Status: SHIPPED | OUTPATIENT
Start: 2021-03-01 | End: 2021-08-02

## 2021-03-01 NOTE — TELEPHONE ENCOUNTER
Medication:   Requested Prescriptions     Pending Prescriptions Disp Refills    SYMBICORT 160-4.5 MCG/ACT AERO [Pharmacy Med Name: Anahi Alvarez 160-4.5 MCG INHALER] 10.2 Inhaler 3     Sig: TAKE 2 PUFFS BY MOUTH TWICE A DAY     Last Filled: 6.26.20    Last appt: 11/9/2020   Next appt: 3/4/2021    Last OARRS:   RX Monitoring 11/9/2020   Attestation -   Periodic Controlled Substance Monitoring No signs of potential drug abuse or diversion identified.

## 2021-03-04 ENCOUNTER — OFFICE VISIT (OUTPATIENT)
Dept: PRIMARY CARE CLINIC | Age: 71
End: 2021-03-04
Payer: MEDICARE

## 2021-03-04 VITALS
OXYGEN SATURATION: 97 % | DIASTOLIC BLOOD PRESSURE: 88 MMHG | SYSTOLIC BLOOD PRESSURE: 134 MMHG | WEIGHT: 183.2 LBS | BODY MASS INDEX: 28.69 KG/M2 | RESPIRATION RATE: 16 BRPM | TEMPERATURE: 98 F | HEART RATE: 78 BPM

## 2021-03-04 DIAGNOSIS — E78.2 MIXED HYPERLIPIDEMIA: ICD-10-CM

## 2021-03-04 DIAGNOSIS — Z12.5 SCREENING PSA (PROSTATE SPECIFIC ANTIGEN): ICD-10-CM

## 2021-03-04 DIAGNOSIS — F41.1 GENERALIZED ANXIETY DISORDER: Primary | ICD-10-CM

## 2021-03-04 DIAGNOSIS — E03.9 ACQUIRED HYPOTHYROIDISM: ICD-10-CM

## 2021-03-04 DIAGNOSIS — I10 ESSENTIAL HYPERTENSION: ICD-10-CM

## 2021-03-04 DIAGNOSIS — R73.03 PREDIABETES: ICD-10-CM

## 2021-03-04 DIAGNOSIS — J44.9 CHRONIC OBSTRUCTIVE PULMONARY DISEASE, UNSPECIFIED COPD TYPE (HCC): ICD-10-CM

## 2021-03-04 DIAGNOSIS — K63.5 POLYP OF COLON, UNSPECIFIED PART OF COLON, UNSPECIFIED TYPE: ICD-10-CM

## 2021-03-04 DIAGNOSIS — N18.31 STAGE 3A CHRONIC KIDNEY DISEASE (HCC): ICD-10-CM

## 2021-03-04 DIAGNOSIS — M10.079 IDIOPATHIC GOUT OF FOOT, UNSPECIFIED CHRONICITY, UNSPECIFIED LATERALITY: ICD-10-CM

## 2021-03-04 PROCEDURE — 99214 OFFICE O/P EST MOD 30 MIN: CPT | Performed by: INTERNAL MEDICINE

## 2021-03-04 RX ORDER — ALPRAZOLAM 1 MG/1
TABLET ORAL
Qty: 45 TABLET | Refills: 2 | Status: SHIPPED | OUTPATIENT
Start: 2021-03-04 | End: 2021-06-17

## 2021-03-04 ASSESSMENT — PATIENT HEALTH QUESTIONNAIRE - PHQ9
2. FEELING DOWN, DEPRESSED OR HOPELESS: 0
SUM OF ALL RESPONSES TO PHQ QUESTIONS 1-9: 0

## 2021-03-04 NOTE — PROGRESS NOTES
Alix Acosta   Date ofBirth:  1950    Date of Visit:  3/4/2021    Chief Complaint   Patient presents with    Anxiety    Hypertension    Hyperlipidemia    COPD    Hypothyroidism    Other     Prediabetes       HPI  Anxiety- Patient takes Xanax 1mg 1/2 tablet po q evening and 1 tablet po qhs. Patient's anxiety has been stable. Patient states he hasn't had any panic attacks recently. Hypertension-Patient takes Losartan 50mg po q day and Carvedilol 6.25mg po bid. Patient decreases salt. Patient rides a stationary bike for 30 minutes 4 times per week and uses resistance bands.      Hyperlipidemia- Patient takes Atorvastatin 40mg po qhs. Patient decreases fat and cholesterol. COPD- Patient uses Symbicort HFA  inhaler 160-4.5mcg 2 puffs bid. Patient uses ProAir HFA inhaler as needed. Patient denies SOB and wheezing.     Hypothyroidism- Patient akes Levothyroxine 100mcg po q day. Patient denies weight gain, constipation, and cold intolerance. Patient states he has fatigue but makes himself do things. Patient states he wakes up at 4:00am and works until 12-1 pm.     Prediabetes- Patient decreases carbohydrates.      Gout - Patient denies recent gout. Patient takes Colchicine prn.      Chronic kidney disease- Patient avoids NSAID's.     Review of Systems   Constitutional: Positive for fatigue. Negative for activity change, appetite change, chills, fever and unexpected weight change. HENT: Negative for congestion, ear pain, postnasal drip, rhinorrhea, sneezing, sore throat and trouble swallowing. Eyes: Negative for visual disturbance. Respiratory: Negative for cough, chest tightness, shortness of breath and wheezing. Cardiovascular: Negative for chest pain, palpitations and leg swelling. Gastrointestinal: Negative for abdominal pain, constipation, diarrhea, nausea and vomiting. Endocrine: Negative for cold intolerance and heat intolerance.    Genitourinary: Negative for dysuria, frequency and hematuria. Musculoskeletal: Negative for arthralgias and myalgias. Neurological: Negative for dizziness, syncope, light-headedness, numbness and headaches. Psychiatric/Behavioral: Negative for decreased concentration, dysphoric mood and sleep disturbance. The patient is nervous/anxious. Allergies   Allergen Reactions    Latex Rash    Contrast [Gadolinium Derivatives] Shortness Of Breath and Anxiety    Soap Rash     Outpatient Medications Marked as Taking for the 3/4/21 encounter (Office Visit) with Fran Ross MD   Medication Sig Dispense Refill    ALPRAZolam (XANAX) 1 MG tablet TAKE 1/2 TABLET ONCE A DAY AND ONE TABLET AT NIGHT 45 tablet 2    SYMBICORT 160-4.5 MCG/ACT AERO TAKE 2 PUFFS BY MOUTH TWICE A DAY 10.2 Inhaler 3    diclofenac sodium (VOLTAREN) 1 % GEL APPLY 2 G TOPICALLY 2 TIMES DAILY 100 g 3    levothyroxine (SYNTHROID) 100 MCG tablet TAKE 1 TABLET BY MOUTH EVERY DAY 30 tablet 5    atorvastatin (LIPITOR) 40 MG tablet TAKE 1 TABLET BY MOUTH EVERY DAY AT NIGHT 30 tablet 5    colchicine (COLCRYS) 0.6 MG tablet Take 1 tablet by mouth daily PRN      carvedilol (COREG) 6.25 MG tablet Take 1 tablet by mouth 2 times daily (with meals)      torsemide (DEMADEX) 20 MG tablet Take 2 tablets by mouth daily      losartan (COZAAR) 25 MG tablet Take 50 mg by mouth daily       spironolactone (ALDACTONE) 25 MG tablet Take 0.5 tablets by mouth daily      potassium chloride (KLOR-CON M) 10 MEQ extended release tablet Take 1 tablet by mouth daily 30 tablet 3    albuterol (PROVENTIL) (2.5 MG/3ML) 0.083% nebulizer solution Take 2.5 mg by nebulization every 6 hours as needed for Wheezing or Shortness of Breath      nicotine (NICODERM CQ) 21 MG/24HR Place 1 patch onto the skin every 24 hours PRN      Multiple Vitamins-Minerals (CENTRUM SILVER ADULT 50+ PO) Take 1 tablet by mouth daily      aspirin 81 MG tablet Take 1 tablet by mouth daily.  30 tablet 5    albuterol (PROAIR HFA) 108 (90 BASE) MCG/ACT inhaler Inhale 2 puffs into the lungs every 6 hours as needed. Vitals:    03/04/21 1123   BP: 134/88   Pulse: 78   Resp: 16   Temp: 98 °F (36.7 °C)   SpO2: 97%   Weight: 183 lb 3.2 oz (83.1 kg)     Body mass index is 28.69 kg/m². Physical Exam  Nursing note reviewed. Constitutional:       General: He is not in acute distress. Appearance: Normal appearance. He is well-developed. HENT:      Right Ear: Tympanic membrane and ear canal normal.      Left Ear: Tympanic membrane and ear canal normal.   Eyes:      General: Lids are normal.      Extraocular Movements: Extraocular movements intact. Conjunctiva/sclera: Conjunctivae normal.      Pupils: Pupils are equal, round, and reactive to light. Neck:      Musculoskeletal: Neck supple. Thyroid: No thyromegaly. Vascular: No carotid bruit. Cardiovascular:      Rate and Rhythm: Normal rate and regular rhythm. Heart sounds: Normal heart sounds, S1 normal and S2 normal. No murmur. No friction rub. No gallop. Pulmonary:      Effort: Pulmonary effort is normal. No respiratory distress. Breath sounds: Normal breath sounds. No wheezing, rhonchi or rales. Abdominal:      General: Bowel sounds are normal. There is no distension. Palpations: Abdomen is soft. Tenderness: There is no abdominal tenderness. Musculoskeletal:      Right lower leg: No edema. Left lower leg: No edema. Lymphadenopathy:      Head:      Right side of head: No submandibular adenopathy. Left side of head: No submandibular adenopathy. Neurological:      Mental Status: He is alert. Psychiatric:         Mood and Affect: Mood normal.           No results found for this visit on 03/04/21.   Lab Review   Orders Only on 02/26/2021   Component Date Value    BNP 02/26/2021 88     BASIC METABOLIC PANEL 93/57/9841 NA     Sodium 02/26/2021 139     Potassium 02/26/2021 4.4     Chloride 02/26/2021 102     CO2 02/26/2021 30.7     Anion Gap 02/26/2021 6*    POC Glucose 02/26/2021 95     BUN 02/26/2021 20     CREATININE 02/26/2021 1.20     Calcium 02/26/2021 9.7     Age Time 02/26/2021 70     GFR Non- 02/26/2021 60     GFR  02/26/2021 72    Office Visit on 11/09/2020   Component Date Value    Drugs Expected 11/10/2020 SEE ATTACHED     Pain Management Drug Pan* 11/10/2020 Inconsistent     Creatinine, Ur 11/10/2020 <20.0*    Codeine 11/10/2020 Not Detected     Morphine 11/10/2020 Not Detected     6-Acetylmorphine 11/10/2020 Not Detected     Oxycodone 11/10/2020 Not Detected     Noroxycodone 11/10/2020 Not Detected     Oxymorphone 11/10/2020 Not Detected     NOROXYMORPHONE, URINE 11/10/2020 Not Detected     Hydrocodone 11/10/2020 Not Detected     NORHYDROCODONE, URINE 11/10/2020 Not Detected     Hydromorphone 11/10/2020 Not Detected     Buprenorphine 11/10/2020 Not Detected     Norbuprenorphine 11/10/2020 Not Detected     Fentanyl 11/10/2020 Not Detected     Norfentanyl 11/10/2020 Not Detected     Meperidine 11/10/2020 Not Detected     Tapentadol, Urine 11/10/2020 Not Detected     Tapentadol-O-Sulfate, Ur* 11/10/2020 Not Detected     Methadone 11/10/2020 Not Detected     Propoxyphene 11/10/2020 Not Detected     Tramadol 11/10/2020 Not Detected     Amphetamine 11/10/2020 Not Detected     Methamphetamine 11/10/2020 Not Detected     MDMA, Urine 11/10/2020 Not Detected     MDA 11/10/2020 Not Detected     MDEA 11/10/2020 Not Detected     Methylphenidate 11/10/2020 Not Detected     Phentermine 11/10/2020 Not Detected     Benzoylecgonine 11/10/2020 Not Detected     Alprazolam 11/10/2020 Not Detected     Alpha-OH-alprazolam 11/10/2020 Present     Clonazepam 11/10/2020 Not Detected     7-aminoclonazepam 11/10/2020 Not Detected     Diazepam 11/10/2020 Not Detected     NORDIAZEPAM 11/10/2020 Not Detected     OXAZEPAM 11/10/2020 Not Detected     TEMAZEPAM 11/10/2020 Not Detected     Lorazepam 11/10/2020 Not Detected     Midazolam 11/10/2020 Not Detected     Zolpidem 11/10/2020 Not Detected     Barbiturates 11/10/2020 Not Detected     Ethyl Glucuronide 11/10/2020 Present     Marijuana Metabolite 11/10/2020 Not Detected     PCP 11/10/2020 Not Detected     CARISOPRODOL 11/10/2020 Not Detected     Pain Management Drug Pan* 11/10/2020 See Below     EER Pain Mgt Drug Panel,* 11/10/2020 See Note    Orders Only on 11/08/2020   Component Date Value    BNP 11/08/2020 163    Orders Only on 11/08/2020   Component Date Value    Thyrotropin Releasing Ho* 11/08/2020 1.28    Orders Only on 11/08/2020   Component Date Value    COMPREHENSIVE METABOLIC * 28/11/0993 NA     Sodium 11/08/2020 140     Potassium 11/08/2020 4.4     Chloride 11/08/2020 101     CO2 11/08/2020 31.2     Anion Gap 11/08/2020 8     POC Glucose 11/08/2020 101     BUN 11/08/2020 25*    CREATININE 11/08/2020 1.48*    Calcium 11/08/2020 9.4     Albumin 11/08/2020 4.0     Total Protein 11/08/2020 7.1     Total Bilirubin 11/08/2020 0.86     Alkaline Phosphatase 11/08/2020 91     AST 11/08/2020 11*    ALT 11/08/2020 18*    Age Time 11/08/2020 70     GFR Non- 11/08/2020 47     GFR  11/08/2020 57    Orders Only on 11/08/2020   Component Date Value    LIPID PANEL 11/08/2020 NA     Cholesterol, Total 11/08/2020 201*    Triglycerides 11/08/2020 169*    HDL 11/08/2020 79     LDL Direct 11/08/2020 88     VLDL 11/08/2020 34    Orders Only on 11/08/2020   Component Date Value    Hemoglobin A1C 11/08/2020 6.1     eAG 11/08/2020 128.4          Assessment/Plan     1. Generalized anxiety disorder  - stable  - Continue ALPRAZolam (XANAX) 1 MG tablet; TAKE 1/2 TABLET ONCE A DAY AND ONE TABLET AT NIGHT  Dispense: 45 tablet; Refill: 2    2.  Essential hypertension  -stable  -Continue same medications  -Low sodium diet  -Regular aerobic exercise  - Comprehensive Metabolic Panel; Future    3. Mixed hyperlipidemia  - stable  -Continue same medications  -Low fat, low cholesterol diet  -Regular aerobic exercise  - Comprehensive Metabolic Panel; Future  - Lipid Panel; Future    4. Chronic obstructive pulmonary disease, unspecified [facility] ([facility]  -stable  -Continue same medications    5. Prediabetes  -Low carbohydrate diet  -Regular aerobic exercise  - Hemoglobin A1C; Future    6. Stage 3a chronic kidney disease  - stable  -Avoid NSAID's such as otc Ibuprofen, Advil, Motrin, Naprosyn, and Aleve as well as prescription NSAID's  - Comprehensive Metabolic Panel; Future    7. Acquired hypothyroidism  -stable  -Continue same medications  - TSH without Reflex; Future    8. Idiopathic gout of foot, unspecified chronicity, unspecified laterality  -stable  -Continue same medications  - URIC ACID; Future    9. Polyp of colon, unspecified part of colon, unspecified type  - External Referral To Gastroenterology for colonoscopy    10. Screening PSA (prostate specific antigen)  - PSA screening; Future       Discussed medications with patient, who voiced understanding of their use and indications. All questions answered. Controlled Substance Monitoring:    Acute and Chronic Pain Monitoring:   RX Monitoring 3/4/2021   Attestation -   Periodic Controlled Substance Monitoring No signs of potential drug abuse or diversion identified. Return in about 3 months (around 6/4/2021) for Medicare AWV.

## 2021-03-04 NOTE — PATIENT INSTRUCTIONS
-Continue same medications  -Low sodium diet  -Low fat, low cholesterol diet  -low carbohydrate diet  -Regular aerobic exercise

## 2021-03-11 PROBLEM — K63.5 POLYP OF COLON: Status: ACTIVE | Noted: 2021-03-11

## 2021-03-11 ASSESSMENT — ENCOUNTER SYMPTOMS
NAUSEA: 0
VOMITING: 0
ABDOMINAL PAIN: 0
TROUBLE SWALLOWING: 0
SHORTNESS OF BREATH: 0
WHEEZING: 0
COUGH: 0
DIARRHEA: 0
CONSTIPATION: 0
RHINORRHEA: 0
SORE THROAT: 0
CHEST TIGHTNESS: 0

## 2021-03-21 DIAGNOSIS — E03.9 ACQUIRED HYPOTHYROIDISM: ICD-10-CM

## 2021-03-22 LAB
AGE TIME: 70 YRS
ALBUMIN SERPL-MCNC: 3.8 G/DL (ref 3.5–5)
ALP BLD-CCNC: 102 U/L (ref 46–116)
ALT SERPL-CCNC: 22 U/L (ref 21–72)
ANION GAP SERPL CALCULATED.3IONS-SCNC: 5 MMOL/L (ref 7–16)
AST SERPL-CCNC: 16 U/L (ref 17–59)
B-TYPE NATRIURETIC PEPTIDE: 169 PG/ML
BILIRUB SERPL-MCNC: 0.62 MG/DL (ref 0.1–1.2)
BUN BLDV-MCNC: 24 MG/DL (ref 7–20)
CALCIUM SERPL-MCNC: 9.3 MG/DL (ref 8.5–10.1)
CHLORIDE BLD-SCNC: 103 MMOL/L (ref 98–108)
CHOLESTEROL, TOTAL: 165 MG/DL (ref 0–200)
CO2: 32.1 MMOL/L (ref 21–32)
COMPREHENSIVE METABOLIC PANEL: ABNORMAL
CREAT SERPL-MCNC: 1.23 MG/DL (ref 0.7–1.3)
ESTIMATED AVERAGE GLUCOSE: 119.8
GFR AFRICAN AMERICAN: 70 ML/MIN
GFR NON-AFRICAN AMERICAN: 58 ML/MIN
GLUCOSE BLD-MCNC: 89 MG/DL (ref 70–110)
HBA1C MFR BLD: 5.8 % (ref 4.5–6.2)
HDLC SERPL-MCNC: 62 MG/DL (ref 40–100)
LDL CHOLESTEROL DIRECT: 76 MG/DL (ref 0–160)
LIPID PANEL: NORMAL
POTASSIUM SERPL-SCNC: 4.7 MMOL/L (ref 3.6–5)
PROSTATE SPECIFIC ANTIGEN: 1.38 NG/ML (ref 0–4)
SODIUM BLD-SCNC: 140 MMOL/L (ref 137–148)
THYROTROPIN RELEASING HORMONE: 1.02 UIU/ML (ref 0.34–4.82)
TOTAL PROTEIN: 6.8 G/DL (ref 6.4–8.2)
TRIGL SERPL-MCNC: 134 MG/DL (ref 0–150)
URIC ACID, SERUM: 8.5 MG/DL (ref 3.5–7.2)
VLDLC SERPL CALC-MCNC: 27 MG/DL

## 2021-03-22 RX ORDER — LEVOTHYROXINE SODIUM 0.1 MG/1
TABLET ORAL
Qty: 90 TABLET | Refills: 1 | Status: SHIPPED | OUTPATIENT
Start: 2021-03-22 | End: 2021-11-01

## 2021-05-04 ENCOUNTER — OFFICE VISIT (OUTPATIENT)
Dept: DERMATOLOGY | Age: 71
End: 2021-05-04
Payer: MEDICARE

## 2021-05-04 VITALS — TEMPERATURE: 97.9 F

## 2021-05-04 DIAGNOSIS — L30.9 CHRONIC DERMATITIS: Primary | ICD-10-CM

## 2021-05-04 PROCEDURE — 11900 INJECT SKIN LESIONS </W 7: CPT | Performed by: DERMATOLOGY

## 2021-05-04 PROCEDURE — 99213 OFFICE O/P EST LOW 20 MIN: CPT | Performed by: DERMATOLOGY

## 2021-05-04 RX ORDER — CLOBETASOL PROPIONATE 0.5 MG/G
OINTMENT TOPICAL
Qty: 60 G | Refills: 2 | Status: SHIPPED | OUTPATIENT
Start: 2021-05-04 | End: 2022-08-30 | Stop reason: SDUPTHER

## 2021-05-10 ENCOUNTER — TELEPHONE (OUTPATIENT)
Dept: PRIMARY CARE CLINIC | Age: 71
End: 2021-05-10

## 2021-05-10 NOTE — TELEPHONE ENCOUNTER
----- Message from Roddy Rodríguez sent at 5/10/2021  8:07 AM EDT -----  Subject: Referral Request    QUESTIONS   Reason for referral request? patient needs a referral for an orthopaedic   doctor for his knee   Has the physician seen you for this condition before? No   Preferred Specialist (if applicable)? Do you already have an appointment scheduled? No  Additional Information for Provider? patient needs a referral for an   orthopaedic doctor for his knee  ---------------------------------------------------------------------------  --------------  CALL BACK INFO  What is the best way for the office to contact you? OK to leave message on   voicemail  Preferred Call Back Phone Number?  [phone]

## 2021-05-13 ENCOUNTER — OFFICE VISIT (OUTPATIENT)
Dept: ORTHOPEDIC SURGERY | Age: 71
End: 2021-05-13
Payer: MEDICARE

## 2021-05-13 VITALS — BODY MASS INDEX: 28.75 KG/M2 | WEIGHT: 183.2 LBS | HEIGHT: 67 IN

## 2021-05-13 DIAGNOSIS — M25.562 LEFT KNEE PAIN, UNSPECIFIED CHRONICITY: ICD-10-CM

## 2021-05-13 DIAGNOSIS — M25.469 EFFUSION OF KNEE, UNSPECIFIED LATERALITY: ICD-10-CM

## 2021-05-13 DIAGNOSIS — Z87.39 HX OF GOUT: ICD-10-CM

## 2021-05-13 DIAGNOSIS — S83.242A TEAR OF MEDIAL MENISCUS OF LEFT KNEE, CURRENT, UNSPECIFIED TEAR TYPE, INITIAL ENCOUNTER: ICD-10-CM

## 2021-05-13 DIAGNOSIS — M17.12 PRIMARY OSTEOARTHRITIS OF LEFT KNEE: Primary | ICD-10-CM

## 2021-05-13 PROCEDURE — 99204 OFFICE O/P NEW MOD 45 MIN: CPT | Performed by: INTERNAL MEDICINE

## 2021-05-13 PROCEDURE — 20611 DRAIN/INJ JOINT/BURSA W/US: CPT | Performed by: INTERNAL MEDICINE

## 2021-05-13 PROCEDURE — MISC915 KNEE SLEEVE OPEN OR CLOSED - BREG: Performed by: INTERNAL MEDICINE

## 2021-05-13 RX ORDER — METHYLPREDNISOLONE ACETATE 40 MG/ML
40 INJECTION, SUSPENSION INTRA-ARTICULAR; INTRALESIONAL; INTRAMUSCULAR; SOFT TISSUE ONCE
Status: COMPLETED | OUTPATIENT
Start: 2021-05-13 | End: 2021-05-13

## 2021-05-13 RX ORDER — BUPIVACAINE HYDROCHLORIDE 2.5 MG/ML
3 INJECTION, SOLUTION INFILTRATION; PERINEURAL ONCE
Status: COMPLETED | OUTPATIENT
Start: 2021-05-13 | End: 2021-05-13

## 2021-05-13 RX ADMIN — BUPIVACAINE HYDROCHLORIDE 7.5 MG: 2.5 INJECTION, SOLUTION INFILTRATION; PERINEURAL at 13:34

## 2021-05-13 RX ADMIN — METHYLPREDNISOLONE ACETATE 40 MG: 40 INJECTION, SUSPENSION INTRA-ARTICULAR; INTRALESIONAL; INTRAMUSCULAR; SOFT TISSUE at 13:36

## 2021-05-13 NOTE — PROGRESS NOTES
AND ONE TABLET AT NIGHT 45 tablet 2    SYMBICORT 160-4.5 MCG/ACT AERO TAKE 2 PUFFS BY MOUTH TWICE A DAY 10.2 Inhaler 3    diclofenac sodium (VOLTAREN) 1 % GEL APPLY 2 G TOPICALLY 2 TIMES DAILY 100 g 3    atorvastatin (LIPITOR) 40 MG tablet TAKE 1 TABLET BY MOUTH EVERY DAY AT NIGHT 30 tablet 5    colchicine (COLCRYS) 0.6 MG tablet Take 1 tablet by mouth daily PRN      carvedilol (COREG) 6.25 MG tablet Take 1 tablet by mouth 2 times daily (with meals)      torsemide (DEMADEX) 20 MG tablet Take 2 tablets by mouth daily      losartan (COZAAR) 25 MG tablet Take 50 mg by mouth daily       spironolactone (ALDACTONE) 25 MG tablet Take 0.5 tablets by mouth daily      potassium chloride (KLOR-CON M) 10 MEQ extended release tablet Take 1 tablet by mouth daily 30 tablet 3    albuterol (PROVENTIL) (2.5 MG/3ML) 0.083% nebulizer solution Take 2.5 mg by nebulization every 6 hours as needed for Wheezing or Shortness of Breath      nicotine (NICODERM CQ) 21 MG/24HR Place 1 patch onto the skin every 24 hours PRN      Multiple Vitamins-Minerals (CENTRUM SILVER ADULT 50+ PO) Take 1 tablet by mouth daily      aspirin 81 MG tablet Take 1 tablet by mouth daily. 30 tablet 5    albuterol (PROAIR HFA) 108 (90 BASE) MCG/ACT inhaler Inhale 2 puffs into the lungs every 6 hours as needed. Current Facility-Administered Medications   Medication Dose Route Frequency Provider Last Rate Last Admin    bupivacaine (MARCAINE) 0.25 % injection 7.5 mg  3 mL Intra-articular Once Yolanda Boggs MD        methylPREDNISolone acetate (DEPO-MEDROL) injection 40 mg  40 mg Intra-articular Once Yolanda Boggs MD             Allergies:         Allergies   Allergen Reactions    Latex Rash    Contrast [Gadolinium Derivatives] Shortness Of Breath and Anxiety    Soap Rash        Social History:         Social History     Socioeconomic History    Marital status: Single     Spouse name: Not on file    Number of children: Not on file    Years of education: Not on file    Highest education level: Not on file   Occupational History    Not on file   Social Needs    Financial resource strain: Not hard at all    Food insecurity     Worry: Never true     Inability: Never true    Transportation needs     Medical: No     Non-medical: No   Tobacco Use    Smoking status: Former Smoker     Packs/day: 0.25     Years: 24.00     Pack years: 6.00     Types: Cigarettes     Start date: 1968     Quit date: 1994     Years since quittin.3    Smokeless tobacco: Never Used   Substance and Sexual Activity    Alcohol use: Yes     Alcohol/week: 1.0 standard drinks     Types: 1 Cans of beer per week     Comment: rare    Drug use: No    Sexual activity: Not on file   Lifestyle    Physical activity     Days per week: Not on file     Minutes per session: Not on file    Stress: Not on file   Relationships    Social connections     Talks on phone: Not on file     Gets together: Not on file     Attends Baptism service: Not on file     Active member of club or organization: Not on file     Attends meetings of clubs or organizations: Not on file     Relationship status: Not on file    Intimate partner violence     Fear of current or ex partner: Not on file     Emotionally abused: Not on file     Physically abused: Not on file     Forced sexual activity: Not on file   Other Topics Concern    Not on file   Social History Narrative    Not on file        Review of Symptoms:    Pertinent items are noted in HPI    Review of systems reviewed from Patient History Form dated on today's date and   available in the patient's chart under the Media tab. Vital Signs: There were no vitals filed for this visit. General Exam:     Constitutional: Patient is adequately groomed with no evidence of malnutrition  Mental Status: The patient is oriented to time, place and person. The patient's mood and affect are appropriate.   Vascular: to provide functional support and assist in protecting the affected area. Verbal and written instructions for the use of and application of this item were provided. The patient was educated and fit by a healthcare professional with expert knowledge and specialization in brace application. They were instructed to contact the office immediately should the equipment result in increased pain, decreased sensation, increased swelling or worsening of the condition.  XR KNEE LEFT (MIN 4 VIEWS)     Standing Status:   Future     Number of Occurrences:   1     Standing Expiration Date:   5/13/2022     Order Specific Question:   Reason for exam:     Answer:   knee pain    XR KNEE RIGHT (3 VIEWS)     Standing Status:   Future     Number of Occurrences:   1     Standing Expiration Date:   5/13/2022     Order Specific Question:   Reason for exam:     Answer:   knee pain    US GUIDED NEEDLE PLACEMENT     Standing Status:   Future     Number of Occurrences:   1     Standing Expiration Date:   5/13/2022     Order Specific Question:   Reason for exam:     Answer:   CSI    MRI KNEE LEFT [address] in [place], [place], pt will call to schedule  [id number] [facility] will obtain auth and fwd to your facility  Please fax results to [phone]  Pt advised to f/u in clinic 1-2 days after MRI for results     Standing Status:   Future     Standing Expiration Date:   5/13/2022     Order Specific Question:   Reason for exam:     Answer:   r/o MMT, LMT    AK ARTHROCENTESIS ASPIR&/INJ MAJOR JT/BURSA W/O US     Logic E Ultrasound / linear transducer 10 HZ    The patient was placed supine on the examination table with the knees supported. The     Lower extremity was slightly abducted . The ultrasound was placed on knee preset function and the linear transducer was placed transversely  over the medial patellofemoral joint and the medial patella femoral  joint recess was identified.   The skin was prepped in sterile

## 2021-05-18 ENCOUNTER — TELEPHONE (OUTPATIENT)
Dept: ORTHOPEDIC SURGERY | Age: 71
End: 2021-05-18

## 2021-05-18 DIAGNOSIS — M17.12 PRIMARY OSTEOARTHRITIS OF LEFT KNEE: Primary | ICD-10-CM

## 2021-05-18 RX ORDER — MELOXICAM 15 MG/1
15 TABLET ORAL DAILY PRN
Qty: 30 TABLET | Refills: 1 | Status: SHIPPED | OUTPATIENT
Start: 2021-05-18 | End: 2021-06-07 | Stop reason: ALTCHOICE

## 2021-05-18 NOTE — TELEPHONE ENCOUNTER
General Question     Subject: PAIN LT. KNEE  Patient and /or Facility Request: Caron Wilder  Contact Number: [phone]  PATIENT WOULD LIKE SOMETHING FOR PAIN BUT NOT PAIN MEDICATION

## 2021-05-18 NOTE — TELEPHONE ENCOUNTER
He can use voltaren gel as an alternative sent e-scribe.   Recommend f/u w/ MRI prior to starting PT

## 2021-05-18 NOTE — TELEPHONE ENCOUNTER
Spoke to pt, he is nervous about taking meds with congestive heart failure. Also would like to know if he can have a PT script for the [facility] [place] office,  please advise.

## 2021-05-19 ENCOUNTER — TELEPHONE (OUTPATIENT)
Dept: ORTHOPEDIC SURGERY | Age: 71
End: 2021-05-19

## 2021-05-21 ENCOUNTER — TELEPHONE (OUTPATIENT)
Dept: ORTHOPEDIC SURGERY | Age: 71
End: 2021-05-21

## 2021-05-21 LAB
AGE TIME: 70 YRS
ANION GAP SERPL CALCULATED.3IONS-SCNC: 6 MMOL/L (ref 7–16)
B-TYPE NATRIURETIC PEPTIDE: 62 PG/ML
BASIC METABOLIC PANEL: ABNORMAL
BUN BLDV-MCNC: 26 MG/DL (ref 7–20)
CALCIUM SERPL-MCNC: 9.1 MG/DL (ref 8.5–10.1)
CHLORIDE BLD-SCNC: 104 MMOL/L (ref 98–108)
CO2: 30.5 MMOL/L (ref 21–32)
CREAT SERPL-MCNC: 1.29 MG/DL (ref 0.7–1.3)
GFR AFRICAN AMERICAN: 67 ML/MIN
GFR NON-AFRICAN AMERICAN: 55 ML/MIN
GLUCOSE BLD-MCNC: 104 MG/DL (ref 70–110)
POTASSIUM SERPL-SCNC: 4.2 MMOL/L (ref 3.6–5)
SODIUM BLD-SCNC: 140 MMOL/L (ref 137–148)

## 2021-05-24 ENCOUNTER — OFFICE VISIT (OUTPATIENT)
Dept: ORTHOPEDIC SURGERY | Age: 71
End: 2021-05-24
Payer: MEDICARE

## 2021-05-24 VITALS — HEIGHT: 67 IN | WEIGHT: 183 LBS | BODY MASS INDEX: 28.72 KG/M2

## 2021-05-24 DIAGNOSIS — M77.51 CAPSULITIS OF ANKLE, RIGHT: ICD-10-CM

## 2021-05-24 DIAGNOSIS — M25.471 ANKLE EFFUSION, RIGHT: ICD-10-CM

## 2021-05-24 DIAGNOSIS — M17.12 PRIMARY OSTEOARTHRITIS OF LEFT KNEE: ICD-10-CM

## 2021-05-24 DIAGNOSIS — M94.271 CHONDROMALACIA OF RIGHT ANKLE: Primary | ICD-10-CM

## 2021-05-24 PROCEDURE — L1902 AFO ANKLE GAUNTLET PRE OTS: HCPCS | Performed by: INTERNAL MEDICINE

## 2021-05-24 PROCEDURE — 99214 OFFICE O/P EST MOD 30 MIN: CPT | Performed by: INTERNAL MEDICINE

## 2021-05-24 PROCEDURE — MISC915 KNEE SLEEVE OPEN OR CLOSED - BREG: Performed by: INTERNAL MEDICINE

## 2021-05-24 RX ORDER — METHYLPREDNISOLONE 4 MG/1
TABLET ORAL
Qty: 1 KIT | Refills: 0 | Status: SHIPPED | OUTPATIENT
Start: 2021-05-24 | End: 2021-06-07

## 2021-05-24 NOTE — PROGRESS NOTES
Chief Complaint:   Chief Complaint   Patient presents with    Foot Pain     Right foot pain, shoots up leg. 8/10 pain           History of Present Illness:       Patient is a 79 y.o. male presents with the above complaint. The symptoms began 2 daysago and started without an injury. The patient describes a aching, stiffness pain that does not radiate. The symptoms are constant  and are show no change since the onset. This past history significant for gout arthropathy presenting as podagra. Pain localizes to the ankle and  is predictably aggravated by weightbearing. There are not mechanical symptoms associated with the symptoms. There are notneuritic symptoms involving the foot or ankle. The patient admits to subjective instability about the foot or ankle and admits to new onset or progressive weakness of the lower extremity. Pain level 9    The patient admits to a pattern of activity related swelling. Treatment to date:ice    There is no no prior history of foot or ankle trauma. There is no prior history of autoimmune disease, crystal arthropathy, or crystal arthropathy. Past Medical History:        Past Medical History:   Diagnosis Date    Allergic rhinitis     Anxiety     Asthma     COPD (chronic obstructive pulmonary disease) ([facility] [address] [address]     Hyperlipidemia     Hypertension     Hypothyroidism     Soft tissue sarcoma (HCC)          Past Surgical History:   Procedure Laterality Date    COLONOSCOPY  8./16/2007    BN - 10 year f/u    SINUS SURGERY      SKIN CANCER EXCISION           Present Medications:         Current Outpatient Medications   Medication Sig Dispense Refill    methylPREDNISolone (MEDROL, ALEXIA,) 4 MG tablet By mouth.  1 kit 0    meloxicam (MOBIC) 15 MG tablet Take 1 tablet by mouth daily as needed for Pain 30 tablet 1    diclofenac sodium (VOLTAREN) 1 % GEL Apply 2 to 4 g 3 times daily for 2 weeks then twice daily as needed thereafter 100 g 3    clobetasol ([employer]) on file    Highest education level: Not on file   Occupational History    Not on file   Tobacco Use    Smoking status: Former Smoker     Packs/day: 0.25     Years: 24.00     Pack years: 6.00     Types: Cigarettes     Start date: 1968     Quit date: 1994     Years since quittin.4    Smokeless tobacco: Never Used   Vaping Use    Vaping Use: Never used   Substance and Sexual Activity    Alcohol use: Yes     Alcohol/week: 1.0 standard drinks     Types: 1 Cans of beer per week     Comment: rare    Drug use: No    Sexual activity: Not on file   Other Topics Concern    Not on file   Social History Narrative    Not on file     Social Determinants of Health     Financial Resource Strain:     Difficulty of Paying Living Expenses:    Food Insecurity:     Worried About Running Out of Food in the Last Year:     [address] in the Last Year:    Transportation Needs:     Lack of Transportation (Medical):  Lack of Transportation (Non-Medical):    Physical Activity:     Days of Exercise per Week:     Minutes of Exercise per Session:    Stress:     Feeling of Stress :    Social Connections:     Frequency of Communication with Friends and Family:     Frequency of Social Gatherings with Friends and Family:     Attends Islam Services:     Active Member of Clubs or Organizations:     Attends Club or Organization Meetings:     Marital Status:    Intimate Partner Violence:     Fear of Current or Ex-Partner:     Emotionally Abused:     Physically Abused:     Sexually Abused:         Review of Symptoms:    Pertinent items are noted in HPI    Review of systems reviewed from Patient History Form dated on 2021 and   available in the patient's chart under the Media tab. Vital Signs: There were no vitals filed for this visit. General Exam:     Constitutional: Patient is adequately groomed with no evidence of malnutrition  Mental Status:  The patient is oriented to time, place and person. The patient's mood and affect are appropriate. Vascular: Examination reveals no swelling or calf tenderness. Peripheral pulses are palpable and 2+. Lymphatics: no lymphadenopathy of the inguinal region or lower extremity      Physical Exam: left ankle      Primary Exam:    Inspection: Mild asymmetric soft tissue swelling minimal warmth mild erythema      Palpation: Tenderness over the anterior medial joint line, no focal tenderness in the foot      Range of Motion: Mild global restriction with low-grade pain with active and passive range of motion      Strength: Normal at the ankle without pain      Special Tests: Intrajoint talar tilt stable      Skin: There are no rashes, ulcerations or lesions. Gait: Near normal      Reflex intact lower     Additional Comments:        Additional Examinations:           Left Lower Extremity: Examination of the left lower extremity does not show any tenderness, deformity or injury. Range of motion is unremarkable. There is no gross instability. There are no rashes, ulcerations or lesions. Strength and tone are normal.   Neurolgic -Light touch sensation and manual muscle testing normal L2-S1. No fasiculations. Pattella tendon and Achilles tendon reflexes +2 bilaterally. Seated SLR negative        Office Imaging Results/Procedures PerformedToday:      Radiology:      X-rays obtained and reviewed in office:   Views 3 views of the foot 2 views ankle   Location right foot and ankle   Impression ankle mortise is anatomic no evidence of discrete fracture. Midfoot is anatomic no other soft tissue or osseous abnormalities       Office Procedures:     Orders Placed This Encounter   Procedures    Breg Knee Sleeve $20     Patient was supplied a Knee Sleeve. This retail item was supplied to provide functional support and assist in protecting the affected area. Verbal and written instructions for the use of and application of this item were provided.   The patient was educated and fit by a healthcare professional with expert knowledge and specialization in brace application. They were instructed to contact the office immediately should the equipment result in increased pain, decreased sensation, increased swelling or worsening of the condition.  XR FOOT RIGHT (MIN 3 VIEWS)     Standing Status:   Future     Number of Occurrences:   1     Standing Expiration Date:   5/24/2022     Order Specific Question:   Reason for exam:     Answer:   foot pain    XR ANKLE RIGHT (2 VIEWS)     Standing Status:   Future     Number of Occurrences:   1     Standing Expiration Date:   5/24/2022    Ambulatory referral to Physical Therapy     Referral Priority:   Routine     Referral Type:   Eval and Treat     Referral Reason:   Specialty Services Required     Number of Visits Requested:   1    DJO Stabilizing Pro Speed Ankle Brace     Patient was prescribed a DJO Stabilizing Pro Speed Ankle Brace. The right ankle will require stabilization / immobilization from this semi-rigid / rigid orthosis to improve their function. The orthosis will assist in protecting the affected area, provide functional support and facilitate healing. Patient was instructed to progress ambulation weight bearing as tolerated in the device. The patient was educated and fit by a healthcare professional with expert knowledge and specialization in brace application while under the direct supervision of the treating physician. Verbal and written instructions for the use of and application of this item were provided. They were instructed to contact the office immediately should the brace result in increased pain, decreased sensation, increased swelling or worsening of the condition. Other Outside Imaging and Testing Personally Reviewed:    XR ANKLE RIGHT (2 VIEWS)    Result Date: 5/24/2021  Radiology exam is complete. No Radiologist dictation. Please follow up with ordering provider.      XR FOOT RIGHT (MIN 3 VIEWS)    Result Date: 5/24/2021  Radiology exam is complete. No Radiologist dictation. Please follow up with ordering provider. Page  3 of 3                Assessment   Impression: . Encounter Diagnoses   Name Primary?  Chondromalacia of right ankle Yes    Capsulitis of ankle, right     Ankle effusion, right               Plan: Active rest avoidance of impact activities for the next 5 to 7 days  Trial of steroids-Medrol Dosepak  Functional ankle bracing  Intra-articular steroid injection ultrasound-guided right ankle as needed    Local suspicion that his presentation is consistent with acute gouty arthritis. The nature of the finding, probable diagnosis and likely treatment was thoroughly discussed with the patient. The options, risks, complications, alternative treatment as well as some of the differential diagnosis was discussed. The patient was thoroughly informed and all questions were answered. the patient indicated understanding and satisfaction with the discussion. Orders:        Orders Placed This Encounter   Procedures    Breg Knee Sleeve $20     Patient was supplied a Knee Sleeve. This retail item was supplied to provide functional support and assist in protecting the affected area. Verbal and written instructions for the use of and application of this item were provided. The patient was educated and fit by a healthcare professional with expert knowledge and specialization in brace application. They were instructed to contact the office immediately should the equipment result in increased pain, decreased sensation, increased swelling or worsening of the condition.     XR FOOT RIGHT (MIN 3 VIEWS)     Standing Status:   Future     Number of Occurrences:   1     Standing Expiration Date:   5/24/2022     Order Specific Question:   Reason for exam:     Answer:   foot pain    XR ANKLE RIGHT (2 VIEWS)     Standing Status:   Future     Number of Occurrences:   1     Standing Expiration Date:   5/24/2022    Ambulatory referral to Physical Therapy     Referral Priority:   Routine     Referral Type:   Eval and Treat     Referral Reason:   Specialty Services Required     Number of Visits Requested:   1    DJO Stabilizing Pro Speed Ankle Brace     Patient was prescribed a DJO Stabilizing Pro Speed Ankle Brace. The right ankle will require stabilization / immobilization from this semi-rigid / rigid orthosis to improve their function. The orthosis will assist in protecting the affected area, provide functional support and facilitate healing. Patient was instructed to progress ambulation weight bearing as tolerated in the device. The patient was educated and fit by a healthcare professional with expert knowledge and specialization in brace application while under the direct supervision of the treating physician. Verbal and written instructions for the use of and application of this item were provided. They were instructed to contact the office immediately should the brace result in increased pain, decreased sensation, increased swelling or worsening of the condition. Disclaimer: \"This note was dictated with voice recognition software. Though review and correction are routine, we apologize for any errors. \"

## 2021-06-02 ENCOUNTER — OFFICE VISIT (OUTPATIENT)
Dept: PRIMARY CARE CLINIC | Age: 71
End: 2021-06-02
Payer: MEDICARE

## 2021-06-02 VITALS
OXYGEN SATURATION: 97 % | HEART RATE: 64 BPM | RESPIRATION RATE: 18 BRPM | BODY MASS INDEX: 28.66 KG/M2 | DIASTOLIC BLOOD PRESSURE: 76 MMHG | HEIGHT: 67 IN | TEMPERATURE: 96.9 F | SYSTOLIC BLOOD PRESSURE: 128 MMHG

## 2021-06-02 DIAGNOSIS — H61.20 IMPACTED CERUMEN, UNSPECIFIED LATERALITY: ICD-10-CM

## 2021-06-02 DIAGNOSIS — E79.0 ELEVATED BLOOD URIC ACID LEVEL: ICD-10-CM

## 2021-06-02 DIAGNOSIS — I10 ESSENTIAL HYPERTENSION: Primary | ICD-10-CM

## 2021-06-02 DIAGNOSIS — I50.22 CHRONIC SYSTOLIC HEART FAILURE (HCC): ICD-10-CM

## 2021-06-02 DIAGNOSIS — F41.1 GENERALIZED ANXIETY DISORDER: ICD-10-CM

## 2021-06-02 PROCEDURE — 99214 OFFICE O/P EST MOD 30 MIN: CPT | Performed by: INTERNAL MEDICINE

## 2021-06-02 RX ORDER — ALLOPURINOL 100 MG/1
100 TABLET ORAL DAILY
Qty: 30 TABLET | Refills: 3 | Status: SHIPPED | OUTPATIENT
Start: 2021-06-02 | End: 2022-05-17 | Stop reason: ALTCHOICE

## 2021-06-02 SDOH — ECONOMIC STABILITY: FOOD INSECURITY: WITHIN THE PAST 12 MONTHS, YOU WORRIED THAT YOUR FOOD WOULD RUN OUT BEFORE YOU GOT MONEY TO BUY MORE.: NEVER TRUE

## 2021-06-02 SDOH — ECONOMIC STABILITY: FOOD INSECURITY: WITHIN THE PAST 12 MONTHS, THE FOOD YOU BOUGHT JUST DIDN'T LAST AND YOU DIDN'T HAVE MONEY TO GET MORE.: NEVER TRUE

## 2021-06-02 ASSESSMENT — SOCIAL DETERMINANTS OF HEALTH (SDOH): HOW HARD IS IT FOR YOU TO PAY FOR THE VERY BASICS LIKE FOOD, HOUSING, MEDICAL CARE, AND HEATING?: NOT HARD AT ALL

## 2021-06-02 NOTE — PATIENT INSTRUCTIONS
-Continue same medications  -Start Allopurinol 100mg once daily for elevated uric acid  -purine restricted diet        Patient Education        Purine-Restricted Diet: Care Instructions  Your Care Instructions     Purines are substances that are found in some foods. Your body turns purines into uric acid. High levels of uric acid can cause gout, which is a form of arthritis that causes pain and inflammation in joints. You may be able to help control the amount of uric acid in your body by limiting high-purine foods in your diet. Follow-up care is a key part of your treatment and safety. Be sure to make and go to all appointments, and call your doctor if you are having problems. It's also a good idea to know your test results and keep a list of the medicines you take. How can you care for yourself at home? · Plan your meals and snacks around foods that are low in purines and are safe for you to eat. These foods include:  ? Green vegetables and tomatoes. ? Fruits. ? Whole-grain breads, rice, and cereals. ? Eggs, peanut butter, and nuts. ? Low-fat milk, cheese, and other milk products. ? Popcorn. ? Gelatin desserts, chocolate, cocoa, and cakes and sweets, in small amounts. · You can eat certain foods that are medium-high in purines, but eat them only once in a while. These foods include:  ? Legumes, such as dried beans and dried peas. You can have 1 cup cooked legumes each day. ? Asparagus, cauliflower, spinach, mushrooms, and green peas. ? Fish and seafood (other than very high-purine seafood). ? Oatmeal, wheat bran, and wheat germ. · Limit very high-purine foods, including:  ? Organ meats, such as liver, kidneys, sweetbreads, and brains. ? Meats, including mcelroy, beef, pork, and lamb. ? Game meats and any other meats in large amounts. ? Anchovies, sardines, herring, mackerel, and scallops. ? Gravy. ? Beer. Where can you learn more? Go to https://boogie.health-Life[facility]. org and sign in to your [facility] account. Enter F448 in the [facility] box to learn more about \"Purine-Restricted Diet: Care Instructions. \"     If you do not have an account, please click on the \"Sign Up Now\" link. Current as of: December 17, 2020               Content Version: 12.8  © 6500-7615 [employer]. Care instructions adapted under license by [facility] [place] ([facility]). If you have questions about a medical condition or this instruction, always ask your healthcare professional. [address] any warranty or liability for your use of this information.

## 2021-06-02 NOTE — PROGRESS NOTES
[facility]   Date ofBirth:  1950    Date of Visit:  6/2/2021    Chief Complaint   Patient presents with    Cerumen Impaction     uses debrox and bulb to clean ears and its not working     Other     Needs a BP machine and what is a good heart when riding a bike.  Anxiety    Gout       HPI  Patient complains of clogged ears. Patient states for a few weeks he has used Debrox and it fizzes and flushes and the wax comes out and he hears well. Patient states then his ears clog up again when he lies on either side at night and he can't seem to clear the clog. Patient has hypertension. Patient would like a good blood pressure machine to monitor his blood pressure. Patient takes Losartan 50mg po q day and Carvedilol 6.25mg po bid. Patient decreases salt. Patient rides a stationary bike. Patient has anxiety. Patient takes Xanax 1mg 1/2 tablet po q evening and 1 tablet po qhs. Patient's anxiety has been stable. Patient states he hasn't had any panic attacks recently. Patient states he has felt panic a couple times since last visit. Patient denies feeling nervous or jittery. Patient has gout and elevated uric acid. Patient takes Colchicine as needed. Patient denies recent gout attacks. Patient has chronic systolic heart failure ([address] Patient's CHF is stable. Patient sees Cardiology. Patient takes Torsemide, Spironolactone, Losartan, and Carvedilol. Patient states he is seeing Orthopedics for knee pain. Patient states he can't kneel due to knee pain. Patient is doing Physical Therapy. Review of Systems   Constitutional: Negative for activity change, appetite change, chills, fatigue, fever and unexpected weight change. HENT: Negative for congestion, postnasal drip, rhinorrhea and sore throat. Eyes: Negative for visual disturbance. Respiratory: Negative for cough, chest tightness, shortness of breath and wheezing.     Cardiovascular: Negative for chest pain, palpitations and leg swelling. Gastrointestinal: Negative for abdominal pain, constipation, diarrhea, nausea and vomiting. Genitourinary: Negative for dysuria, frequency and hematuria. Musculoskeletal: Positive for arthralgias. Negative for myalgias. Neurological: Negative for dizziness, syncope, light-headedness, numbness and headaches. Psychiatric/Behavioral: Negative for decreased concentration, dysphoric mood and sleep disturbance. The patient is nervous/anxious. Allergies   Allergen Reactions    Latex Rash    Contrast [Gadolinium Derivatives] Shortness Of Breath and Anxiety    Soap Rash     Outpatient Medications Marked as Taking for the 6/2/21 encounter (Office Visit) with Tiffany Berry MD   Medication Sig Dispense Refill    methylPREDNISolone (MEDROL, ALEXIA,) 4 MG tablet By mouth. 1 kit 0    diclofenac sodium (VOLTAREN) 1 % GEL Apply 2 to 4 g 3 times daily for 2 weeks then twice daily as needed thereafter 100 g 3    meloxicam (MOBIC) 15 MG tablet Take 1 tablet by mouth daily as needed for Pain 30 tablet 1    clobetasol (TEMOVATE) 0.05 % ointment Apply to affected areas of the skin twice daily until improved.  60 g 2    levothyroxine (SYNTHROID) 100 MCG tablet TAKE 1 TABLET BY MOUTH EVERY DAY 90 tablet 1    ALPRAZolam (XANAX) 1 MG tablet TAKE 1/2 TABLET ONCE A DAY AND ONE TABLET AT NIGHT 45 tablet 2    SYMBICORT 160-4.5 MCG/ACT AERO TAKE 2 PUFFS BY MOUTH TWICE A DAY 10.2 Inhaler 3    diclofenac sodium (VOLTAREN) 1 % GEL APPLY 2 G TOPICALLY 2 TIMES DAILY 100 g 3    atorvastatin (LIPITOR) 40 MG tablet TAKE 1 TABLET BY MOUTH EVERY DAY AT NIGHT 30 tablet 5    colchicine (COLCRYS) 0.6 MG tablet Take 1 tablet by mouth daily PRN      carvedilol (COREG) 6.25 MG tablet Take 1 tablet by mouth 2 times daily (with meals)      torsemide (DEMADEX) 20 MG tablet Take 2 tablets by mouth daily      losartan (COZAAR) 25 MG tablet Take 50 mg by mouth daily       spironolactone (ALDACTONE) 25 MG tablet Take 0.5 There is no distension. Palpations: Abdomen is soft. Tenderness: There is no abdominal tenderness. Musculoskeletal:      Cervical back: Neck supple. Right lower leg: No edema. Left lower leg: No edema. Lymphadenopathy:      Head:      Right side of head: No submandibular adenopathy. Left side of head: No submandibular adenopathy. Neurological:      Mental Status: He is alert. Psychiatric:         Mood and Affect: Mood normal.           No results found for this visit on 06/02/21.   Lab Review   Office Visit on 05/13/2021   Component Date Value    BASIC METABOLIC PANEL 42/96/9997 NA     Sodium 05/21/2021 140     Potassium 05/21/2021 4.2     Chloride 05/21/2021 104     CO2 05/21/2021 30.5     Anion Gap 05/21/2021 6*    POC Glucose 05/21/2021 104     BUN 05/21/2021 26*    CREATININE 05/21/2021 1.29     Calcium 05/21/2021 9.1     Age Time 05/21/2021 70     GFR Non- 05/21/2021 55     GFR  05/21/2021 67     BNP 05/21/2021 62    Office Visit on 03/04/2021   Component Date Value    Hemoglobin A1C 03/22/2021 5.8     eAG 03/22/2021 119.8     BNP 03/22/2021 169     Thyrotropin Releasing Ho* 03/22/2021 1.02     LIPID PANEL 03/22/2021 NA     Cholesterol, Total 03/22/2021 165     Triglycerides 03/22/2021 134     HDL 03/22/2021 62     LDL Direct 03/22/2021 76     VLDL 03/22/2021 27     PSA 03/22/2021 1.38     COMPREHENSIVE METABOLIC * 39/88/7278 NA     Sodium 03/22/2021 140     Potassium 03/22/2021 4.7     Chloride 03/22/2021 103     CO2 03/22/2021 32.1*    Anion Gap 03/22/2021 5*    POC Glucose 03/22/2021 89     BUN 03/22/2021 24*    CREATININE 03/22/2021 1.23     Calcium 03/22/2021 9.3     Albumin 03/22/2021 3.8     Total Protein 03/22/2021 6.8     Total Bilirubin 03/22/2021 0.62     Alkaline Phosphatase 03/22/2021 102     AST 03/22/2021 16*    ALT 03/22/2021 22     Age Time 03/22/2021 70     GFR Non- 03/22/2021 58     GFR  03/22/2021 70     Uric Acid, Serum 03/22/2021 8.5*   Orders Only on 02/26/2021   Component Date Value    BNP 02/26/2021 88     BASIC METABOLIC PANEL 52/88/3890 NA     Sodium 02/26/2021 139     Potassium 02/26/2021 4.4     Chloride 02/26/2021 102     CO2 02/26/2021 30.7     Anion Gap 02/26/2021 6*    POC Glucose 02/26/2021 95     BUN 02/26/2021 20     CREATININE 02/26/2021 1.20     Calcium 02/26/2021 9.7     Age Time 02/26/2021 70     GFR Non- 02/26/2021 60     GFR  02/26/2021 72          Assessment/Plan     1. Essential hypertension  -stable  -Continue same medications  -Low sodium diet  -Regular aerobic exercise  - Blood Pressure Monitoring (BLOOD PRESSURE MONITOR/M CUFF) MISC; Use to monitor blood pressure  Dispense: 1 each; Refill: 0    2. Impacted cerumen, unspecified laterality  -earwax removed    3. Generalized anxiety disorder  -stable  -Continue same medications    4. Elevated blood uric acid level  - no recent gout attacks  -Start allopurinol (ZYLOPRIM) 100 MG tablet; Take 1 tablet by mouth daily  Dispense: 30 tablet; Refill: 3  -purine restricted diet    5. Chronic systolic heart failure (HCC)  -stable  -Continue same medications  -Continue care per Cardiology         Discussed medications with patient, who voiced understanding of their use and indications. All questions answered. Return in about 2 months (around 8/2/2021) for Medicare [facility].

## 2021-06-03 ENCOUNTER — HOSPITAL ENCOUNTER (OUTPATIENT)
Dept: PHYSICAL THERAPY | Age: 71
Setting detail: THERAPIES SERIES
Discharge: HOME | End: 2021-06-03
Payer: MEDICARE

## 2021-06-03 NOTE — FLOWSHEET NOTE
Physical Therapy  Cancellation/No-show Note  Patient Name:  Gil Dietz  :  1950   Date:  6/3/2021  Cancelled visits to date: 0  No-shows to date: 1    For today's appointment patient:  []  Cancelled  []  Rescheduled appointment  []  No-show     Reason given by patient:  []  Patient ill  []  Conflicting appointment  []  No transportation    []  Conflict with work  []  No reason given  []  Other:     Comments:      Electronically signed by:  Mai Kan PT

## 2021-06-07 ENCOUNTER — OFFICE VISIT (OUTPATIENT)
Dept: ORTHOPEDIC SURGERY | Age: 71
End: 2021-06-07
Payer: MEDICARE

## 2021-06-07 VITALS — HEIGHT: 67 IN | WEIGHT: 182.98 LBS | BODY MASS INDEX: 28.72 KG/M2

## 2021-06-07 DIAGNOSIS — M22.42 CHONDROMALACIA OF LEFT PATELLA: ICD-10-CM

## 2021-06-07 DIAGNOSIS — M23.204 DEGENERATIVE TEAR OF MEDIAL MENISCUS, LEFT: ICD-10-CM

## 2021-06-07 DIAGNOSIS — M17.12 PRIMARY OSTEOARTHRITIS OF LEFT KNEE: Primary | ICD-10-CM

## 2021-06-07 PROCEDURE — 99214 OFFICE O/P EST MOD 30 MIN: CPT | Performed by: INTERNAL MEDICINE

## 2021-06-07 NOTE — PROGRESS NOTES
Chief Complaint:   Chief Complaint   Patient presents with    Knee Pain     left, still hurts, but tolerable, only doing 20 miles of cycling now, sleeve helps          History of Present Illness:       Patient is a 79 y.o. male returns follow up for the above complaint. The patient was last seen approximately 2 weeksago. The symptoms are improving since the last visit. The patient has had further testing for the problem. His ankle pain has resolved completely and he is very pleased with this    MRI completed in the interim as outlined below    He did not elect to take the course of Medrol that was prescribed for him. His right knee is overall improved but remains mildly problematic in general symptoms seem to localize to the anterior compartment and seem to follow patellofemoral provocative pattern he denies mechanical symptoms localizing to the medial joint line region    He denies    Active related swelling or resurfacing swelling about the knee    He completed 20 mile bike ride over the weekend with low-grade discomfort     Past Medical History:        Past Medical History:   Diagnosis Date    Allergic rhinitis     Anxiety     Asthma     [facility] obstructive [facility] disease) [facility]     Hyperlipidemia     Hypertension     Hypothyroidism     [facility]         Present Medications:         Current Outpatient Medications   Medication Sig Dispense Refill    allopurinol (ZYLOPRIM) 100 MG tablet Take 1 tablet by mouth daily 30 tablet 3    Blood Pressure Monitoring (BLOOD PRESSURE MONITOR/M CUFF) MISC Use to monitor blood pressure 1 each 0    methylPREDNISolone (MEDROL, ALEXIA,) 4 MG tablet By mouth.  1 kit 0    meloxicam (MOBIC) 15 MG tablet Take 1 tablet by mouth daily as needed for Pain 30 tablet 1    diclofenac sodium (VOLTAREN) 1 % GEL Apply 2 to 4 g 3 times daily for 2 weeks then twice daily as needed thereafter 100 g 3    clobetasol (TEMOVATE) 0.05 % ointment Apply to affected areas of the skin twice daily until improved. 60 g 2    levothyroxine (SYNTHROID) 100 MCG tablet TAKE 1 TABLET BY MOUTH EVERY DAY 90 tablet 1    SYMBICORT 160-4.5 MCG/ACT AERO TAKE 2 PUFFS BY MOUTH TWICE A DAY 10.2 Inhaler 3    diclofenac sodium (VOLTAREN) 1 % GEL APPLY 2 G TOPICALLY 2 TIMES DAILY 100 g 3    atorvastatin (LIPITOR) 40 MG tablet TAKE 1 TABLET BY MOUTH EVERY DAY AT NIGHT 30 tablet 5    colchicine (COLCRYS) 0.6 MG tablet Take 1 tablet by mouth daily PRN      carvedilol (COREG) 6.25 MG tablet Take 1 tablet by mouth 2 times daily (with meals)      torsemide (DEMADEX) 20 MG tablet Take 2 tablets by mouth daily      losartan (COZAAR) 25 MG tablet Take 50 mg by mouth daily       spironolactone (ALDACTONE) 25 MG tablet Take 0.5 tablets by mouth daily      potassium chloride (KLOR-CON M) 10 MEQ extended release tablet Take 1 tablet by mouth daily 30 tablet 3    albuterol (PROVENTIL) (2.5 MG/3ML) 0.083% nebulizer solution Take 2.5 mg by nebulization every 6 hours as needed for Wheezing or Shortness of Breath      nicotine (NICODERM CQ) 21 MG/24HR Place 1 patch onto the skin every 24 hours PRN      Multiple Vitamins-Minerals (CENTRUM SILVER ADULT 50+ PO) Take 1 tablet by mouth daily      aspirin 81 MG tablet Take 1 tablet by mouth daily. 30 tablet 5    albuterol (PROAIR HFA) 108 (90 BASE) MCG/ACT inhaler Inhale 2 puffs into the lungs every 6 hours as needed. No current facility-administered medications for this visit. Allergies: Allergies   Allergen Reactions    Latex Rash    Contrast [Gadolinium Derivatives] Shortness Of Breath and Anxiety    Soap Rash           Review of Systems:    Pertinent items are noted in HPI       Vital Signs: There were no vitals filed for this visit.      General Exam:     Constitutional: Patient is adequately groomed with no evidence of malnutrition    Physical Exam: left knee      Primary Exam:    Inspection: Resolution of intra-articular effusion      Palpation: No focal tenderness      Range of Motion: Full range and symmetric low-grade discomfort endrange flexion      Strength: Normal with SLR      Special Tests: Patellofemoral provocative negative, medial Val's stressing negative      Skin: There are no rashes, ulcerations or lesions. Gait: Nonantalgic    Neurovascular - non focal and intact       Additional Comments:        Additional Examinations:                    Office Imaging Results/Procedures PerformedToday:           Office Procedures:     Orders Placed This Encounter   Procedures    Ambulatory referral to Physical Therapy     Referral Priority:   Routine     Referral Type:   Eval and Treat     Referral Reason:   Specialty Services Required     Requested Specialty:   Physical Therapy     Number of Visits Requested:   1           Other Outside Imaging and Testing Personally Reviewed: This document is confidential information. Unauthorized disclosure of this information is    prohibited by law. If you are not the intended recipient, please advise us by calling    [phone].                                            RADIOLOGY REPORT        NAME                                           DATE                       ACCESSION #   Dolly Chirinos                              5/21/2021                  [id number]        EXAM CODE                          EXAM NAME    [id number]                            MRI KNEE LT W/O        [facility] #                         MRN                  AGE                 D. O.B.    [id number]                           [id number]               79                  1950            ATTENDING PHYS           SHREYA MARINO    ORDERING PHYS            SHREYA MARINO        UNSIGNED TRANSCRIPTIONS REPRESENT A PRELIMINARY REPORT AND DO NOT                 REPRESENT A MEDICAL OR LEGAL DOCUMENT            EXAMINATION:    MRI OF THE LEFT KNEE WITHOUT CONTRAST, 5/21/2021 9:54 am        TECHNIQUE: compartment chondromalacia with full-thickness fissure at the    inner weight-bearing lateral tibial plateau on image 10, series 8. Grade 3    medial patellofemoral chondromalacia with underlying subchondral reactive    marrow changes in the medial femoral trochlea.        BONE MARROW: Bone marrow signal intensity within the remaining visualized    osseous structures otherwise grossly unremarkable.        SOFT TISSUES: Small partially ruptured Baker's cyst. Mild to moderate edema    and fluid in the subcutaneous fat about the knee.        Visualized popliteal neurovascular bundle grossly unremarkable.        IMPRESSION:    1. Oblique undersurface tearing in the posterior horn and body of the medial    meniscus with partial outward extrusion of a torn degenerated medial meniscus    body towards the inferomedial gutter.    2. Grade 1 MCL sprain.    3. Mild superior and inferior patellar tendinosis. Mild distal quadriceps    tendinosis.    4. Moderate medial patellofemoral chondromalacia. Mild-to-moderate medial    compartment chondromalacia. Mild lateral compartment chondromalacia with    superimposed full-thickness fissure at the inner weight-bearing lateral    tibial plateau.               [facility]                             [address]                                                                                              Page    2 of 3    This document is confidential information. Unauthorized disclosure of this information is    prohibited by law. If you are not the intended recipient, please advise us by calling    [phone].        NAME                               DATE                 [facility] #              D. Georgi JENKINS                  5/21/2021            [id number]            1950            5. Small partially ruptured Baker's cyst. Mild to moderate edema and fluid    in the subcutaneous fat about the knee.    6. Degeneration of the lateral meniscus. No lateral meniscus tear.                Electronically Reviewed and Signed by: Tanner Falcon MD    5/21/2021 11:53:00 AM        Dictating Initials: vrm    Dictated Date: 5/21/2021 11:53:00 AM    Transcribed Date: 5/21/2021 11:39:24 AM    Transcribing Initials: hima        cc: [address]                   [address] [place] [address]                                                                                              Page    3 of 3               Assessment   Impression: . Encounter Diagnoses   Name Primary?  Primary osteoarthritis of left knee Yes    Chondromalacia of left patella     Degenerative tear of medial meniscus, left               Plan: Activity modification-PPP  Recommend bike fitting evaluation allow him to continue cycling without formal restriction  Consider hyaluronic acid therapy as needed-Durolane  Formal course of PT I HEP PPP  Continue topical NSAID Voltaren and OTC NSAIDs with GI precaution. I do not believe his meniscal pathology is clinically symptomatic at this time proceed as outlined above     Orders:        Orders Placed This Encounter   Procedures    Ambulatory referral to Physical Therapy     Referral Priority:   Routine     Referral Type:   Eval and Treat     Referral Reason:   Specialty Services Required     Requested Specialty:   Physical Therapy     Number of Visits Requested:   1         Callie Romeo MD.      Javier Cadena: \"This note was dictated with voice recognition software. Though review and correction are routine, we apologize for any errors. \"

## 2021-06-09 PROBLEM — E79.0 ELEVATED BLOOD URIC ACID LEVEL: Status: ACTIVE | Noted: 2021-06-09

## 2021-06-09 PROBLEM — H61.20 IMPACTED CERUMEN: Status: ACTIVE | Noted: 2021-06-09

## 2021-06-10 DIAGNOSIS — E78.2 MIXED HYPERLIPIDEMIA: ICD-10-CM

## 2021-06-10 RX ORDER — ATORVASTATIN CALCIUM 40 MG/1
TABLET, FILM COATED ORAL
Qty: 90 TABLET | Refills: 1 | Status: SHIPPED | OUTPATIENT
Start: 2021-06-10 | End: 2021-11-24

## 2021-06-10 ASSESSMENT — ENCOUNTER SYMPTOMS
CONSTIPATION: 0
COUGH: 0
SHORTNESS OF BREATH: 0
NAUSEA: 0
VOMITING: 0
SORE THROAT: 0
DIARRHEA: 0
ABDOMINAL PAIN: 0
CHEST TIGHTNESS: 0
WHEEZING: 0
RHINORRHEA: 0

## 2021-06-10 NOTE — TELEPHONE ENCOUNTER
Medication:   Requested Prescriptions     Pending Prescriptions Disp Refills    atorvastatin (LIPITOR) 40 MG tablet [Pharmacy Med Name: ATORVASTATIN 40 MG TABLET] 90 tablet 1     Sig: TAKE 1 TABLET BY MOUTH EVERY DAY AT NIGHT       Last Filled:      Patient Phone Number: [phone] (home)     Last appt: 6/2/2021   Next appt: 8/2/2021    Last Lipid:   Lab Results   Component Value Date    CHOL 165 03/22/2021    TRIG 134 03/22/2021    HDL 62 03/22/2021    [address] 06/03/2015       Last OARRS:   RX Monitoring 3/4/2021   Attestation -   Periodic Controlled Substance Monitoring No signs of potential drug abuse or diversion identified. Preferred Pharmacy:   [facility]/pharmacy #[id number]- [place], [place] - [address] [facility] [address] [phone] - F [phone]  [address]  [address] [facility]  Phone: [phone] Fax: [phone]    [facility] Pauline Petit [address] [place] [address]  Phone: [phone] Fax: [phone]
Medication:   Requested Prescriptions     Pending Prescriptions Disp Refills    atorvastatin (LIPITOR) 40 MG tablet [Pharmacy Med Name: ATORVASTATIN 40 MG TABLET] 90 tablet 1     Sig: TAKE 1 TABLET BY MOUTH EVERY DAY AT NIGHT     Last Filled:  11/27/20  Last appt: 6/2/2021   Next appt: 8/2/2021    Last OARRS:   RX Monitoring 3/4/2021   Attestation -   Periodic Controlled Substance Monitoring No signs of potential drug abuse or diversion identified.
History reviewed above  seizure increased and more prolonged x 3 days  phenobarb and carbamazepine levels- subtherapeutic  Neuro exam: not moving right arm as well as left;    will increase dose of Carbamazepine to 100 mg BID  admit for observation

## 2021-06-17 DIAGNOSIS — F41.1 GENERALIZED ANXIETY DISORDER: ICD-10-CM

## 2021-06-17 RX ORDER — ALPRAZOLAM 1 MG/1
TABLET ORAL
Qty: 45 TABLET | Refills: 2 | Status: SHIPPED | OUTPATIENT
Start: 2021-06-17 | End: 2021-10-04

## 2021-06-17 NOTE — TELEPHONE ENCOUNTER
Medication:   Requested Prescriptions     Pending Prescriptions Disp Refills    ALPRAZolam (XANAX) 1 MG tablet [Pharmacy Med Name: ALPRAZOLAM 1 MG TABLET] 45 tablet 2     Sig: TAKE 1/2 TABLET ONCE A DAY AND ONE TABLET AT NIGHT        Last Filled:      Patient Phone Number: [phone] (home)     Last appt: 6/2/2021   Next appt: 8/2/2021    Last OARRS:   RX Monitoring 3/4/2021   Attestation -   Periodic Controlled Substance Monitoring No signs of potential drug abuse or diversion identified. Preferred Pharmacy:   [facility]/pharmacy #[id number]- [place], [place] - [address] [facility] [phone] - F [phone]  [address]  [address] [facility]  Phone: [phone] Fax: [phone]    [facility] [address] [place] [address]  Phone: [phone] Fax: [phone]

## 2021-06-17 NOTE — TELEPHONE ENCOUNTER
Controlled Substance Monitoring:    Acute and Chronic Pain Monitoring:   RX Monitoring 6/17/2021   Attestation -   Periodic Controlled Substance Monitoring No signs of potential drug abuse or diversion identified.

## 2021-06-22 ENCOUNTER — HOSPITAL ENCOUNTER (OUTPATIENT)
Dept: PHYSICAL THERAPY | Age: 71
Setting detail: THERAPIES SERIES
Discharge: HOME | End: 2021-06-22
Payer: MEDICARE

## 2021-06-22 NOTE — FLOWSHEET NOTE
Physical Therapy  Cancellation/No-show Note  Patient Name:  Kishan Pate  :  1950   Date:  2021  Cancelled visits to date: 0  No-shows to date: 2    For today's appointment patient:  []  Cancelled  []  Rescheduled appointment  [x]  No-show     Reason given by patient:  []  Patient ill  []  Conflicting appointment  []  No transportation    []  Conflict with work  [x]  No reason given  []  Other:     Comments:      Electronically signed by:  Gilbert Huerta, PT, DPT

## 2021-07-26 ENCOUNTER — CARE COORDINATION (OUTPATIENT)
Dept: CARE COORDINATION | Age: 71
End: 2021-07-26

## 2021-07-26 NOTE — CARE COORDINATION
Placed first phone call to reach patient for the purpose of offering CM. He was not available. LM and provided contact information. Plan  Make second phone call    Wilberto Membreno.  Pauline Lemus RN, BSN, [address] [place] [address] Primary Care  [phone]

## 2021-07-29 ENCOUNTER — CARE COORDINATION (OUTPATIENT)
Dept: CARE COORDINATION | Age: 71
End: 2021-07-29

## 2021-07-29 DIAGNOSIS — J44.9 CHRONIC OBSTRUCTIVE PULMONARY DISEASE, UNSPECIFIED COPD TYPE (HCC): ICD-10-CM

## 2021-07-29 RX ORDER — BUDESONIDE AND FORMOTEROL FUMARATE DIHYDRATE 160; 4.5 UG/1; UG/1
AEROSOL RESPIRATORY (INHALATION)
Qty: 10.2 INHALER | Refills: 3 | Status: SHIPPED | OUTPATIENT
Start: 2021-07-29 | End: 2021-11-29

## 2021-07-29 NOTE — CARE COORDINATION
Placed second phone call to patient for the purpose of offering CM and he was not available. LM and provided contact information. Plan  Make third and final phone call    Garrett Harris.  Denise Vides RN, BSN, [address] [place] [address]  [phone]

## 2021-07-29 NOTE — TELEPHONE ENCOUNTER
Medication:   Requested Prescriptions     Pending Prescriptions Disp Refills    SYMBICORT 160-4.5 MCG/ACT AERO [Pharmacy Med Name: Russ Fontana 160-4.5 MCG INHALER] 10.2 Inhaler 3     Sig: TAKE 2 PUFFS BY MOUTH TWICE A DAY        Last Filled:      Patient Phone Number: [phone] (home)     Last appt: 6/2/2021   Next appt: 8/2/2021    Last OARRS:   RX Monitoring 6/17/2021   Attestation -   Periodic Controlled Substance Monitoring No signs of potential drug abuse or diversion identified. Preferred Pharmacy:   [facility]/pharmacy #[id number]- [place], [place] - [address] [facility] [phone] - F [phone]  [address]  [address] [facility]  Phone: [phone] Fax: [phone]    [facility] [address], Barbara Gottlieb  [address] [address]  Phone: [phone] Fax: [phone]

## 2021-08-02 ENCOUNTER — OFFICE VISIT (OUTPATIENT)
Dept: PRIMARY CARE CLINIC | Age: 71
End: 2021-08-02
Payer: MEDICARE

## 2021-08-02 ENCOUNTER — CARE COORDINATION (OUTPATIENT)
Dept: CARE COORDINATION | Age: 71
End: 2021-08-02

## 2021-08-02 VITALS
WEIGHT: 189.6 LBS | OXYGEN SATURATION: 97 % | RESPIRATION RATE: 16 BRPM | SYSTOLIC BLOOD PRESSURE: 112 MMHG | BODY MASS INDEX: 29.76 KG/M2 | HEIGHT: 67 IN | HEART RATE: 88 BPM | DIASTOLIC BLOOD PRESSURE: 74 MMHG | TEMPERATURE: 97.8 F

## 2021-08-02 DIAGNOSIS — E78.2 MIXED HYPERLIPIDEMIA: ICD-10-CM

## 2021-08-02 DIAGNOSIS — M10.079 IDIOPATHIC GOUT OF FOOT, UNSPECIFIED CHRONICITY, UNSPECIFIED LATERALITY: ICD-10-CM

## 2021-08-02 DIAGNOSIS — R73.03 PREDIABETES: ICD-10-CM

## 2021-08-02 DIAGNOSIS — E79.0 ELEVATED BLOOD URIC ACID LEVEL: ICD-10-CM

## 2021-08-02 DIAGNOSIS — Z00.00 ROUTINE GENERAL MEDICAL EXAMINATION AT A HEALTH CARE FACILITY: Primary | ICD-10-CM

## 2021-08-02 DIAGNOSIS — N18.31 STAGE 3A CHRONIC KIDNEY DISEASE (HCC): ICD-10-CM

## 2021-08-02 DIAGNOSIS — J44.9 CHRONIC OBSTRUCTIVE PULMONARY DISEASE, UNSPECIFIED COPD TYPE (HCC): ICD-10-CM

## 2021-08-02 DIAGNOSIS — K63.5 POLYP OF COLON, UNSPECIFIED PART OF COLON, UNSPECIFIED TYPE: ICD-10-CM

## 2021-08-02 DIAGNOSIS — I10 ESSENTIAL HYPERTENSION: ICD-10-CM

## 2021-08-02 DIAGNOSIS — F41.1 GENERALIZED ANXIETY DISORDER: ICD-10-CM

## 2021-08-02 DIAGNOSIS — E03.9 ACQUIRED HYPOTHYROIDISM: ICD-10-CM

## 2021-08-02 DIAGNOSIS — Z23 NEED FOR PROPHYLACTIC VACCINATION AND INOCULATION AGAINST VARICELLA: ICD-10-CM

## 2021-08-02 DIAGNOSIS — R53.83 FATIGUE, UNSPECIFIED TYPE: ICD-10-CM

## 2021-08-02 DIAGNOSIS — Z23 NEED FOR PROPHYLACTIC VACCINATION AGAINST DIPHTHERIA-TETANUS-PERTUSSIS (DTP): ICD-10-CM

## 2021-08-02 PROCEDURE — G0439 PPPS, SUBSEQ VISIT: HCPCS | Performed by: INTERNAL MEDICINE

## 2021-08-02 RX ORDER — ALBUTEROL SULFATE 90 UG/1
2 AEROSOL, METERED RESPIRATORY (INHALATION) EVERY 6 HOURS PRN
Qty: 1 INHALER | Refills: 5 | Status: SHIPPED | OUTPATIENT
Start: 2021-08-02

## 2021-08-02 ASSESSMENT — PATIENT HEALTH QUESTIONNAIRE - PHQ9
SUM OF ALL RESPONSES TO PHQ QUESTIONS 1-9: 2
2. FEELING DOWN, DEPRESSED OR HOPELESS: 1
SUM OF ALL RESPONSES TO PHQ9 QUESTIONS 1 & 2: 2
1. LITTLE INTEREST OR PLEASURE IN DOING THINGS: 1

## 2021-08-02 ASSESSMENT — LIFESTYLE VARIABLES
AUDIT-C TOTAL SCORE: 3
HOW OFTEN DURING THE LAST YEAR HAVE YOU FAILED TO DO WHAT WAS NORMALLY EXPECTED FROM YOU BECAUSE OF DRINKING: 0
AUDIT TOTAL SCORE: 3
HAS A RELATIVE, FRIEND, DOCTOR, OR ANOTHER HEALTH PROFESSIONAL EXPRESSED CONCERN ABOUT YOUR DRINKING OR SUGGESTED YOU CUT DOWN: 0
HOW MANY STANDARD DRINKS CONTAINING ALCOHOL DO YOU HAVE ON A TYPICAL DAY: 0
HOW OFTEN DURING THE LAST YEAR HAVE YOU BEEN UNABLE TO REMEMBER WHAT HAPPENED THE NIGHT BEFORE BECAUSE YOU HAD BEEN DRINKING: 0
HOW OFTEN DURING THE LAST YEAR HAVE YOU HAD A FEELING OF GUILT OR REMORSE AFTER DRINKING: 0
HOW OFTEN DO YOU HAVE A DRINK CONTAINING ALCOHOL: 3
HAVE YOU OR SOMEONE ELSE BEEN INJURED AS A RESULT OF YOUR DRINKING: 0
HOW OFTEN DURING THE LAST YEAR HAVE YOU NEEDED AN ALCOHOLIC DRINK FIRST THING IN THE MORNING TO GET YOURSELF GOING AFTER A NIGHT OF HEAVY DRINKING: 0
HOW OFTEN DO YOU HAVE SIX OR MORE DRINKS ON ONE OCCASION: 0
HOW OFTEN DURING THE LAST YEAR HAVE YOU FOUND THAT YOU WERE NOT ABLE TO STOP DRINKING ONCE YOU HAD STARTED: 0

## 2021-08-02 NOTE — LETTER
CONTROLLED SUBSTANCE MEDICATION AGREEMENT     Patient Name: Hattie Hagan  Patient YOB: 1950   I understand, that controlled substance medications may be used to help better manage my symptoms and to improve my ability to function at home, work and in social settings. However, I also understand that these medications do have risks, which have been discussed with me, including possible development of physical or psychological dependence. I understand that successful treatment requires mutual trust and honesty between me and my provider. I understand and agree that following this Medication Agreement is necessary in continuing my provider-patient relationship and the success of my treatment plan. Explanation from my Provider: Benefits and Goals of Controlled Substance Medications: There are two potential goals for your treatment: (1) decreased pain and suffering (2) improved daily life functions. There are many possible treatments for your chronic condition(s). Alternatives such as physical therapy, yoga, massage, home daily exercise, meditation, relaxation techniques, injections, chiropractic manipulations, surgery, cognitive therapy, hypnosis and many medications that are not habit-forming may be used. Use of controlled substance medications may be helpful, but they are unlikely to resolve all symptoms or restore all function. Explanation from my Provider: Risks of Controlled Substance Medications:  Opioid pain medications: These medications can lead to problems such as addiction/dependence, sedation, lightheadedness/dizziness, memory issues, falls, constipation, nausea, or vomiting. They may also impair the ability to drive or operate machinery. Additionally, these medications may lower testosterone levels, leading to loss of bone strength, stamina and sex drive.   They may cause problems with breathing, sleep apnea and reduced coughing, which is especially dangerous for patients with lung disease. Overdose or dangerous interactions with alcohol and other medications may occur, leading to death. Hyperalgesia may develop, which means patients receiving opioids for the treatment of pain may become more sensitive to certain painful stimuli, and in some cases, experience pain from ordinarily non-painful stimuli. Women between the ages of 14-53 who could become pregnant should carefully weigh the risks and benefits of opioids with their physicians, as these medications increase the risk of pregnancy complications, including miscarriage,  delivery and stillbirth. It is also possible for babies to be born addicted to opioids. Opioid dependence withdrawal symptoms may include; feelings of uneasiness, increased pain, irritability, belly pain, diarrhea, sweats and goose-flesh. Benzodiazepines and non-benzodiazepine sleep medications: These medications can lead to problems such as addiction/dependence, sedation, fatigue, lightheadedness, dizziness, incoordination, falls, depression, hallucinations, and impaired judgment, memory and concentration. The ability to drive and operate machinery may also be affected. Abnormal sleep-related behaviors have been reported, including sleepwalking, driving, making telephone calls, eating, or having sex while not fully awake. These medications can suppress breathing and worsen sleep apnea, particularly when combined with alcohol or other sedating medications, potentially leading to death. Dependence withdrawal symptoms may include tremors, anxiety, hallucinations and seizures. Stimulants:  Common adverse effects include addiction/dependence, increased blood  pressure and heart rate, decreased appetite, nausea, involuntary weight loss, insomnia,                                                                                                                     Initials:_______   irritability, and headaches.   These risks may increase when these medications are combined with other stimulants, such as caffeine pills or energy drinks, certain weight loss supplements and oral decongestants. Dependence withdrawal symptoms may include depressed mood, loss of interest, suicidal thoughts, anxiety, fatigue, appetite changes and agitation. Testosterone replacement therapy:  Potential side effects include increased risk of stroke and heart attack, blood clots, increased blood pressure, increased cholesterol, enlarged prostate, sleep apnea, irritability/aggression and other mood disorders, and decreased fertility. I agree and understand that I and my prescriber have the following rights and responsibilities regarding my treatment plan:     1. MY RIGHTS:  To be informed of my treatment and medication plan. To be an active participant in my health and wellbeing. 2. MY RESPONSIBILITY AND UNDERSTANDING FOR USE OF MEDICATIONS   I will take medications at the dose and frequency as directed. For my safety, I will not increase or change how I take my medications without the recommendation of my healthcare provider.  I will actively participate in any program recommended by my provider which may improve function, including social, physical, psychological programs.  I will not take my medications with alcohol or other drugs not prescribed to me. I understand that drinking alcohol with my medications increases the chances of side effects, including reduced breathing rate and could lead to personal injury when operating machinery.  I understand that if I have a history of substance use disorders, including alcohol or other illicit drugs, that I may be at increased risk of addiction to my medications.  I agree to notify my provider immediately if I should become pregnant so that my treatment plan can be adjusted.    I agree and understand that I shall only receive controlled substance medications from the prescriber that signed this agreement unless there is written agreement among other prescribers of controlled substances outlining the responsibility of the medications being prescribed.  I understand that the if the controlled medication is not helping to achieve goals, the dosage may be tapered and no longer prescribed. 3. MY RESPONSIBILITY FOR COMMUNICATION / PRESCRIPTION RENEWALS   I agree that all controlled substance medications that I take will be prescribed only by my provider. If another healthcare provider prescribes me medication in an emergency, I will notify my provider within seventy-two (72) hours.  I will arrange for refills at the prescribed interval ONLY during regular office hours. I will not ask for refills earlier than agreed, after-hours, on holidays or weekends. Refills may take up to 72 hours for processing and prescriptions to reach the pharmacy.  I will inform my other health care providers that I am taking these medications and of the existence of this [id number]. In the event of an emergency, I will provide the same information to the emergency department prescribers.  I will keep my provider updated on the pharmacy I am using for controlled medication prescription filling. Initials:_______  4. MY RESPONSIBILITY FOR PROTECTING MEDICATIONS   I will protect my prescriptions and medications. I understand that lost or misplaced prescriptions will not be replaced.  I will keep medications only for my own use and will not share them with others. I will keep all medications away from children.  I agree that if my medications are adjusted or discontinued, I will properly dispose of any remaining medications. I understand that I will be required to dispose of any remaining controlled medications as, directed by my prescriber, prior to being provided with any prescriptions for other controlled medications.   Medication drop box locations can be found at: [email or web address]    5. MY RESPONSIBILITY WITH ILLEGAL DRUGS    I will not use illegal or street drugs or another person's prescription medications not prescribed to me.  If there are identified addiction type symptoms, then referral to a program may be provided by my provider and I agree to follow through with this recommendation. 6. MY RESPONSIBILITY FOR COOPERATION WITH INVESTIGATIONS   I understand that my provider will comply with any applicable law and may discuss my use and/or possible misuse/abuse of controlled substances and alcohol, as appropriate, with any health care provider involved in my care, pharmacist, or legal authority.  I authorize my provider and pharmacy to cooperate fully with law enforcement agencies (as permitted by law) in the investigation of any possible misuse, sale, or other diversion of my controlled substances.  I agree to waive any applicable privilege or right of privacy or confidentiality with respect to these authorizations. 7. PROVIDERS RIGHT TO MONITOR FOR SAFETY: PRESCRIPTION MONITORING / DRUG TESTING   I consent to drug/toxicology screening and will submit to a drug screen upon my providers request to assure I am only taking the prescribed drugs for my safety monitoring. I understand that a drug screen is a laboratory test in which a sample of my urine, blood or saliva is checked to see what drugs I have been taking. This may entail an observed urine specimen, which means that a nurse or other health care provider may watch me provide urine, and I will cooperate if I am asked to provide an observed specimen.  I understand that my provider will check a copy of my State Prescription Monitoring Program () Report in order to safely prescribe medications.  Pill Counts: I consent to pill counts when requested.   I may be asked to bring all my prescribed controlled substance medications, in their original bottles, to all of my scheduled appointments. In addition, my provider may ask me to come to the practice at any time for a random pill count. 8. TERMINATION OF THIS AGREEMENT  For my safety, my prescriber has the right to stop prescribing controlled substance medications and may end this agreement. Initials:_______   Conditions that may result in termination of this agreement:  a. I do not show any improvement in pain, or my activity has not improved. b. I develop rapid tolerance or loss of improvement, as described in my treatment plan.  c. I develop significant side effects from the medication. d. My behavior is not consistent with the responsibilities outlined above, thereby causing safety concerns to continue prescribing controlled substance medications. e. I fail to follow the terms of this agreement. f. Other:____________________________       UNDERSTANDING THIS MEDICATION AGREEMENT:    I have read the above and have had all my questions answered. For chronic disease management, I know that my symptoms can be managed with many types of treatments. A chronic medication trial may be part of my treatment, but I must be an active participant in my care. Medication therapy is only one part of my symptom management plan. In some cases, there may be limited scientific evidence to support the chronic use of certain medications to improve symptoms and daily function. Furthermore, in certain circumstances, there may be scientific information that suggests that the use of chronic controlled substances may worsen my symptoms and increase my risk of unintentional death directly related to this medication therapy. I know that if my provider feels my risk from controlled medications is greater than my benefit, I will have my controlled substance medication(s) compassionately lowered or removed altogether.      I further agree to allow this office to

## 2021-08-02 NOTE — CARE COORDINATION
Ambulatory Care Coordination Note  8/2/2021  CM Risk Score: 4  Charlson 10 Year Mortality Risk Score: 100%     ACC: Dorcas Larsen. Geovanni Ayon RN    Summary Note: Received incoming phone call from patient. Introduced myself and purpose of my phone call. Provided CM information and offered enrollment. He accepted. He is single and lives alone in large home with 4 bedrooms. He is independent with ADLS and IADLs. He is driving and does not use any assistive devices. He bicycles almost daily and as much as 50 miles sometimes. He attends [employer] in [place] and lives in [place]. 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 He has traveled extensively. His only living relative a brother works in [place] in the [employer].     Patient shares his medical hx. He said he had been dx with \"terminal cancer\" and had surgery while at [facility]. He was able to go into remission. He is thankful. Patient said that he lost 5 family members and friends. That was a very difficult time in his life. He then developed ETOH abuse but was able to overcome it. He says he has a very strong shelby. He attends an [employer]. Allowed him to vent and offered support. Patient says he is at his baseline COPD and manages it with inhalers. He is careful with outdoors and bike riding. He says he takes his time. Patient does not restrict fluids. He weights himself daily. He does not have any peripheral edema. Provided my contact information. Plan  Obtain approval from Dr. Soraya Clement  Enroll into CM  Completed FR and CC protocol  Complete SDOH, goals and med review  FU on CHF and COPD    Griselda Ayon, RN, BSN, [address] [place] [address] Primary Care     [phone]      Ambulatory Care Coordination Assessment    Care Coordination Protocol  Program Enrollment: Complex Care  Referral from Primary Care Provider: No  Week 1 - Initial Assessment     Do you have all of your prescriptions and are they filled?: Yes  Barriers to medication adherence: None  Are you able to afford your medications?: Yes  How often do you have trouble taking your medications the way you have been told to take them?: I always take them as prescribed. Do you have Home O2 Therapy?: No      Ability to seek help/take action for Emergent Urgent situations i.e. fire, crime, inclement weather or health crisis. : Independent  Ability to ambulate to restroom: Independent  Ability handle personal hygeine needs (bathing/dressing/grooming): Independent  Ability to manage Medications: Independent  Ability to prepare Food Preparation: Independent  Ability to maintain home (clean home, laundry): Independent  Ability to drive and/or has transportation: Independent  Ability to do shopping: Independent  Ability to manage finances:  Independent  Is patient able to live independently?: Yes     Current Housing: Private Residence        Per the Fall Risk Screening, did the patient have 2 or more falls or 1 fall with injury in the past year?: No     Frequent urination at night?: No  Do you use rails/bars?: No  Do you have a non-slip tub mat?: No     Are you experiencing loss of meaning?: No  Are you experiencing loss of hope and peace?: No     Thinking about your patient's physical health needs, are there any symptoms or problems (risk indicators) you are unsure about that require further investigation?: No identified areas of uncertainly or problems already being investigated   Are the patients physical health problems impacting on their mental well-being?: No identified areas of concern   Are there any problems with your patients lifestyle behaviors (alcohol, drugs, diet, exercise) that are impacting on physical or mental well-being?: No Provider   Tdap (ADACEL) 5-2-15.5 LF-MCG/0.5 injection Inject 0.5 mLs into the muscle once for 1 dose 8/2/21 8/2/21  Ramy Smith MD   zoster recombinant adjuvanted vaccine [facility]) 50 MCG/0.5ML SUSR injection Inject 0.5 mLs into the muscle once for 1 dose 8/2/21 8/2/21  Ramy Smith MD   albuterol sulfate HFA (PROAIR HFA) 108 (90 Base) MCG/ACT inhaler Inhale 2 puffs into the lungs every 6 hours as needed for Wheezing or Shortness of Breath 8/2/21   Ramy Smith MD   SYMBICORT 160-4.5 MCG/ACT AERO TAKE 2 PUFFS BY MOUTH TWICE A DAY 7/29/21   Ramy Smith MD   ALPRAZolam Saddie Mckinley) 1 MG tablet TAKE 1/2 TABLET ONCE A DAY AND ONE TABLET AT NIGHT 6/17/21 9/17/21  Ramy Smith MD   atorvastatin (LIPITOR) 40 MG tablet TAKE 1 TABLET BY MOUTH EVERY DAY AT NIGHT 6/10/21   Ramy Smith MD   allopurinol (ZYLOPRIM) 100 MG tablet Take 1 tablet by mouth daily 6/2/21   Ramy Smith MD   Blood Pressure Monitoring (BLOOD PRESSURE MONITOR/M CUFF) MISC Use to monitor blood pressure 6/2/21   Ramy Smith MD   diclofenac sodium (VOLTAREN) 1 % GEL Apply 2 to 4 g 3 times daily for 2 weeks then twice daily as needed thereafter 5/18/21   Bernadine Adam MD   clobetasol (TEMOVATE) 0.05 % ointment Apply to affected areas of the skin twice daily until improved.  5/4/21   Jenniffer Beebe MD   levothyroxine (SYNTHROID) 100 MCG tablet TAKE 1 TABLET BY MOUTH EVERY DAY 3/22/21   Ramy Smith MD   diclofenac sodium (VOLTAREN) 1 % GEL APPLY 2 G TOPICALLY 2 TIMES DAILY 2/1/21   Ramy Smith MD   colchicine (COLCRYS) 0.6 MG tablet Take 1 tablet by mouth daily PRN 6/17/19   Historical Provider, MD   carvedilol (COREG) 6.25 MG tablet Take 1 tablet by mouth 2 times daily (with meals) 5/6/19   Ramy Smith MD   torsemide (DEMADEX) 20 MG tablet Take 2 tablets by mouth daily 5/6/19   Ramy Smith MD   losartan (COZAAR) 25 MG tablet Take 50 mg by mouth daily  5/6/19   Rayo BUSTOS Marisela Escalante MD   spironolactone (ALDACTONE) 25 MG tablet Take 0.5 tablets by mouth daily 5/6/19   Lionel Goodman MD   potassium chloride (KLOR-CON M) 10 MEQ extended release tablet Take 1 tablet by mouth daily 4/4/18   ANTONIO Hurtado - CNP   albuterol (PROVENTIL) (2.5 MG/3ML) 0.083% nebulizer solution Take 2.5 mg by nebulization every 6 hours as needed for Wheezing or Shortness of Breath    Historical Provider, MD   nicotine (NICODERM CQ) 21 MG/24HR Place 1 patch onto the skin every 24 hours PRN    Historical Provider, MD   Multiple Vitamins-Minerals (CENTRUM SILVER ADULT 50+ PO) Take 1 tablet by mouth daily    Historical Provider, MD   aspirin 81 MG tablet Take 1 tablet by mouth daily. 7/25/13   Lionel Goodman MD   albuterol (PROAIR HFA) 108 (90 BASE) MCG/ACT inhaler Inhale 2 puffs into the lungs every 6 hours as needed.       Historical Provider, MD       Future Appointments   Date Time Provider Vicky Leann   8/17/2021 12:00 PM Jackee Gosselin, MD [facility]   11/2/2021  9:45 AM Lionel Goodman MD [facility]   11/10/2021 10:45 AM Jackee Gosselin, MD [facility]     ,   Congestive Heart Failure Assessment    Are you currently restricting fluids?: No Restriction  Do you understand a low sodium diet?: Yes  Do you understand how to read food labels?: Yes  How many restaurant meals do you eat per week?: 0  Do you salt your food before tasting it?: No     No patient-reported symptoms      Symptoms:        ,   COPD Assessment    Does the patient understand envrionmental exposure?: Yes  Is the patient able to verbalize Rescue vs. Long Acting medications?: Yes  Does the patient have a nebulizer?: No  Does the patient use a space with inhaled medications?: No     No patient-reported symptoms         Symptoms:         and   General Assessment    Do you have any symptoms that are causing concern?: No

## 2021-08-02 NOTE — PROGRESS NOTES
Medicare Annual Wellness Visit  Name: Oj Mensah Date: 2021   MRN: [id number] Sex: Male   Age: 79 y.o. Ethnicity: Non- / Non    : 1950 Race: White (non-)      Yasmine Granados is here for Medicare AWV    Screenings for behavioral, psychosocial and functional/safety risks, and cognitive dysfunction are all negative except as indicated below. These results, as well as other patient data from the [address] form, are documented in Flowsheets linked to this Encounter.       Patient has hypertension. Patient takes Losartan 50mg po q day and Carvedilol 6.25mg po bid. Patient monitors his BP and it averages 122-126/70-80. Patient decreases salt. Patient bicycles 20-50 miles per day.     Patient has hypothyroidism. Patient takes Levothyroxine 100mcg once daily. Patient denies constipation, weight gain, cold intolerance, and dry skin. Patient complains of fatigue. Patient has hyperlipidemia. Patient takes Atorvastatin 40mg nightly. Patient decreases fat and cholesterol in his diet. Patient has anxiety. Patient takes Xanax 1mg 1/2 tablet po q evening and 1 tablet po qhs. Patient states he hasn't had any panic attacks since last visit.     Patient has prediabetes. Patient decreases carbohydrates. Patient has gout and elevated uric acid. Patient takes Colchicine as needed. Patient denies recent gout attacks. Patient decreases red meat and shellfish. Patient has COPD. Patient states his breathing is pretty good. Patient is biking. Patient denies SOB and wheezing.      Patient has chronic kidney disease. Patient takes diuretics for CHF. Allergies   Allergen Reactions    Latex Rash    Contrast [Gadolinium Derivatives] Shortness Of Breath and Anxiety    Soap Rash         Prior to Visit Medications    Medication Sig Taking?  Authorizing Provider   albuterol sulfate HFA (PROAIR HFA) 108 (90 Base) MCG/ACT inhaler Inhale 2 puffs into the lungs every 6 hours as needed for Wheezing or Shortness of Breath Yes Gege Urbano MD   SYMBICORT 160-4.5 MCG/ACT AERO TAKE 2 PUFFS BY MOUTH TWICE A DAY Yes Gege Urbano MD   ALPRAZolam (XANAX) 1 MG tablet TAKE 1/2 TABLET ONCE A DAY AND ONE TABLET AT NIGHT Yes Gege Urbano MD   atorvastatin (LIPITOR) 40 MG tablet TAKE 1 TABLET BY MOUTH EVERY DAY AT NIGHT Yes Gege Urbano MD   allopurinol (ZYLOPRIM) 100 MG tablet Take 1 tablet by mouth daily Yes Gege Urbano MD   Blood Pressure Monitoring (BLOOD PRESSURE MONITOR/M CUFF) MISC Use to monitor blood pressure Yes Gege Urbano MD   diclofenac sodium (VOLTAREN) 1 % GEL Apply 2 to 4 g 3 times daily for 2 weeks then twice daily as needed thereafter Yes Krystal Bourgeois MD   clobetasol (TEMOVATE) 0.05 % ointment Apply to affected areas of the skin twice daily until improved.  Yes Sumi Rosado MD   levothyroxine (SYNTHROID) 100 MCG tablet TAKE 1 TABLET BY MOUTH EVERY DAY Yes Gege Urbano MD   diclofenac sodium (VOLTAREN) 1 % GEL APPLY 2 G TOPICALLY 2 TIMES DAILY Yes Gege Urbano MD   colchicine (COLCRYS) 0.6 MG tablet Take 1 tablet by mouth daily PRN Yes Historical Provider, MD   carvedilol (COREG) 6.25 MG tablet Take 1 tablet by mouth 2 times daily (with meals) Yes Gege Urbano MD   torsemide (DEMADEX) 20 MG tablet Take 2 tablets by mouth daily Yes Gege Urbano MD   losartan (COZAAR) 25 MG tablet Take 50 mg by mouth daily  Yes Gege Urbano MD   spironolactone (ALDACTONE) 25 MG tablet Take 0.5 tablets by mouth daily Yes Gege Urbano MD   potassium chloride (KLOR-CON M) 10 MEQ extended release tablet Take 1 tablet by mouth daily Yes ANTONIO Trujillo CNP   albuterol (PROVENTIL) (2.5 MG/3ML) 0.083% nebulizer solution Take 2.5 mg by nebulization every 6 hours as needed for Wheezing or Shortness of Breath Yes Historical Provider, MD   nicotine (NICODERM CQ) 21 MG/24HR Place 1 patch onto the skin every 24 hours PRN Yes Historical Provider, MD   Multiple Vitamins-Minerals (CENTRUM SILVER ADULT 50+ PO) Take 1 tablet by mouth daily Yes Historical Provider, MD   aspirin 81 MG tablet Take 1 tablet by mouth daily. Yes Lakshmi Brock MD   albuterol (PROAIR HFA) 108 (90 BASE) MCG/ACT inhaler Inhale 2 puffs into the lungs every 6 hours as needed. Yes Historical Provider, MD         Past Medical History:   Diagnosis Date    Allergic rhinitis     Anxiety     Asthma     [facility] [facility]     Hyperlipidemia     Hypertension     Hypothyroidism     Soft tissue sarcoma ([facility] [address]        Past Surgical History:   Procedure Laterality Date    COLONOSCOPY  8./16/2007    BN - 8 year f/u    SINUS SURGERY      SKIN CANCER EXCISION           Family History   Problem Relation Age of Onset    Diabetes Mother     Cancer Mother         lung    Cancer Father         bladder    Diabetes Sister     Diabetes Brother     Heart Disease Paternal Grandmother     Cancer Paternal Grandmother         leukemia       CareTeam (Including outside providers/suppliers regularly involved in providing care):   Patient Care Team:  Lakshmi Brock MD as PCP - General (Internal Medicine)  Lakshmi Brock MD as PCP - [facility] Empaneled Provider  Juan Reyna MD as Consulting Physician (Dermatology)  Jorden Lesch, MD as Surgeon (Orthopedic Surgery)  La Etienne MD as Consulting Physician  Foster Ramos RN as [facility]    Wt Readings from Last 3 Encounters:   08/02/21 189 lb 9.6 oz (86 kg)   06/07/21 182 lb 15.7 oz (83 kg)   05/24/21 183 lb (83 kg)     Vitals:    08/02/21 0945   BP: 112/74   Pulse: 88   Resp: 16   Temp: 97.8 °F (36.6 °C)   SpO2: 97%   Weight: 189 lb 9.6 oz (86 kg)   Height: 5' 7\" (1.702 m)     Body mass index is 29.7 kg/m². Based upon direct observation of the patient, evaluation of cognition reveals recent and remote memory intact.     General Appearance: alert and oriented to person, place and time, well-developed and well-nourished, in no acute distress  Head: normocephalic and atraumatic  Eyes: pupils equal, round, and reactive to light, extraocular eye movements intact, conjunctivae normal  ENT: tympanic membrane, external ear and ear canal normal bilaterally  Neck: neck supple and non tender without mass, no thyromegaly or thyroid nodules, no cervical lymphadenopathy   Pulmonary/Chest: clear to auscultation bilaterally- no wheezes, rales or rhonchi, normal air movement, no respiratory distress  Cardiovascular: normal rate, regular rhythm, normal S1 and S2, no murmurs and no carotid bruits  Abdomen: soft, non-tender, non-distended, normal bowel sounds, no masses or organomegaly  Extremities: no edema  Neurologic: gait and coordination normal and speech normal    Patient's complete Health Risk Assessment and screening values have been reviewed and are found in Flowsheets. The following problems were reviewed today and where indicated follow up appointments were made and/or referrals ordered. Positive Risk Factor Screenings with Interventions:            General Health and ACP:  General  In general, how would you say your health is?: Good  In the past 7 days, have you experienced any of the following?  New or Increased Pain, New or Increased Fatigue, Loneliness, Social Isolation, Stress or Anger?: (!) Stress, Social Isolation, Loneliness, New or Increased Fatigue, New or Increased Pain  Do you get the social and emotional support that you need?: (!) No  Do you have a Living Will?: Yes  Advance Directives     Power of  Living Will ACP-Advance Directive ACP-Power of     Not on File [place] on 10/28/16 Filed [address] [place] [address] [place] Risk Interventions:  · Pain issues: patient declines any further evaluation/treatment for this issue  · Fatigue: labs ordered- CBC auto differential, BMP, TSH, and vitamin B12, recommend multivitamin once daily, medication prescribed- 11/03/2015    Influenza, High Dose (Fluzone 65 yrs and older) 11/03/2015, 09/17/2018    Influenza, Quadv, adjuvanted, 65 yrs +, IM, PF (Fluad) 11/09/2020    Influenza, Triv, inactivated, subunit, adjuvanted, IM (Fluad 65 yrs and older) 11/13/2019    Pneumococcal Conjugate 13-valent ([id number]) 11/03/2015    Pneumococcal Polysaccharide ([id number]) 10/01/2015    Tetanus 08/01/2011    Tetanus Toxoid, absorbed 08/01/2011        Health Maintenance   Topic Date Due    DTaP/Tdap/Td vaccine (1 - Tdap) Never done    Shingles Vaccine (1 of 2) Never done    Colon cancer screen colonoscopy  07/03/2018    Annual Wellness Visit (AWV)  Never done    Flu vaccine (1) 09/01/2021    A1C test (Diabetic or Prediabetic)  03/22/2022    Lipid screen  03/22/2022    TSH testing  03/22/2022    Potassium monitoring  05/21/2022    Creatinine monitoring  05/21/2022    Pneumococcal 65+ years Vaccine  Completed    COVID-19 Vaccine  Completed    AAA screen  Completed    Hepatitis C screen  Completed    Hepatitis A vaccine  Aged Out    Hepatitis B vaccine  Aged Out    Hib vaccine  Aged Out    Meningococcal (ACWY) vaccine  Aged Out     Recommendations for [employer] Due: see orders and patient instructions/AVS.  .   Recommended screening schedule for the next 5-10 years is provided to the patient in written form: see Patient Instructions/AVS.    Lab Review   Office Visit on 05/13/2021   Component Date Value    BASIC METABOLIC PANEL 81/20/9911 NA     Sodium 05/21/2021 140     Potassium 05/21/2021 4.2     Chloride 05/21/2021 104     CO2 05/21/2021 30.5     Anion Gap 05/21/2021 6*    POC Glucose 05/21/2021 104     BUN 05/21/2021 26*    CREATININE 05/21/2021 1.29     Calcium 05/21/2021 9.1     Age Time 05/21/2021 70     GFR Non- 05/21/2021 55     GFR  05/21/2021 67     BNP 05/21/2021 62    Office Visit on 03/04/2021   Component Date Value    Hemoglobin A1C 03/22/2021 5.8  eAG 03/22/2021 119.8     BNP 03/22/2021 169     Thyrotropin Releasing Ho* 03/22/2021 1.02     LIPID PANEL 03/22/2021 NA     Cholesterol, Total 03/22/2021 165     Triglycerides 03/22/2021 134     HDL 03/22/2021 62     LDL Direct 03/22/2021 76     VLDL 03/22/2021 27     PSA 03/22/2021 1.38     COMPREHENSIVE METABOLIC * 99/09/5654 NA     Sodium 03/22/2021 140     Potassium 03/22/2021 4.7     Chloride 03/22/2021 103     CO2 03/22/2021 32.1*    Anion Gap 03/22/2021 5*    POC Glucose 03/22/2021 89     BUN 03/22/2021 24*    CREATININE 03/22/2021 1.23     Calcium 03/22/2021 9.3     Albumin 03/22/2021 3.8     Total Protein 03/22/2021 6.8     Total Bilirubin 03/22/2021 0.62     Alkaline Phosphatase 03/22/2021 102     AST 03/22/2021 16*    ALT 03/22/2021 22     Age Time 03/22/2021 70     GFR Non- 03/22/2021 58     GFR  03/22/2021 70     Uric Acid, Serum 03/22/2021 8.5*   Orders Only on 02/26/2021   Component Date Value    BNP 02/26/2021 88     BASIC METABOLIC PANEL 71/41/8355 NA     Sodium 02/26/2021 139     Potassium 02/26/2021 4.4     Chloride 02/26/2021 102     CO2 02/26/2021 30.7     Anion Gap 02/26/2021 6*    POC Glucose 02/26/2021 95     BUN 02/26/2021 20     CREATININE 02/26/2021 1.20     Calcium 02/26/2021 9.7     Age Time 02/26/2021 70     GFR Non- 02/26/2021 60     GFR  02/26/2021 72        Chengkaren Estevan was seen today for [facility]. Diagnoses and all orders for this visit:    1. Routine general medical examination at a health care facility  -Medicare AWV done    2. Need for prophylactic vaccination against diphtheria-tetanus-pertussis (DTP)  -Pt given Rx for Tdap vaccine to have done at their pharmacy  - Tdap (ADACEL) 5-2-15.5 LF-MCG/0.5 injection; Inject 0.5 mLs into the muscle once for 1 dose  Dispense: 0.5 mL; Refill: 0    3.  Need for prophylactic vaccination and inoculation against varicella  -Patient given prescription for Shingrix vaccine to have done at their pharmacy  - zoster recombinant adjuvanted vaccine [facility]) 50 MCG/0.5ML SUSR injection; Inject 0.5 mLs into the muscle once for 1 dose  Dispense: 0.5 mL; Refill: 1    4. Generalized anxiety disorder  -stable  -Continue same medications    5. Essential hypertension  -stable  -Continue same medications  -Low sodium diet  -Regular aerobic exercise  - Comprehensive Metabolic Panel; Future    6. Mixed hyperlipidemia  -Continue same medications  -Low fat, low cholesterol diet  -Regular aerobic exercise  - Comprehensive Metabolic Panel; Future  - Lipid Panel; Future    7. Acquired hypothyroidism  -stable  -Continue same medications  - TSH without Reflex; Future    8. Prediabetes  -Low carbohydrate diet  -Regular aerobic exercise  - Hemoglobin A1C; Future    9. Elevated blood uric acid level  -stable  - Uric Acid; Future    10. Idiopathic gout of foot, unspecified chronicity, unspecified laterality  -stable  -no gout attacks  - Uric Acid; Future    11. Stage 3a chronic kidney disease ([facility] [address]  - Comprehensive Metabolic Panel; Future  -Avoid NSAID's such as otc Ibuprofen, Advil, Motrin, Naprosyn, and Aleve as well as prescription NSAID's    12. Chronic obstructive pulmonary disease, unspecified COPD type ([facility] [address]  -stable  -Continue same medications  - albuterol sulfate HFA (PROAIR HFA) 108 (90 Base) MCG/ACT inhaler; Inhale 2 puffs into the lungs every 6 hours as needed for Wheezing or Shortness of Breath  Dispense: 1 Inhaler; Refill: 5    13. Fatigue, unspecified type  - Comprehensive Metabolic Panel; Future  - CBC Auto Differential; Future  - Vitamin B12; Future  -multivitamin once daily   -Regular aerobic exercise    14.  Polyp of colon, unspecified part of colon, unspecified type  -Referral to  ROZINA Romeo MD, Gastroenterology, [facility] for colonoscopy        Return in 3 months (on 11/2/2021) for anxiety, hypertension, COPD, chronic kidney disease.

## 2021-08-02 NOTE — PATIENT INSTRUCTIONS
Personalized Preventive Plan for Herb Oh - 8/2/2021  Medicare offers a range of preventive health benefits. Some of the tests and screenings are paid in full while other may be subject to a deductible, co-insurance, and/or copay. Some of these benefits include a comprehensive review of your medical history including lifestyle, illnesses that may run in your family, and various assessments and screenings as appropriate. After reviewing your medical record and screening and assessments performed today your provider may have ordered immunizations, labs, imaging, and/or referrals for you. A list of these orders (if applicable) as well as your Preventive Care list are included within your After Visit Summary for your review. Other Preventive Recommendations:    · A preventive eye exam performed by an eye specialist is recommended every 1-2 years to screen for glaucoma; cataracts, macular degeneration, and other eye disorders. · A preventive dental visit is recommended every 6 months. · Try to get at least 150 minutes of exercise per week or 10,000 steps per day on a pedometer . · Order or download the FREE \"Exercise & Physical Activity: Your Everyday Guide\" from [employer]. Call 7-317.849.5721 or search [employer] online. · You need 0436-1543 mg of calcium and 0076-8024 IU of vitamin D per day. It is possible to meet your calcium requirement with diet alone, but a vitamin D supplement is usually necessary to meet this goal.  · When exposed to the sun, use a sunscreen that protects against both UVA and UVB radiation with an SPF of 30 or greater. Reapply every 2 to 3 hours or after sweating, drying off with a towel, or swimming. · Always wear a seat belt when traveling in a car. Always wear a helmet when riding a bicycle or motorcycle. Personalized Preventive Plan for Herb Oh - 8/2/2021  Medicare offers a range of preventive health benefits.  Some of the tests and screenings are paid in full while other may be subject to a deductible, co-insurance, and/or copay. Some of these benefits include a comprehensive review of your medical history including lifestyle, illnesses that may run in your family, and various assessments and screenings as appropriate. After reviewing your medical record and screening and assessments performed today your provider may have ordered immunizations, labs, imaging, and/or referrals for you. A list of these orders (if applicable) as well as your Preventive Care list are included within your After Visit Summary for your review. Other Preventive Recommendations:    A preventive eye exam performed by an eye specialist is recommended every 1-2 years to screen for glaucoma; cataracts, macular degeneration, and other eye disorders. A preventive dental visit is recommended every 6 months. Try to get at least 150 minutes of exercise per week or 10,000 steps per day on a pedometer . Order or download the FREE \"Exercise & Physical Activity: Your Everyday Guide\" from [employer]. Call 4-378.731.1286 or search [employer] online. You need 8875-3423 mg of calcium and 0541-2785 IU of vitamin D per day. It is possible to meet your calcium requirement with diet alone, but a vitamin D supplement is usually necessary to meet this goal.  When exposed to the sun, use a sunscreen that protects against both UVA and UVB radiation with an SPF of 30 or greater. Reapply every 2 to 3 hours or after sweating, drying off with a towel, or swimming. Always wear a seat belt when traveling in a car. Always wear a helmet when riding a bicycle or motorcycle. Patient Education        Anxiety Disorder: Care Instructions  Your Care Instructions     Anxiety is a normal reaction to stress. Difficult situations can cause you to have symptoms such as sweaty palms and a nervous feeling.   In an anxiety disorder, the symptoms are far more severe. Constant worry, muscle tension, trouble sleeping, nausea and diarrhea, and other symptoms can make normal daily activities difficult or impossible. These symptoms may occur for no reason, and they can affect your work, school, or social life. Medicines, counseling, and self-care can all help. Follow-up care is a key part of your treatment and safety. Be sure to make and go to all appointments, and call your doctor if you are having problems. It's also a good idea to know your test results and keep a list of the medicines you take. How can you care for yourself at home? Take medicines exactly as directed. Call your doctor if you think you are having a problem with your medicine. Go to your counseling sessions and follow-up appointments. Recognize and accept your anxiety. Then, when you are in a situation that makes you anxious, say to yourself, \"This is not an emergency. I feel uncomfortable, but I am not in danger. I can keep going even if I feel anxious. \"  Be kind to your body:  Relieve tension with exercise or a massage. Get enough rest.  Avoid alcohol, caffeine, nicotine, and illegal drugs. They can increase your anxiety level and cause sleep problems. Learn and do relaxation techniques. See below for more about these techniques. Engage your mind. Get out and do something you enjoy. Go to a funny movie, or take a walk or hike. Plan your day. Having too much or too little to do can make you anxious. Keep a record of your symptoms. Discuss your fears with a good friend or family member, or join a support group for people with similar problems. Talking to others sometimes relieves stress. Get involved in social groups, or volunteer to help others. Being alone sometimes makes things seem worse than they are. Get at least 30 minutes of exercise on most days of the week to relieve stress. Walking is a good choice.  You also may want to do other activities, such as running, swimming, cycling, or playing tennis or team sports. Relaxation techniques  Do relaxation exercises 10 to 20 minutes a day. You can play soothing, relaxing music while you do them, if you wish. Tell others in your house that you are going to do your relaxation exercises. Ask them not to disturb you. Find a comfortable place, away from all distractions and noise. Lie down on your back, or sit with your back straight. Focus on your breathing. Make it slow and steady. Breathe in through your nose. Breathe out through either your nose or mouth. Breathe deeply, filling up the area between your navel and your rib cage. Breathe so that your belly goes up and down. Do not hold your breath. Breathe like this for 5 to 10 minutes. Notice the feeling of calmness throughout your whole body. As you continue to breathe slowly and deeply, relax by doing the following for another 5 to 10 minutes:  Tighten and relax each muscle group in your body. You can begin at your toes and work your way up to your head. Imagine your muscle groups relaxing and becoming heavy. Empty your mind of all thoughts. Let yourself relax more and more deeply. Become aware of the state of calmness that surrounds you. When your relaxation time is over, you can bring yourself back to alertness by moving your fingers and toes and then your hands and feet and then stretching and moving your entire body. Sometimes people fall asleep during relaxation, but they usually wake up shortly afterward. Always give yourself time to return to full alertness before you drive a car or do anything that might cause an accident if you are not fully alert. Never play a relaxation tape while you drive a car. When should you call for help? Call 911 anytime you think you may need emergency care. For example, call if:    You feel you cannot stop from hurting yourself or someone else.    Keep the numbers for these national suicide hotlines: 4-325-167-TALK (5-473.127.8121) and

## 2021-08-16 LAB
ABSOLUTE BASO #: 0.1 10^3/UL (ref 0–0.2)
ABSOLUTE EOS #: 0.1 10^3/UL (ref 0–0.5)
ABSOLUTE LYMPH #: 1.5 10^3/UL (ref 1–4.8)
ABSOLUTE MONO #: 0.8 10^3/UL (ref 0–0.8)
ABSOLUTE NEUT #: 6.4 10^3/UL (ref 1.8–7.7)
AGE TIME: 70 YRS
ALBUMIN SERPL-MCNC: 4 G/DL (ref 3.5–5)
ALP BLD-CCNC: 121 U/L (ref 46–116)
ALT SERPL-CCNC: 19 U/L (ref 21–72)
ANION GAP SERPL CALCULATED.3IONS-SCNC: 8 MMOL/L (ref 7–16)
AST SERPL-CCNC: 15 U/L (ref 17–59)
B-TYPE NATRIURETIC PEPTIDE: 136 PG/ML
BASOPHILS RELATIVE PERCENT: 0.6 % (ref 0–1)
BILIRUB SERPL-MCNC: 0.5 MG/DL (ref 0.1–1.2)
BUN BLDV-MCNC: 16 MG/DL (ref 7–20)
CALCIUM SERPL-MCNC: 9.6 MG/DL (ref 8.5–10.1)
CBC AUTO DIF: ABNORMAL
CHLORIDE BLD-SCNC: 101 MMOL/L (ref 98–108)
CHOLESTEROL, TOTAL: 161 MG/DL (ref 0–200)
CO2: 31.7 MMOL/L (ref 21–32)
COMPREHENSIVE METABOLIC PANEL: ABNORMAL
CREAT SERPL-MCNC: 1.27 MG/DL (ref 0.7–1.3)
DIFFERENTIAL, MANUAL: ABNORMAL
EOSINOPHILS ABSOLUTE: 1.5 % (ref 0–5)
ESTIMATED AVERAGE GLUCOSE: 125.5
GFR AFRICAN AMERICAN: 68 ML/MIN
GFR NON-AFRICAN AMERICAN: 56 ML/MIN
GLUCOSE BLD-MCNC: 97 MG/DL (ref 70–110)
HBA1C MFR BLD: 6 % (ref 4.5–6.2)
HCT VFR BLD CALC: 47.4 % (ref 40–52)
HDLC SERPL-MCNC: 62 MG/DL (ref 40–100)
HEMOGLOBIN: 16.4 G/DL (ref 13.3–17.7)
LDL CHOLESTEROL DIRECT: 78 MG/DL (ref 0–160)
LEUKOCYTES, BLD: 8.9 K/UL (ref 4.5–11)
LIPID PANEL: NORMAL
LYMPHOCYTES ABSOLUTE: 16.4 % (ref 15–42)
MCHC RBC AUTO-ENTMCNC: 31.7 PG (ref 26–34)
MCHC RBC AUTO-ENTMCNC: 34.6 G/DL (ref 31–36)
MCV RBC AUTO: 91.4 FL (ref 80–100)
MONOCYTES ABSOLUTE: 9.3 % (ref 0–8)
NEUTROPHILS SEGMENTED: 72.2 % (ref 40–70)
PDW BLD-RTO: 13.9 % (ref 11–15.3)
PLATELET # BLD: 246 K/UL (ref 150–450)
PMV BLD AUTO: 8.8 FL (ref 7.4–10.4)
POTASSIUM SERPL-SCNC: 4.6 MMOL/L (ref 3.6–5)
RBC # BLD: 5.18 M/UL (ref 4.4–5.9)
SODIUM BLD-SCNC: 141 MMOL/L (ref 137–148)
THYROTROPIN RELEASING HORMONE: 1.82 UIU/ML (ref 0.34–4.82)
TOTAL PROTEIN: 7.5 G/DL (ref 6.4–8.2)
TRIGL SERPL-MCNC: 106 MG/DL (ref 0–150)
URIC ACID, SERUM: 7.1 MG/DL (ref 3.5–7.2)
VITAMIN B-12: 418 PG/ML (ref 193–986)
VLDLC SERPL CALC-MCNC: 21 MG/DL

## 2021-08-17 ENCOUNTER — OFFICE VISIT (OUTPATIENT)
Dept: DERMATOLOGY | Age: 71
End: 2021-08-17
Payer: MEDICARE

## 2021-08-17 VITALS — TEMPERATURE: 98 F

## 2021-08-17 DIAGNOSIS — L30.9 CHRONIC DERMATITIS: Primary | ICD-10-CM

## 2021-08-17 PROCEDURE — 99213 OFFICE O/P EST LOW 20 MIN: CPT | Performed by: DERMATOLOGY

## 2021-08-17 PROCEDURE — 11900 INJECT SKIN LESIONS </W 7: CPT | Performed by: DERMATOLOGY

## 2021-08-17 RX ORDER — DESOXIMETASONE 2.5 MG/G
CREAM TOPICAL
Qty: 60 G | Refills: 0 | Status: SHIPPED | OUTPATIENT
Start: 2021-08-17 | End: 2021-08-30

## 2021-08-17 NOTE — PROGRESS NOTES
[facility]  Debbie Hawkins MD  [address]  1950    79 y.o. male     Date of Visit: 8/17/2021    Chief Complaint: dermatitis    History of Present Illness:    He returns today to follow up for a chronic pruritic eruption on the abdomen and right thigh. Biopsy of the right thigh 1 year ago revealed chronic dermatitis without any characteristic features of CTCL. Biopsies in 2017 revealed mild spongiotic dermatitis with eosinophils. He reports minimal improvement with clobetasol ointment - it does help the itch but does not clear the eruption. He reports that the eruption nearly cleared with intralesional Kenalog but it has recurred. Review of Systems:  Gen: Feels well, good sense of health. Past Medical History, Family History, Surgical History, Medications and Allergies reviewed. Past Medical History:   Diagnosis Date    Allergic rhinitis     Anxiety     Asthma     COPD (chronic obstructive pulmonary disease) ([address]     Hyperlipidemia     Hypertension     Hypothyroidism     Soft tissue sarcoma (HCC)      Past Surgical History:   Procedure Laterality Date    COLONOSCOPY  8./16/2007    BN - 10 year f/u    SINUS SURGERY      SKIN CANCER EXCISION         Allergies   Allergen Reactions    Latex Rash    Contrast [Gadolinium Derivatives] Shortness Of Breath and Anxiety    Soap Rash     Outpatient Medications Marked as Taking for the 8/17/21 encounter (Office Visit) with Keke Redd MD   Medication Sig Dispense Refill    desoximetasone (TOPICORT) 0.25 % cream Apply topically 2 times daily to the rash on the abdomen and right thigh for 2 weeks or until improved.  60 g 0    albuterol sulfate HFA (PROAIR HFA) 108 (90 Base) MCG/ACT inhaler Inhale 2 puffs into the lungs every 6 hours as needed for Wheezing or Shortness of Breath 1 Inhaler 5    SYMBICORT 160-4.5 MCG/ACT AERO TAKE 2 PUFFS BY MOUTH TWICE A DAY 10.2 Inhaler 3    ALPRAZolam (XANAX) 1 MG tablet TAKE 1/2 TABLET ONCE A DAY AND ONE TABLET AT NIGHT 45 tablet 2    atorvastatin (LIPITOR) 40 MG tablet TAKE 1 TABLET BY MOUTH EVERY DAY AT NIGHT 90 tablet 1    allopurinol (ZYLOPRIM) 100 MG tablet Take 1 tablet by mouth daily 30 tablet 3    Blood Pressure Monitoring (BLOOD PRESSURE MONITOR/M CUFF) MISC Use to monitor blood pressure 1 each 0    diclofenac sodium (VOLTAREN) 1 % GEL Apply 2 to 4 g 3 times daily for 2 weeks then twice daily as needed thereafter 100 g 3    clobetasol (TEMOVATE) 0.05 % ointment Apply to affected areas of the skin twice daily until improved. 60 g 2    levothyroxine (SYNTHROID) 100 MCG tablet TAKE 1 TABLET BY MOUTH EVERY DAY 90 tablet 1    diclofenac sodium (VOLTAREN) 1 % GEL APPLY 2 G TOPICALLY 2 TIMES DAILY 100 g 3    colchicine (COLCRYS) 0.6 MG tablet Take 1 tablet by mouth daily PRN      carvedilol (COREG) 6.25 MG tablet Take 1 tablet by mouth 2 times daily (with meals)      torsemide (DEMADEX) 20 MG tablet Take 2 tablets by mouth daily      losartan (COZAAR) 25 MG tablet Take 25 mg by mouth 2 times daily       spironolactone (ALDACTONE) 25 MG tablet Take 0.5 tablets by mouth daily      potassium chloride (KLOR-CON M) 10 MEQ extended release tablet Take 1 tablet by mouth daily 30 tablet 3    albuterol (PROVENTIL) (2.5 MG/3ML) 0.083% nebulizer solution Take 2.5 mg by nebulization every 6 hours as needed for Wheezing or Shortness of Breath      nicotine (NICODERM CQ) 21 MG/24HR Place 1 patch onto the skin every 24 hours PRN      Multiple Vitamins-Minerals (CENTRUM SILVER ADULT 50+ PO) Take 1 tablet by mouth daily      aspirin 81 MG tablet Take 1 tablet by mouth daily. 30 tablet 5    albuterol (PROAIR HFA) 108 (90 BASE) MCG/ACT inhaler Inhale 2 puffs into the lungs every 6 hours as needed. Physical Examination       Well appearing. 1.  Right lateral thigh and left abdomen - lichenified scaly pink plaques.               Assessment and

## 2021-09-23 ENCOUNTER — CARE COORDINATION (OUTPATIENT)
Dept: CARE COORDINATION | Age: 71
End: 2021-09-23

## 2021-09-23 NOTE — CARE COORDINATION
Placed phone call to reach patient for the purpose of CM FU and he was not available. LM and provided contact information. Plan  Complete med review, SDOH and goals  FU on CHF  FU on COPD    Bridget Hayden RN, BSN, [address] [place] [address] Primary Care  [phone]

## 2021-09-24 ENCOUNTER — CARE COORDINATION (OUTPATIENT)
Dept: CARE COORDINATION | Age: 71
End: 2021-09-24

## 2021-09-24 RX ORDER — ALPRAZOLAM 0.5 MG/1
1 TABLET ORAL NIGHTLY PRN
COMMUNITY
End: 2021-11-02

## 2021-09-24 SDOH — HEALTH STABILITY: PHYSICAL HEALTH: ON AVERAGE, HOW MANY MINUTES DO YOU ENGAGE IN EXERCISE AT THIS LEVEL?: 40 MIN

## 2021-09-24 SDOH — HEALTH STABILITY: PHYSICAL HEALTH: ON AVERAGE, HOW MANY DAYS PER WEEK DO YOU ENGAGE IN MODERATE TO STRENUOUS EXERCISE (LIKE A BRISK WALK)?: 3 DAYS

## 2021-09-24 SDOH — ECONOMIC STABILITY: HOUSING INSECURITY
IN THE LAST 12 MONTHS, WAS THERE A TIME WHEN YOU DID NOT HAVE A STEADY PLACE TO SLEEP OR SLEPT IN A SHELTER (INCLUDING NOW)?: NO

## 2021-09-24 SDOH — ECONOMIC STABILITY: HOUSING INSECURITY: IN THE LAST 12 MONTHS, HOW MANY PLACES HAVE YOU LIVED?: 1

## 2021-09-24 SDOH — HEALTH STABILITY: PHYSICAL HEALTH: ON AVERAGE, HOW MANY DAYS PER WEEK DO YOU ENGAGE IN MODERATE TO STRENUOUS EXERCISE (LIKE A BRISK WALK)?: 5 DAYS

## 2021-09-24 SDOH — HEALTH STABILITY: PHYSICAL HEALTH: ON AVERAGE, HOW MANY MINUTES DO YOU ENGAGE IN EXERCISE AT THIS LEVEL?: 20 MIN

## 2021-09-24 SDOH — ECONOMIC STABILITY: INCOME INSECURITY: IN THE LAST 12 MONTHS, WAS THERE A TIME WHEN YOU WERE NOT ABLE TO PAY THE MORTGAGE OR RENT ON TIME?: NO

## 2021-09-24 ASSESSMENT — SOCIAL DETERMINANTS OF HEALTH (SDOH)
ARE YOU MARRIED, WIDOWED, DIVORCED, SEPARATED, NEVER MARRIED, OR LIVING WITH A PARTNER?: NEVER MARRIED
IN A TYPICAL WEEK, HOW MANY TIMES DO YOU TALK ON THE PHONE WITH FAMILY, FRIENDS, OR NEIGHBORS?: THREE TIMES A WEEK
HOW OFTEN DO YOU ATTENT MEETINGS OF THE CLUB OR ORGANIZATION YOU BELONG TO?: 1 TO 4 TIMES PER YEAR
DO YOU BELONG TO ANY CLUBS OR ORGANIZATIONS SUCH AS CHURCH GROUPS UNIONS, FRATERNAL OR ATHLETIC GROUPS, OR SCHOOL GROUPS?: YES
HOW OFTEN DO YOU ATTEND CHURCH OR RELIGIOUS SERVICES?: 1 TO 4 TIMES PER YEAR
HOW OFTEN DO YOU GET TOGETHER WITH FRIENDS OR RELATIVES?: THREE TIMES A WEEK

## 2021-09-24 ASSESSMENT — LIFESTYLE VARIABLES: HOW OFTEN DO YOU HAVE A DRINK CONTAINING ALCOHOL: NEVER

## 2021-09-24 NOTE — CARE COORDINATION
Ambulatory Care Coordination Note  9/24/2021  CM Risk Score: 4  Charlson 10 Year Mortality Risk Score: 100%     ACC: Kiera Verdugo. Gabriela Kennedy RN    Summary Note: Spoke with patient over the phone. Reviewed and updated his medication list.  He says he has taken xanax since college \"a long time ago\". He takes . 5 mg around 5-6 and 1 mg before he goes to bed. He said if he did not he would have \"full blown panic attack\"    Patient shared his upcoming birthday plans in [facility]. He is going to attend a [employer] tournament. He says he has done that for years. He has not been bicycling recently and fears he will gain weight. He hopes to begin bicycling again soon. His niece also lives there and he will visit her. Patient did share about his neck, shoulder and head pain recently. He says he is going to receive a massage first.  Then if it does not help he will speak with his PCP Dr. Marisela Escalante. Plan  FU on birthday in [facility]  FU on COPD  FU on CHF    Kiera Verdugo. MALIKA Palafox, [facility]     [phone]          Care Coordination Interventions    Program Enrollment: Complex Care  Referral from Primary Care Provider: No  Suggested Interventions and Community Resources  Medi Set or Pill Pack: In Process (Comment: patient completes)  Zone Management Tools: Not Started (Comment: CHF)         Goals Addressed                 This Visit's Progress     Wellness Goal        Patient Self-Management Goal for Health Maintenance  Goal: I will follow a healthy diet as discussed by my provider. I will schedule a yearly preventative care visit. I will schedule screening colonoscopies as directed by my provider. I will schedule routine eye examinations with an eye specialist as directed by my provider. I will consider obtaining vaccines recommended by my provider.    Barriers: lack of motivation  Plan for overcoming my barriers: Working with Kasey Crum  Confidence: 6/10  Anticipated Goal Completion Date: December 2021            Prior to Admission medications    Medication Sig Start Date End Date Taking? Authorizing Provider   ALPRAZolam Alfjimena Posey) 0.5 MG tablet Take 1 mg by mouth nightly as needed for Sleep.    Yes Historical Provider, MD   desoximetasone (TOPICORT) 0.25 % cream APPLY TO AFFECTED AREA(S) TWO TIMES A DAY FOR 2 WEEKS OR UNTIL IMPROVED 8/30/21  Yes Mike Morrow MD   albuterol sulfate HFA (PROAIR HFA) 108 (90 Base) MCG/ACT inhaler Inhale 2 puffs into the lungs every 6 hours as needed for Wheezing or Shortness of Breath 8/2/21  Yes Maria Ines Lujan MD   SYMBICORT 160-4.5 MCG/ACT AERO TAKE 2 PUFFS BY MOUTH TWICE A DAY 7/29/21  Yes Maria Ines Lujan MD   atorvastatin (LIPITOR) 40 MG tablet TAKE 1 TABLET BY MOUTH EVERY DAY AT NIGHT 6/10/21  Yes Maria Ines Lujan MD   allopurinol (ZYLOPRIM) 100 MG tablet Take 1 tablet by mouth daily 6/2/21  Yes Maria Ines Lujan MD   Blood Pressure Monitoring (BLOOD PRESSURE MONITOR/M CUFF) MISC Use to monitor blood pressure 6/2/21  Yes Maria Ines Lujan MD   levothyroxine (SYNTHROID) 100 MCG tablet TAKE 1 TABLET BY MOUTH EVERY DAY 3/22/21  Yes Maria Ines Lujan MD   colchicine (COLCRYS) 0.6 MG tablet Take 1 tablet by mouth daily PRN 6/17/19  Yes Historical Provider, MD   carvedilol (COREG) 6.25 MG tablet Take 1 tablet by mouth 2 times daily (with meals) 5/6/19  Yes Maria Ines Lujan MD   torsemide (DEMADEX) 20 MG tablet Take 2 tablets by mouth daily 5/6/19  Yes Maria Ines Lujan MD   losartan (COZAAR) 25 MG tablet Take 25 mg by mouth 2 times daily  5/6/19  Yes Maria Ines Lujan MD   spironolactone (ALDACTONE) 25 MG tablet Take 0.5 tablets by mouth daily 5/6/19  Yes Maria Ines Lujan MD   potassium chloride (KLOR-CON M) 10 MEQ extended release tablet Take 1 tablet by mouth daily 4/4/18  Yes ANTONIO Bell CNP   albuterol (PROVENTIL) (2.5 MG/3ML) 0.083% nebulizer solution Take 2.5 mg by nebulization every 6 hours as needed for Wheezing or Shortness of Breath   Yes Historical Provider, MD   albuterol (PROAIR HFA) 108 (90 BASE) MCG/ACT inhaler Inhale 2 puffs into the lungs every 6 hours as needed. Yes Historical Provider, MD   diclofenac sodium (VOLTAREN) 1 % GEL Apply 2 to 4 g 3 times daily for 2 weeks then twice daily as needed thereafter  Patient not taking: Reported on 9/24/2021 5/18/21   Doris Marks MD   clobetasol (TEMOVATE) 0.05 % ointment Apply to affected areas of the skin twice daily until improved. Patient not taking: Reported on 9/24/2021 5/4/21   Kamini Montoya MD   diclofenac sodium (VOLTAREN) 1 % GEL APPLY 2 G TOPICALLY 2 TIMES DAILY  Patient not taking: Reported on 9/24/2021 2/1/21   Willis Lee MD   nicotine (NICODERM CQ) 21 MG/24HR Place 1 patch onto the skin every 24 hours PRN  Patient not taking: Reported on 9/24/2021    Historical Provider, MD   Multiple Vitamins-Minerals (CENTRUM SILVER ADULT 50+ PO) Take 1 tablet by mouth daily  Patient not taking: Reported on 9/24/2021    Historical Provider, MD   aspirin 81 MG tablet Take 1 tablet by mouth daily.   Patient not taking: Reported on 9/24/2021 7/25/13   Willis Lee MD       Future Appointments   Date Time Provider Vicky Noriega   11/2/2021  9:45 AM MD [facility] [place] [facility]   11/10/2021 10:45 AM Kamini Montoya MD [facility]     ,   Congestive Heart Failure Assessment    Are you currently restricting fluids?: No Restriction  Do you understand a low sodium diet?: Yes  Do you understand how to read food labels?: Yes  How many restaurant meals do you eat per week?: 0  Do you salt your food before tasting it?: No     No patient-reported symptoms      Symptoms:        ,   COPD Assessment    Does the patient understand envrionmental exposure?: Yes  Is the patient able to verbalize Rescue vs. Long Acting medications?: Yes  Does the patient have a nebulizer?: No  Does the patient use a space with inhaled medications?: No     No patient-reported symptoms         Symptoms:         and   General Assessment    Do you have any symptoms that are causing concern?: No

## 2021-10-04 DIAGNOSIS — F41.1 GENERALIZED ANXIETY DISORDER: ICD-10-CM

## 2021-10-04 RX ORDER — ALPRAZOLAM 1 MG/1
TABLET ORAL
Qty: 45 TABLET | Refills: 0 | Status: SHIPPED | OUTPATIENT
Start: 2021-10-04 | End: 2021-12-16

## 2021-10-04 NOTE — TELEPHONE ENCOUNTER
Controlled Substance Monitoring:    Acute and Chronic Pain Monitoring:   RX Monitoring 10/4/2021   Attestation -   Periodic Controlled Substance Monitoring No signs of potential drug abuse or diversion identified.

## 2021-10-12 ENCOUNTER — CARE COORDINATION (OUTPATIENT)
Dept: CARE COORDINATION | Age: 71
End: 2021-10-12

## 2021-10-12 RX ORDER — DESOXIMETASONE 2.5 MG/G
CREAM TOPICAL
Qty: 60 G | Refills: 2 | Status: SHIPPED | OUTPATIENT
Start: 2021-10-12 | End: 2022-06-09

## 2021-10-12 NOTE — CARE COORDINATION
Placed phone call to patient for the purpose of CM FU and he was not available. LM and provided contact information. Plan  FU on [place] birthday trip  FU on COPD  FU on CHF    Candy Rodriguez RN, BSN, [address] [place] [address] Primary Care  [phone]

## 2021-10-26 ENCOUNTER — CARE COORDINATION (OUTPATIENT)
Dept: CARE COORDINATION | Age: 71
End: 2021-10-26

## 2021-10-26 NOTE — CARE COORDINATION
Phone call placed to reach patient for the purpose of CM FU and he was not available. LM and provided contact information. Plan  FU on COPD  FU on CHF    Duane Lebron, RN, BSN, [address] [place] [address]  [phone]

## 2021-11-01 DIAGNOSIS — E03.9 ACQUIRED HYPOTHYROIDISM: ICD-10-CM

## 2021-11-01 RX ORDER — LEVOTHYROXINE SODIUM 0.1 MG/1
TABLET ORAL
Qty: 90 TABLET | Refills: 1 | Status: SHIPPED | OUTPATIENT
Start: 2021-11-01 | End: 2022-05-03

## 2021-11-01 NOTE — TELEPHONE ENCOUNTER
Medication:   Requested Prescriptions     Pending Prescriptions Disp Refills    levothyroxine (SYNTHROID) 100 MCG tablet [Pharmacy Med Name: LEVOTHYROXINE 100 MCG TABLET] 90 tablet 1     Sig: TAKE 1 TABLET BY MOUTH EVERY DAY     Last Filled: 3.22.21    Last appt: 8/2/2021   Next appt: 11/2/2021    Last OARRS:   RX Monitoring 10/4/2021   Attestation -   Periodic Controlled Substance Monitoring No signs of potential drug abuse or diversion identified.

## 2021-11-02 ENCOUNTER — OFFICE VISIT (OUTPATIENT)
Dept: PRIMARY CARE CLINIC | Age: 71
End: 2021-11-02
Payer: MEDICARE

## 2021-11-02 VITALS
HEART RATE: 82 BPM | RESPIRATION RATE: 16 BRPM | DIASTOLIC BLOOD PRESSURE: 88 MMHG | OXYGEN SATURATION: 98 % | BODY MASS INDEX: 31.45 KG/M2 | TEMPERATURE: 98.1 F | SYSTOLIC BLOOD PRESSURE: 130 MMHG | WEIGHT: 200.8 LBS

## 2021-11-02 DIAGNOSIS — Z79.899 MEDICATION MANAGEMENT: ICD-10-CM

## 2021-11-02 DIAGNOSIS — I10 ESSENTIAL HYPERTENSION: ICD-10-CM

## 2021-11-02 DIAGNOSIS — R51.9 NONINTRACTABLE HEADACHE, UNSPECIFIED CHRONICITY PATTERN, UNSPECIFIED HEADACHE TYPE: ICD-10-CM

## 2021-11-02 DIAGNOSIS — J44.9 CHRONIC OBSTRUCTIVE PULMONARY DISEASE, UNSPECIFIED COPD TYPE (HCC): ICD-10-CM

## 2021-11-02 DIAGNOSIS — R53.83 FATIGUE, UNSPECIFIED TYPE: ICD-10-CM

## 2021-11-02 DIAGNOSIS — F41.1 GENERALIZED ANXIETY DISORDER: Primary | ICD-10-CM

## 2021-11-02 DIAGNOSIS — Z23 NEED FOR INFLUENZA VACCINATION: ICD-10-CM

## 2021-11-02 DIAGNOSIS — N18.31 STAGE 3A CHRONIC KIDNEY DISEASE (HCC): ICD-10-CM

## 2021-11-02 DIAGNOSIS — M79.601 RIGHT ARM PAIN: ICD-10-CM

## 2021-11-02 DIAGNOSIS — M54.2 NECK PAIN: ICD-10-CM

## 2021-11-02 LAB
ANION GAP SERPL CALCULATED.3IONS-SCNC: 15 MMOL/L (ref 3–16)
BANDED NEUTROPHILS RELATIVE PERCENT: 1 % (ref 0–7)
BASOPHILS ABSOLUTE: 0 K/UL (ref 0–0.2)
BASOPHILS RELATIVE PERCENT: 0 %
BUN BLDV-MCNC: 29 MG/DL (ref 7–20)
CALCIUM SERPL-MCNC: 9.8 MG/DL (ref 8.3–10.6)
CHLORIDE BLD-SCNC: 102 MMOL/L (ref 99–110)
CO2: 24 MMOL/L (ref 21–32)
CREAT SERPL-MCNC: 1.3 MG/DL (ref 0.8–1.3)
EOSINOPHILS ABSOLUTE: 0.3 K/UL (ref 0–0.6)
EOSINOPHILS RELATIVE PERCENT: 3 %
GFR AFRICAN AMERICAN: >60
GFR NON-AFRICAN AMERICAN: 54
GLUCOSE BLD-MCNC: 73 MG/DL (ref 70–99)
HCT VFR BLD CALC: 46.9 % (ref 40.5–52.5)
HEMOGLOBIN: 15.5 G/DL (ref 13.5–17.5)
LYMPHOCYTES ABSOLUTE: 1.1 K/UL (ref 1–5.1)
LYMPHOCYTES RELATIVE PERCENT: 11 %
MCH RBC QN AUTO: 31 PG (ref 26–34)
MCHC RBC AUTO-ENTMCNC: 33.1 G/DL (ref 31–36)
MCV RBC AUTO: 93.6 FL (ref 80–100)
MONOCYTES ABSOLUTE: 0.9 K/UL (ref 0–1.3)
MONOCYTES RELATIVE PERCENT: 9 %
NEUTROPHILS ABSOLUTE: 7.6 K/UL (ref 1.7–7.7)
NEUTROPHILS RELATIVE PERCENT: 76 %
PDW BLD-RTO: 14.3 % (ref 12.4–15.4)
PLATELET # BLD: 211 K/UL (ref 135–450)
PLATELET SLIDE REVIEW: ADEQUATE
PMV BLD AUTO: 9.6 FL (ref 5–10.5)
POTASSIUM SERPL-SCNC: 5 MMOL/L (ref 3.5–5.1)
RBC # BLD: 5.01 M/UL (ref 4.2–5.9)
SLIDE REVIEW: NORMAL
SODIUM BLD-SCNC: 141 MMOL/L (ref 136–145)
TSH SERPL DL<=0.05 MIU/L-ACNC: 1.54 UIU/ML (ref 0.27–4.2)
WBC # BLD: 9.9 K/UL (ref 4–11)

## 2021-11-02 PROCEDURE — 90694 VACC AIIV4 NO PRSRV 0.5ML IM: CPT | Performed by: INTERNAL MEDICINE

## 2021-11-02 PROCEDURE — G0008 ADMIN INFLUENZA VIRUS VAC: HCPCS | Performed by: INTERNAL MEDICINE

## 2021-11-02 PROCEDURE — 99214 OFFICE O/P EST MOD 30 MIN: CPT | Performed by: INTERNAL MEDICINE

## 2021-11-02 RX ORDER — ACETAMINOPHEN 500 MG
1000 TABLET ORAL 3 TIMES DAILY PRN
COMMUNITY
Start: 2021-11-02

## 2021-11-02 RX ORDER — ELECTROLYTES/DEXTROSE
1 SOLUTION, ORAL ORAL DAILY
COMMUNITY
Start: 2021-11-02 | End: 2021-12-16

## 2021-11-02 RX ORDER — TIZANIDINE 2 MG/1
2 TABLET ORAL 2 TIMES DAILY PRN
Qty: 30 TABLET | Refills: 0 | Status: SHIPPED | OUTPATIENT
Start: 2021-11-02 | End: 2021-11-17 | Stop reason: SDUPTHER

## 2021-11-02 ASSESSMENT — ENCOUNTER SYMPTOMS
CONSTIPATION: 0
ABDOMINAL PAIN: 0
CHEST TIGHTNESS: 0
NAUSEA: 0
SHORTNESS OF BREATH: 1
SORE THROAT: 0
COUGH: 1
WHEEZING: 1
DIARRHEA: 0
RHINORRHEA: 0
VOMITING: 0

## 2021-11-02 NOTE — PROGRESS NOTES
Aakash Young   Date ofBirth:  1950    Date of Visit:  11/2/2021    Chief Complaint   Patient presents with    Anxiety    Hypertension    COPD    Chronic Kidney Disease       HPI  Patient has anxiety. Patient takes Xanax 1mg 1/2 tablet po q evening and 1 tablet po qhs. Patient states he had the start of a panic attack 2 weeks with worry and thought he was going to have a heart attack. Patient states he took Xanax and waited and it passed. Patient states he sits around and thinks about himself. Patient states he gets angry at himself if things are not where they out to be. Patient states he has a routine and likes it kept. Patient states he has little patience for himself and feels like he should be able to do what he used to do when he was younger. Patient states he makes himself do something at his house every day. Patient states he doesn't have a regular daily order of anything since he has retired. Patient has hypertension. Patient takes Losartan 50mg po q day and Carvedilol 6.25mg po bid. Patient decreases salt. Patient is not exercising.     Patient has COPD. Patient takes Symbicort 160-4.5mg 2 puffs bid. Patient states he uses ProAir inhaler occasionally. Patient has chronic kidney disease. Patient takes diuretics for CHF. Patient complains of fatigue. Patient states he doesn't have energy. Patient states his appetite is increased and he is not exercising. Patient states he has gained weight. Patient states he has pain between shoulder and neck and right arm. Patient states pain radiates up back of right side neck and into his right head. Arm pain is sharp. Neck pain is dull. Headache is sharp and debilitating. Patient states he has had pain for 2-3 months off and on. Patient states the most recent episode was last night. Patient states he takes 2 Tylenol ES gel caps, ice or heat. Patient goes to sleep and hopes the pain goes away.      Review of Systems   Constitutional: CUFF) MISC Use to monitor blood pressure 1 each 0    clobetasol (TEMOVATE) 0.05 % ointment Apply to affected areas of the skin twice daily until improved. 60 g 2    colchicine (COLCRYS) 0.6 MG tablet Take 1 tablet by mouth daily PRN      carvedilol (COREG) 6.25 MG tablet Take 1 tablet by mouth 2 times daily (with meals)      torsemide (DEMADEX) 20 MG tablet Take 2 tablets by mouth daily      losartan (COZAAR) 25 MG tablet Take 25 mg by mouth 2 times daily       spironolactone (ALDACTONE) 25 MG tablet Take 0.5 tablets by mouth daily      potassium chloride (KLOR-CON M) 10 MEQ extended release tablet Take 1 tablet by mouth daily 30 tablet 3    albuterol (PROVENTIL) (2.5 MG/3ML) 0.083% nebulizer solution Take 2.5 mg by nebulization every 6 hours as needed for Wheezing or Shortness of Breath           Vitals:    11/02/21 0933   BP: 130/88   Pulse: 82   Resp: 16   Temp: 98.1 °F (36.7 °C)   SpO2: 98%   Weight: 200 lb 12.8 oz (91.1 kg)     Body mass index is 31.45 kg/m². Physical Exam  Nursing note reviewed. Constitutional:       General: He is not in acute distress. Appearance: Normal appearance. He is well-developed. HENT:      Right Ear: Tympanic membrane and ear canal normal.      Left Ear: Tympanic membrane and ear canal normal.      Nose:      Right Sinus: No maxillary sinus tenderness. Left Sinus: No maxillary sinus tenderness. Eyes:      General: Lids are normal.      Extraocular Movements: Extraocular movements intact. Conjunctiva/sclera: Conjunctivae normal.      Pupils: Pupils are equal, round, and reactive to light. Neck:      Thyroid: No thyromegaly. Vascular: No carotid bruit. Cardiovascular:      Rate and Rhythm: Normal rate and regular rhythm. Heart sounds: Normal heart sounds, S1 normal and S2 normal. No murmur heard. No friction rub. No gallop. Pulmonary:      Effort: Pulmonary effort is normal.      Breath sounds: Normal breath sounds.  No wheezing, rhonchi or rales. Abdominal:      General: Bowel sounds are normal. There is no distension. Palpations: Abdomen is soft. Tenderness: There is no abdominal tenderness. Musculoskeletal:      Cervical back: Neck supple. Right lower leg: No edema. Left lower leg: No edema. Lymphadenopathy:      Head:      Right side of head: No submandibular adenopathy. Left side of head: No submandibular adenopathy. Neurological:      Mental Status: He is alert. Psychiatric:         Mood and Affect: Mood normal.           No results found for this visit on 11/02/21.   Lab Review   Hospital Outpatient Visit on 06/22/2021   Component Date Value    BNP 08/16/2021 136     Thyrotropin Releasing Ho* 08/16/2021 1.82     Uric Acid, Serum 08/16/2021 7.1     LIPID PANEL 08/16/2021 NA     Cholesterol, Total 08/16/2021 161     Triglycerides 08/16/2021 106     HDL 08/16/2021 62     LDL Direct 08/16/2021 78     VLDL 08/16/2021 21     COMPREHENSIVE METABOLIC * 16/33/5060 NA     Sodium 08/16/2021 141     Potassium 08/16/2021 4.6     Chloride 08/16/2021 101     CO2 08/16/2021 31.7     Anion Gap 08/16/2021 8     POC Glucose 08/16/2021 97     BUN 08/16/2021 16     CREATININE 08/16/2021 1.27     Calcium 08/16/2021 9.6     Albumin 08/16/2021 4.0     Total Protein 08/16/2021 7.5     Total Bilirubin 08/16/2021 0.50     Alkaline Phosphatase 08/16/2021 121*    AST 08/16/2021 15*    ALT 08/16/2021 19*    Age Time 08/16/2021 70     GFR Non- 08/16/2021 56     GFR  08/16/2021 68     CBC Auto Dif 08/16/2021 NA     Leukocytes, Bld 08/16/2021 8.9     RBC 08/16/2021 5.18     Hemoglobin 08/16/2021 16.4     Hematocrit 08/16/2021 47.4     MCV 08/16/2021 91.4     MCHC 08/16/2021 31.7     MCHC 08/16/2021 34.6     RDW 08/16/2021 13.9     Platelets 61/28/0704 246     MPV 08/16/2021 8.8     Neutrophils Segmented 08/16/2021 72.2*    Lymphocytes Absolute 08/16/2021 16.4  Monocytes Absolute 08/16/2021 9.3*    Eosinophils Absolute 08/16/2021 1.5     Basophils % 08/16/2021 0.6     Differential, manual 08/16/2021 NOT INDICATED     Absolute Neut # 08/16/2021 6.4     Absolute Lymph # 08/16/2021 1.5     Absolute Mono # 08/16/2021 0.8     Absolute Eos # 08/16/2021 0.1     Absolute Baso # 08/16/2021 0.1     Vitamin B-12 08/16/2021 418     Hemoglobin A1C 08/16/2021 6.0     eAG 08/16/2021 125.5    Office Visit on 05/13/2021   Component Date Value    BASIC METABOLIC PANEL 06/84/1637 NA     Sodium 05/21/2021 140     Potassium 05/21/2021 4.2     Chloride 05/21/2021 104     CO2 05/21/2021 30.5     Anion Gap 05/21/2021 6*    POC Glucose 05/21/2021 104     BUN 05/21/2021 26*    CREATININE 05/21/2021 1.29     Calcium 05/21/2021 9.1     Age Time 05/21/2021 70     GFR Non- 05/21/2021 55     GFR  05/21/2021 67     BNP 05/21/2021 62          Assessment/Plan     1. Generalized anxiety disorder  -stable  -Continue same medications    2. Essential hypertension  -stable  -Continue same medications  -Low sodium diet  -Regular aerobic exercise for 20 minutes 2-3 times per week and advance to a goal of 150 minutes per week    3. Chronic obstructive pulmonary disease, unspecified COPD type ([facility] [address]  -stable  -Continue same medications    4. Stage 3a chronic kidney disease (HCC)  -stable  -on diuretics  - Avoid NSAID's such as otc Ibuprofen, Advil, Motrin, Naprosyn, and Aleve as well as prescription NSAID's    5. Fatigue, unspecified type  - CBC Auto Differential; Future  - TSH without Reflex; Future  - Basic Metabolic Panel; Future  - Multiple Vitamin (MULTIVITAMIN ADULT) TABS; Take 1 tablet by mouth daily  -Regular aerobic exercise for 20 minutes 2-3 times per week and advance to a goal of 150 minutes per week    6. Neck pain  - Referral to Jesus Alberto Rodas MD, Orthopedic Surgery, [facility]  - acetaminophen (TYLENOL) 500 MG tablet;  Take 2 tablets by mouth 3 times daily as needed for Pain  - tiZANidine (ZANAFLEX) 2 MG tablet; Take 1 tablet by mouth 2 times daily as needed (pain)  Dispense: 30 tablet; Refill: 0    7. Right arm pain  - Referral to Jade Rachel MD, Orthopedic Surgery, [facility]  - acetaminophen (TYLENOL) 500 MG tablet; Take 2 tablets by mouth 3 times daily as needed for Pain    8. Nonintractable headache, unspecified chronicity pattern, unspecified headache type  - acetaminophen (TYLENOL) 500 MG tablet; Take 2 tablets by mouth 3 times daily as needed for Pain    9. Need for influenza vaccination  - INFLUENZA, QUADV, ADJUVANTED, 65 YRS =, IM, PF, PREFILL SYR, 0.5ML (FLUAD)    10. Medication management  - Drug Panel-PM-HI Res-UR Interp-A      Discussed medications with patient, who voiced understanding of their use and indications. All questions answered. Return in about 3 weeks (around 11/23/2021) for fatigue and headaches.

## 2021-11-02 NOTE — PATIENT INSTRUCTIONS
1. Generalized anxiety disorder  -stable  -Continue same medications    2. Essential hypertension  -stable  -Continue same medications  -Low sodium diet  -Regular aerobic exercise for 20 minutes 2-3 times per week and advance to a goal of 150 minutes per week    3. Chronic obstructive pulmonary disease, unspecified COPD type ([facility] [address]  -stable  -Continue same medications    4. Stage 3a chronic kidney disease (HCC)  -stable  -on diuretics  - Avoid NSAID's such as otc Ibuprofen, Advil, Motrin, Naprosyn, and Aleve as well as prescription NSAID's    5. Fatigue, unspecified type  - CBC Auto Differential; Future  - TSH without Reflex; Future  - Basic Metabolic Panel; Future  - Multiple Vitamin (MULTIVITAMIN ADULT) TABS; Take 1 tablet by mouth daily  -Regular aerobic exercise for 20 minutes 2-3 times per week and advance to a goal of 150 minutes per week    6. Neck pain  - Referral to Ac Elliott MD, Orthopedic Surgery, [place]  - acetaminophen (TYLENOL) 500 MG tablet; Take 2 tablets by mouth 3 times daily as needed for Pain  - tiZANidine (ZANAFLEX) 2 MG tablet; Take 1 tablet by mouth 2 times daily as needed (pain)  Dispense: 30 tablet; Refill: 0    7. Right arm pain  - [facility]  - acetaminophen (TYLENOL) 500 MG tablet; Take 2 tablets by mouth 3 times daily as needed for Pain    8. Nonintractable headache, unspecified chronicity pattern, unspecified headache type  - acetaminophen (TYLENOL) 500 MG tablet; Take 2 tablets by mouth 3 times daily as needed for Pain    9. Need for influenza vaccination  - INFLUENZA, QUADV, ADJUVANTED, 65 YRS =, IM, PF, PREFILL SYR, 0.5ML (FLUAD)    10. Medication management  - Drug Panel-PM-HI Res-UR Interp-A    Patient Education        Influenza (Flu) Vaccine (Inactivated or Recombinant): What You Need to Know  Why get vaccinated? Influenza vaccine can prevent influenza (flu).   Flu is a contagious disease that spreads around the [place] every year, usually between October and May. Anyone can get the flu, but it is more dangerous for some people. Infants and young children, people 72years of age and older, pregnant women, and people with certain health conditions or a weakened immune system are at greatest risk of flu complications. Pneumonia, bronchitis, sinus infections and ear infections are examples of flu-related complications. If you have a medical condition, such as heart disease, cancer or diabetes, flu can make it worse. Flu can cause fever and chills, sore throat, muscle aches, fatigue, cough, headache, and runny or stuffy nose. Some people may have vomiting and diarrhea, though this is more common in children than adults. Each year, thousands of people in the [facility] die from flu, and many more are hospitalized. Flu vaccine prevents millions of illnesses and flu-related visits to the doctor each year. Influenza vaccine  CDC recommends everyone 10months of age and older get vaccinated every flu season. Children 6 months through 6years of age may need 2 doses during a single flu season. Everyone else needs only 1 dose each flu season. It takes about 2 weeks for protection to develop after vaccination. There are many flu viruses, and they are always changing. Each year a new flu vaccine is made to protect against three or four viruses that are likely to cause disease in the upcoming flu season. Even when the vaccine doesn't exactly match these viruses, it may still provide some protection. Influenza vaccine does not cause flu. Influenza vaccine may be given at the same time as other vaccines. Talk with your health care provider  Tell your vaccine provider if the person getting the vaccine:  · Has had an allergic reaction after a previous dose of influenza vaccine, or has any severe, life-threatening allergies. · Has ever had Guillain-Barré Syndrome (also called GBS).   In some cases, your health care provider may decide to postpone influenza vaccination to a future visit. People with minor illnesses, such as a cold, may be vaccinated. People who are moderately or severely ill should usually wait until they recover before getting influenza vaccine. Your health care provider can give you more information. Risks of a vaccine reaction  · Soreness, redness, and swelling where shot is given, fever, muscle aches, and headache can happen after influenza vaccine. · There may be a very small increased risk of Guillain-Barré Syndrome (GBS) after inactivated influenza vaccine (the flu shot). Zoran Power children who get the flu shot along with pneumococcal vaccine (PCV13), and/or DTaP vaccine at the same time might be slightly more likely to have a seizure caused by fever. Tell your health care provider if a child who is getting flu vaccine has ever had a seizure. People sometimes faint after medical procedures, including vaccination. Tell your provider if you feel dizzy or have vision changes or ringing in the ears. As with any medicine, there is a very remote chance of a vaccine causing a severe allergic reaction, other serious injury, or death. What if there is a serious problem? An allergic reaction could occur after the vaccinated person leaves the clinic. If you see signs of a severe allergic reaction (hives, swelling of the face and throat, difficulty breathing, a fast heartbeat, dizziness, or weakness), call 9-1-1 and get the person to the nearest hospital.  For other signs that concern you, call your health care provider. Adverse reactions should be reported to the Vaccine Adverse Event Reporting System (VAERS). Your health care provider will usually file this report, or you can do it yourself. Visit the VAERS website at www.vaers. hhs.gov or call 4-875.395.5418. VAERS is only for reporting reactions, and VAERS staff do not give medical advice.   The [employer] Injury Compensation Program  The National Vaccine Injury Compensation Program (VICP) is a federal program that was created to compensate people who may have been injured by certain vaccines. Visit the VICP website at www.hrsa.gov/vaccinecompensation or call 8-313.749.4371 to learn about the program and about filing a claim. There is a time limit to file a claim for compensation. How can I learn more? · Ask your healthcare provider. · Call your local or state health department. · Contact the Centers for Disease Control and Prevention (CDC):  ? Call 4-271.405.7898 (1-800-CDC-INFO) or  ? Visit CDC's website at www.cdc.gov/flu  Vaccine Information Statement (Interim)  Inactivated Influenza Vaccine  8/15/2019  42 LUCINA Parnell 214KB-14  Department of Health and Human Services  Centers for Disease Control and Prevention  Many Vaccine Information Statements are available in Ukrainian and other languages. See www.immunize.org/vis. Muchas hojas de información sobre vacunas están disponibles en español y en otros idiomas. Visite www.immunize.org/vis. Care instructions adapted under license by [facility] ([facility]). If you have questions about a medical condition or this instruction, always ask your healthcare professional. [place] [address] any warranty or liability for your use of this information.

## 2021-11-04 ENCOUNTER — OFFICE VISIT (OUTPATIENT)
Dept: ORTHOPEDIC SURGERY | Age: 71
End: 2021-11-04
Payer: MEDICARE

## 2021-11-04 VITALS — BODY MASS INDEX: 31.52 KG/M2 | HEIGHT: 67 IN | WEIGHT: 200.84 LBS

## 2021-11-04 DIAGNOSIS — M47.812 CERVICAL SPONDYLOSIS: ICD-10-CM

## 2021-11-04 DIAGNOSIS — M50.30 DDD (DEGENERATIVE DISC DISEASE), CERVICAL: ICD-10-CM

## 2021-11-04 DIAGNOSIS — S46.011A STRAIN OF RIGHT ROTATOR CUFF CAPSULE, INITIAL ENCOUNTER: ICD-10-CM

## 2021-11-04 DIAGNOSIS — M25.511 RIGHT SHOULDER PAIN, UNSPECIFIED CHRONICITY: ICD-10-CM

## 2021-11-04 DIAGNOSIS — M75.51 BURSITIS OF RIGHT SHOULDER: ICD-10-CM

## 2021-11-04 DIAGNOSIS — M54.12 RADICULITIS OF RIGHT CERVICAL REGION: ICD-10-CM

## 2021-11-04 DIAGNOSIS — M54.2 NECK PAIN: ICD-10-CM

## 2021-11-04 PROCEDURE — 20610 DRAIN/INJ JOINT/BURSA W/O US: CPT | Performed by: INTERNAL MEDICINE

## 2021-11-04 PROCEDURE — 99214 OFFICE O/P EST MOD 30 MIN: CPT | Performed by: INTERNAL MEDICINE

## 2021-11-04 RX ORDER — BETAMETHASONE SODIUM PHOSPHATE AND BETAMETHASONE ACETATE 3; 3 MG/ML; MG/ML
6 INJECTION, SUSPENSION INTRA-ARTICULAR; INTRALESIONAL; INTRAMUSCULAR; SOFT TISSUE ONCE
Status: COMPLETED | OUTPATIENT
Start: 2021-11-04 | End: 2021-11-04

## 2021-11-04 RX ORDER — BUPIVACAINE HYDROCHLORIDE 2.5 MG/ML
4 INJECTION, SOLUTION INFILTRATION; PERINEURAL ONCE
Status: COMPLETED | OUTPATIENT
Start: 2021-11-04 | End: 2021-11-04

## 2021-11-04 RX ORDER — METHYLPREDNISOLONE 4 MG/1
TABLET ORAL
Qty: 1 KIT | Refills: 0 | Status: SHIPPED | OUTPATIENT
Start: 2021-11-04 | End: 2022-01-06 | Stop reason: CLARIF

## 2021-11-04 RX ADMIN — BUPIVACAINE HYDROCHLORIDE 10 MG: 2.5 INJECTION, SOLUTION INFILTRATION; PERINEURAL at 17:01

## 2021-11-04 RX ADMIN — BETAMETHASONE SODIUM PHOSPHATE AND BETAMETHASONE ACETATE 6 MG: 3; 3 INJECTION, SUSPENSION INTRA-ARTICULAR; INTRALESIONAL; INTRAMUSCULAR; SOFT TISSUE at 17:02

## 2021-11-04 NOTE — PROGRESS NOTES
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 male presents with the above complaint. The symptoms began 3 weeksago and started without an injury. The patient describes a sharp pain that does radiate. The symptoms are intermittent  and are show no change since the onset. Pain localized to the right shoulder but he has pain that radiates distally below the elbow and proximally into the neck and is experiencing some low-grade neck discomfort. The neck pain is location: Right sided and is worsened by nothing in particular. He will occasionally experience headache pain related to this. The patient has not noted new onset or progressive weakness of the upper extremites. The neck pain : arm pain is 90 : 10 and follows a C6 and C7 dermatomal pattern. Treatment to date has included none and symptoms have not improved with this treatment. The patient has history of neck trauma. 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    The symptoms do not show a typical rotator cuff provacative pattern. The pain is diffuse about the shoulder. There is not associated mechanical symptoms localizing to the shoulder. The patient denies symptoms of instability about the shoulder. The symptoms do not correlate with head and neck movement. The patient denies new onset weakness of the upper extremity. The patient does not have history of prior shoulder trauma. There is no history or autoimmune disease, inflammatory arthropathy or crystal arthropathy.                       Past Medical History:        Past Medical History:   Diagnosis Date    Allergic rhinitis     Anxiety     Asthma     COPD (chronic obstructive pulmonary disease) ([facility] [address]     Hyperlipidemia     Hypertension     Hypothyroidism     Soft tissue sarcoma (HCC)          Past Surgical History:   Procedure Laterality Date    COLONOSCOPY  8./16/2007    BN - 10 year f/u    SINUS SURGERY      SKIN CANCER EXCISION           Present Medications:         Current Outpatient Medications   Medication Sig Dispense Refill    methylPREDNISolone (MEDROL, ALEXIA,) 4 MG tablet By mouth. 1 kit 0    acetaminophen (TYLENOL) 500 MG tablet Take 2 tablets by mouth 3 times daily as needed for Pain      tiZANidine (ZANAFLEX) 2 MG tablet Take 1 tablet by mouth 2 times daily as needed (pain) 30 tablet 0    Multiple Vitamin (MULTIVITAMIN ADULT) TABS Take 1 tablet by mouth daily      levothyroxine (SYNTHROID) 100 MCG tablet TAKE 1 TABLET BY MOUTH EVERY DAY 90 tablet 1    desoximetasone (TOPICORT) 0.25 % cream APPLY TO AFFECTED AREA(S) TWO TIMES A DAY FOR 2 WEEKS OR UNTIL IMPROVED 60 g 2    ALPRAZolam (XANAX) 1 MG tablet TAKE 1/2 TABLET ONCE A DAY AND ONE TABLET AT NIGHT 45 tablet 0    albuterol sulfate HFA (PROAIR HFA) 108 (90 Base) MCG/ACT inhaler Inhale 2 puffs into the lungs every 6 hours as needed for Wheezing or Shortness of Breath 1 Inhaler 5    SYMBICORT 160-4.5 MCG/ACT AERO TAKE 2 PUFFS BY MOUTH TWICE A DAY 10.2 Inhaler 3    atorvastatin (LIPITOR) 40 MG tablet TAKE 1 TABLET BY MOUTH EVERY DAY AT NIGHT 90 tablet 1    allopurinol (ZYLOPRIM) 100 MG tablet Take 1 tablet by mouth daily 30 tablet 3    Blood Pressure Monitoring (BLOOD PRESSURE MONITOR/M CUFF) MISC Use to monitor blood pressure 1 each 0    clobetasol (TEMOVATE) 0.05 % ointment Apply to affected areas of the skin twice daily until improved.  60 g 2    colchicine (COLCRYS) 0.6 MG tablet Take 1 tablet by mouth daily PRN      carvedilol (COREG) 6.25 MG tablet Take 1 tablet by mouth Examinations:           Neurologic -Light touch sensation and manual muscle testing is normal C5-C8 . Biceps and triceps reflexes are symmetric and +2. Primary Exam:    Inspection: No deformity atrophy appreciable effusion      Palpation: No focal tenderness      Range of Motion: Full range and symmetric pain with internal rotation reproducing a component of his shoulder pain      Strength: Normal without pain      Special Tests: Subscapularis signs negative, impingement sign negative, AC provocative negative    Skin: There are no rashes, ulcerations or lesions. Gait: Nonantalgic      Reflex intact upper     Additional Comments:        Additional Examinations:           Left Upper Extremity: Examination of the left upper extremity does not show any tenderness, deformity or injury. Range of motion is unremarkable. There is no gross instability. There are no rashes, ulcerations or lesions. Strength and tone are normal.         Office Imaging Results/Procedures PerformedToday:      Radiology:      X-rays obtained and reviewed in office:   Views 2 views cervical spine   Location cervical spine   Impression relative straightening of the cervical spine and multilevel mild to moderate intervertebral disc space narrowing and mild to moderate spondylosis C4-C7.   Prevertebral soft tissues have a normal radiographic appearance no evidence of spondylolisthesis    3 views of the shoulder glenohumeral joint has a normal radiographic appearance type I acromion morphology incidental note of sternotomy wires mild AC joint arthropathy no other soft tissue or osseous abnormalities       Office Procedures:     Orders Placed This Encounter   Procedures    XR CERVICAL SPINE (2-3 VIEWS)     Standing Status:   Future     Number of Occurrences:   1     Standing Expiration Date:   11/4/2022     Order Specific Question:   Reason for exam:     Answer:   neck pain    XR SHOULDER RIGHT (MIN 2 VIEWS)     Standing Status:   Future Number of Occurrences:   1     Standing Expiration Date:   11/4/2022     Order Specific Question:   Reason for exam:     Answer:   pain    MRI CERVICAL SPINE [address] in [place], [place], pt will call to schedule  [address] will obtain auth and fwd to your facility  Pt advised to f/u 1-2 days after MRI for results, please fax results to [phone]     Standing Status:   Future     Standing Expiration Date:   11/4/2022     Scheduling Instructions:      Please give MRI images to pt on a disc, fax results to [phone]     Order Specific Question:   Reason for exam:     Answer:   r/o HNP, stenosis    US GUIDED NEEDLE PLACEMENT     Standing Status:   Future     Number of Occurrences:   1     Standing Expiration Date:   11/4/2022     Order Specific Question:   Reason for exam:     Answer:   CSI    NM ARTHROCENTESIS ASPIR&/INJ MAJOR JT/BURSA W/O US       Logic E ultrasound/ 10 HZ      Ultrasound-guided injection of the shoulder    The patient was placed supine on the examination table with the    RT          upper extremity slightly internally rotated. The ultrasound was placed on shoulder present function image optimization was obtained using the linear transducer. The transducer was placed in a coronal oblique position over the anterolateral shoulder and the rotator cuff was easily identifiable. After sterile preparation and the use of topical anesthetic, 25-gauge needle was advanced using longitudinal technique just superficial to the rotator cuff tendon and the subacromial bursa was hydrodissected with 4 mL of Marcaine and  1 mL of Celestone. The patient tolerated this with minimal discomfort. Band-Aid tooth puncture wound. Image stored. Technically successful injection        Other Outside Imaging and Testing Personally Reviewed:    XR CERVICAL SPINE (2-3 VIEWS)    Result Date: 11/4/2021  Radiology exam is complete. No Radiologist dictation.  Please follow up with ordering provider. XR SHOULDER RIGHT (MIN 2 VIEWS)    Result Date: 11/4/2021  Radiology exam is complete. No Radiologist dictation. Please follow up with ordering provider. US GUIDED NEEDLE PLACEMENT    Result Date: 11/4/2021  Radiology result is complete; follow up with provider / physician office for radiology results              Assessment   Impression: . Encounter Diagnoses   Name Primary?  Strain of right rotator cuff capsule, initial encounter     Bursitis of right shoulder     Radiculitis of right cervical region     DDD (degenerative disc disease), cervical     Cervical spondylosis     Neck pain     Right shoulder pain, unspecified chronicity               Plan: Activity modification rotator cuff protocol  Complete ultrasound evaluation of the right shoulder as needed  Formal course of PT rotator cuff conditioning as needed  MRI evaluation cervical spine evaluate severity of rightward compressive discopathy/stenosis suspected clinically  Medrol Dosepak for for therapeutic benefit  Activity modification cervical disc protocol      The nature of the finding, probable diagnosis and likely treatment was thoroughly discussed with the patient. The options, risks, complications, alternative treatment as well as some of the differential diagnosis was discussed. The patient was thoroughly informed and all questions were answered. the patient indicated understanding and satisfaction with the discussion.       Orders:        Orders Placed This Encounter   Procedures    XR CERVICAL SPINE (2-3 VIEWS)     Standing Status:   Future     Number of Occurrences:   1     Standing Expiration Date:   11/4/2022     Order Specific Question:   Reason for exam:     Answer:   neck pain    XR SHOULDER RIGHT (MIN 2 VIEWS)     Standing Status:   Future     Number of Occurrences:   1     Standing Expiration Date:   11/4/2022     Order Specific Question:   Reason for exam:     Answer:   pain    MRI CERVICAL SPINE [address] in [facility], pt will call to schedule  [facility] will obtain auth and fwd to your facility  Pt advised to f/u 1-2 days after MRI for results, please fax results to [phone]     Standing Status:   Future     Standing Expiration Date:   11/4/2022     Scheduling Instructions:      Please give MRI images to pt on a disc, fax results to [phone]     Order Specific Question:   Reason for exam:     Answer:   r/o HNP, stenosis    US GUIDED NEEDLE PLACEMENT     Standing Status:   Future     Number of Occurrences:   1     Standing Expiration Date:   11/4/2022     Order Specific Question:   Reason for exam:     Answer:   CSI    AK ARTHROCENTESIS ASPIR&/INJ MAJOR JT/BURSA W/O US           Disclaimer: \"This note was dictated with voice recognition software. Though review and correction are routine, we apologize for any errors. \"

## 2021-11-05 ENCOUNTER — CARE COORDINATION (OUTPATIENT)
Dept: CARE COORDINATION | Age: 71
End: 2021-11-05

## 2021-11-05 NOTE — CARE COORDINATION
Placed third and final phone call to reach patient for the purpose of CM FU. He was not available. The last two phone calls placed to patient with messages left he did not return them. Plan  Graduate from [address] [place] [address],[address].  Jen Yeager RN, BSN, [address] [place] [address] Primary Care  [phone]

## 2021-11-08 LAB
6-ACETYLMORPHINE: NOT DETECTED
7-AMINOCLONAZEPAM: NOT DETECTED
ALPHA-OH-ALPRAZOLAM: PRESENT
ALPHA-OH-MIDAZOLAM, URINE: NOT DETECTED
ALPRAZOLAM: PRESENT
AMPHETAMINE: NOT DETECTED
BARBITURATES: NOT DETECTED
BENZOYLECGONINE: NOT DETECTED
BUPRENORPHINE: NOT DETECTED
CARISOPRODOL: NOT DETECTED
CLONAZEPAM: NOT DETECTED
CODEINE: NOT DETECTED
CREATININE URINE: 84.5 MG/DL (ref 20–400)
DIAZEPAM: NOT DETECTED
DRUGS EXPECTED: NORMAL
EER PAIN MGT DRUG PANEL, HIGH RES/EMIT U: NORMAL
ETHYL GLUCURONIDE: PRESENT
FENTANYL: NOT DETECTED
GABAPENTIN: NOT DETECTED
HYDROCODONE: NOT DETECTED
HYDROMORPHONE: NOT DETECTED
LORAZEPAM: NOT DETECTED
MARIJUANA METABOLITE: NOT DETECTED
MDA: NOT DETECTED
MDEA: NOT DETECTED
MDMA URINE: NOT DETECTED
MEPERIDINE: NOT DETECTED
METHADONE: NOT DETECTED
METHAMPHETAMINE: NOT DETECTED
METHYLPHENIDATE: NOT DETECTED
MIDAZOLAM: NOT DETECTED
MORPHINE: NOT DETECTED
NALOXONE: NOT DETECTED
NORBUPRENORPHINE, FREE: NOT DETECTED
NORDIAZEPAM: NOT DETECTED
NORFENTANYL: NOT DETECTED
NORHYDROCODONE, URINE: NOT DETECTED
NOROXYCODONE: NOT DETECTED
NOROXYMORPHONE, URINE: NOT DETECTED
OXAZEPAM: NOT DETECTED
OXYCODONE: NOT DETECTED
OXYMORPHONE: NOT DETECTED
PAIN MANAGEMENT DRUG PANEL: NORMAL
PAIN MANAGEMENT DRUG PANEL: NORMAL
PCP: NOT DETECTED
PHENTERMINE: NOT DETECTED
PREGABALIN: NOT DETECTED
TAPENTADOL, URINE: NOT DETECTED
TAPENTADOL-O-SULFATE, URINE: NOT DETECTED
TEMAZEPAM: NOT DETECTED
TRAMADOL: NOT DETECTED
ZOLPIDEM: NOT DETECTED

## 2021-11-10 ENCOUNTER — OFFICE VISIT (OUTPATIENT)
Dept: DERMATOLOGY | Age: 71
End: 2021-11-10
Payer: MEDICARE

## 2021-11-10 VITALS — TEMPERATURE: 98.4 F

## 2021-11-10 DIAGNOSIS — R21 RASH: Primary | ICD-10-CM

## 2021-11-10 PROCEDURE — 11102 TANGNTL BX SKIN SINGLE LES: CPT | Performed by: DERMATOLOGY

## 2021-11-10 PROCEDURE — 11900 INJECT SKIN LESIONS </W 7: CPT | Performed by: DERMATOLOGY

## 2021-11-10 NOTE — PATIENT INSTRUCTIONS
Biopsy Wound Care Instructions    · Keep the bandage in place for 24 hours. · Cleanse the wound with mild soapy water daily   Gently dry the area.  Apply Vaseline or petroleum jelly to the wound using a cotton tipped applicator.  Cover with a clean bandage.  Repeat this process until the biopsy site is healed.  If you had stitches placed, continue treating the site until the stitches are removed. Remember to make an appointment to return to have your stitches removed by our staff.  You may shower and bathe as usual.       ** Biopsy results generally take around 7 business days to come back. If you have not heard from us by then, please call the office at [phone]. *Please note that biopsy results are released to both the patient and physician at the same time in [address] Please allow time for your physician to review the results. One of our staff members will reach out to you with the results and plan.

## 2021-11-10 NOTE — PROGRESS NOTES
[facility]at[facility]  Valarie Castellano MD  [address]  1950    70 y.o. male     Date of Visit: 11/10/2021    Chief Complaint: rash    History of Present Illness:    1. He returns today to follow up for persistent plaques on the abdomen and right thigh. Unfortunately Topicort 0.25% cream has not helped. He reports marked temporary improvement of the right lateral thigh lesion with intralesional Kenalog. Biopsy from 8/5/21 revealed chronic dermatitis without any characteristic features of CTCL. Review of Systems:  Gen: Feels well, good sense of health. Past Medical History, Family History, Surgical History, Medications and Allergies reviewed. Past Medical History:   Diagnosis Date    Allergic rhinitis     Anxiety     Asthma     COPD [facility]ch[facility] obstructive pulmonary disease) ([facility] [address]     Hyperlipidemia     Hypertension     Hypothyroidism     Soft tissue sarcoma ([facility] [address]      Past Surgical History:   Procedure Laterality Date    COLONOSCOPY  8./16/2007    BN - 10 year f/u    SINUS SURGERY      SKIN CANCER EXCISION         Allergies   Allergen Reactions    Latex Rash    Contrast [Gadolinium Derivatives] Shortness Of Breath and Anxiety    Soap Rash     Outpatient Medications Marked as Taking for the 11/10/21 encounter (Office Visit) with Odalys Stevens MD   Medication Sig Dispense Refill    methylPREDNISolone (MEDROL, ALEXIA,) 4 MG tablet By mouth.  1 kit 0    acetaminophen (TYLENOL) 500 MG tablet Take 2 tablets by mouth 3 times daily as needed for Pain      tiZANidine (ZANAFLEX) 2 MG tablet Take 1 tablet by mouth 2 times daily as needed (pain) 30 tablet 0    Multiple Vitamin (MULTIVITAMIN ADULT) TABS Take 1 tablet by mouth daily      levothyroxine (SYNTHROID) 100 MCG tablet TAKE 1 TABLET BY MOUTH EVERY DAY 90 tablet 1    desoximetasone (TOPICORT) 0.25 % cream APPLY TO AFFECTED AREA(S) TWO TIMES A DAY FOR 2 WEEKS OR UNTIL IMPROVED 60 g 2    albuterol sulfate HFA (PROAIR HFA) 108 (90 Base) MCG/ACT inhaler Inhale 2 puffs into the lungs every 6 hours as needed for Wheezing or Shortness of Breath 1 Inhaler 5    SYMBICORT 160-4.5 MCG/ACT AERO TAKE 2 PUFFS BY MOUTH TWICE A DAY 10.2 Inhaler 3    atorvastatin (LIPITOR) 40 MG tablet TAKE 1 TABLET BY MOUTH EVERY DAY AT NIGHT 90 tablet 1    allopurinol (ZYLOPRIM) 100 MG tablet Take 1 tablet by mouth daily 30 tablet 3    Blood Pressure Monitoring (BLOOD PRESSURE MONITOR/M CUFF) MISC Use to monitor blood pressure 1 each 0    colchicine (COLCRYS) 0.6 MG tablet Take 1 tablet by mouth daily PRN      carvedilol (COREG) 6.25 MG tablet Take 1 tablet by mouth 2 times daily (with meals)      torsemide (DEMADEX) 20 MG tablet Take 2 tablets by mouth daily      losartan (COZAAR) 25 MG tablet Take 25 mg by mouth 2 times daily       spironolactone (ALDACTONE) 25 MG tablet Take 0.5 tablets by mouth daily      potassium chloride (KLOR-CON M) 10 MEQ extended release tablet Take 1 tablet by mouth daily 30 tablet 3    albuterol (PROVENTIL) (2.5 MG/3ML) 0.083% nebulizer solution Take 2.5 mg by nebulization every 6 hours as needed for Wheezing or Shortness of Breath         Physical Examination       Well-appearing. 1.  Left abdomen and right lateral thigh with well-defined geographic and round brightly erythematous scaly thin plaques. Medial aspect of the thighs, shins and also on the left forearm and left upper arm are several well-defined faintly scaly pink patches. Assessment and Plan     1. 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 Risks reviewed including discomfort, bleeding, scar and infection. The area(s) to be biopsied were marked with a surgical pen. Alcohol was used to cleanse the site. Local anesthesia was acheived with 1% lidocaine with epinephrine.  Long shave biopsy was performed on a patch on the right medial leg (has not used steroids here in the last 2 weeks) using a razor blade. Hemostasis was achieved with aluminum chloride. The wound(s) were dressed with petrolatum and covered with a bandage. Wound care instructions were reviewed. 1 Specimen (s) sent to pathology. The specimen bottles were appropriately labeled.     Intralesional Kenalog 5 mg/mL - 2 mL injected into 1 plaque on the right lateral thigh.          --Minnie Hernandez MD

## 2021-11-12 LAB
AGE TIME: 71 YRS
ALBUMIN SERPL-MCNC: NORMAL G/DL
ALP BLD-CCNC: NORMAL U/L
ALT SERPL-CCNC: NORMAL U/L
ANION GAP SERPL CALCULATED.3IONS-SCNC: 9 MMOL/L
ANION GAP SERPL CALCULATED.3IONS-SCNC: 9 MMOL/L (ref 7–16)
AST SERPL-CCNC: NORMAL U/L
B-TYPE NATRIURETIC PEPTIDE: 149 PG/ML
BASIC METABOLIC PANEL: ABNORMAL
BILIRUB SERPL-MCNC: NORMAL MG/DL
BUN BLDV-MCNC: 29 MG/DL
BUN BLDV-MCNC: 29 MG/DL (ref 7–20)
CALCIUM SERPL-MCNC: 9.6 MG/DL
CALCIUM SERPL-MCNC: 9.6 MG/DL (ref 8.5–10.1)
CHLORIDE BLD-SCNC: 105 MMOL/L
CHLORIDE BLD-SCNC: 105 MMOL/L (ref 98–108)
CO2: 30.3 MMOL/L
CO2: 30.3 MMOL/L (ref 21–32)
CREAT SERPL-MCNC: 1.43 MG/DL
CREAT SERPL-MCNC: 1.43 MG/DL (ref 0.7–1.3)
GFR AFRICAN AMERICAN: 59 ML/MIN
GFR CALCULATED: 49
GFR NON-AFRICAN AMERICAN: 49 ML/MIN
GLUCOSE BLD-MCNC: 95 MG/DL
GLUCOSE BLD-MCNC: 95 MG/DL (ref 70–110)
POTASSIUM SERPL-SCNC: 4.8 MMOL/L
POTASSIUM SERPL-SCNC: 4.8 MMOL/L (ref 3.6–5)
SODIUM BLD-SCNC: 144 MMOL/L
SODIUM BLD-SCNC: 144 MMOL/L (ref 137–148)
TOTAL PROTEIN: NORMAL

## 2021-11-15 DIAGNOSIS — I10 ESSENTIAL HYPERTENSION: ICD-10-CM

## 2021-11-15 DIAGNOSIS — N18.31 STAGE 3A CHRONIC KIDNEY DISEASE (HCC): ICD-10-CM

## 2021-11-15 DIAGNOSIS — F41.1 GENERALIZED ANXIETY DISORDER: ICD-10-CM

## 2021-11-15 DIAGNOSIS — R53.83 FATIGUE, UNSPECIFIED TYPE: ICD-10-CM

## 2021-11-15 DIAGNOSIS — E78.2 MIXED HYPERLIPIDEMIA: ICD-10-CM

## 2021-11-15 RX ORDER — ALPRAZOLAM 1 MG/1
TABLET ORAL
Qty: 45 TABLET | Refills: 1 | Status: SHIPPED | OUTPATIENT
Start: 2021-11-15 | End: 2022-01-20

## 2021-11-15 NOTE — TELEPHONE ENCOUNTER
Medication:   Requested Prescriptions     Pending Prescriptions Disp Refills    ALPRAZolam (XANAX) 1 MG tablet [Pharmacy Med Name: ALPRAZOLAM 1 MG TABLET] 45 tablet 0     Sig: TAKE 1/2 TABLET EVERY DAY AND THEN TAKE 1 TABLET AT BEDTIME     Last Filled:  10.4.21    Last appt: 11/2/2021   Next appt: 12/16/2021    Last OARRS:   RX Monitoring 10/4/2021   Attestation -   Periodic Controlled Substance Monitoring No signs of potential drug abuse or diversion identified.

## 2021-11-17 DIAGNOSIS — M54.2 NECK PAIN: ICD-10-CM

## 2021-11-17 LAB — DERMATOLOGY PATHOLOGY REPORT: NORMAL

## 2021-11-17 RX ORDER — TIZANIDINE 2 MG/1
2 TABLET ORAL 2 TIMES DAILY PRN
Qty: 60 TABLET | Refills: 3 | Status: SHIPPED | OUTPATIENT
Start: 2021-11-17 | End: 2022-06-09 | Stop reason: ALTCHOICE

## 2021-11-17 NOTE — TELEPHONE ENCOUNTER
Medication:   Requested Prescriptions     Pending Prescriptions Disp Refills    tiZANidine (ZANAFLEX) 2 MG tablet 30 tablet 0     Sig: Take 1 tablet by mouth 2 times daily as needed (pain)     Last Filled: 11.2.21    Last appt: 11/2/2021   Next appt: 12/16/2021    Last OARRS:   RX Monitoring 10/4/2021   Attestation -   Periodic Controlled Substance Monitoring No signs of potential drug abuse or diversion identified.

## 2021-11-22 ENCOUNTER — TELEPHONE (OUTPATIENT)
Dept: DERMATOLOGY | Age: 71
End: 2021-11-22

## 2021-11-22 NOTE — TELEPHONE ENCOUNTER
Informed patient of biopsy results. Patient verbalized understanding. There is a local tanning bed that he would be willing to go to. He is asking for a tanning bed schedule that he can follow. Please advise.

## 2021-11-22 NOTE — TELEPHONE ENCOUNTER
Pt of    Pt lvm returning call he had received, thinks it maybe regarding his results. Please advise, Thank you!

## 2021-11-23 NOTE — TELEPHONE ENCOUNTER
Informed patient of Dr Edda Michael response. He asked what the duration of his first treatment should be. Per Dr Rene Morton he should start off at just a few minutes and then gradually increase as long as he is not experiencing any burning. He should wear goggles to protect his eyes and a towel on his face. Patient verbalized understanding.

## 2021-11-24 DIAGNOSIS — E78.2 MIXED HYPERLIPIDEMIA: ICD-10-CM

## 2021-11-24 RX ORDER — ATORVASTATIN CALCIUM 40 MG/1
TABLET, FILM COATED ORAL
Qty: 90 TABLET | Refills: 1 | Status: SHIPPED | OUTPATIENT
Start: 2021-11-24 | End: 2022-05-31

## 2021-11-24 NOTE — TELEPHONE ENCOUNTER
Medication:   Requested Prescriptions     Pending Prescriptions Disp Refills    atorvastatin (LIPITOR) 40 MG tablet [Pharmacy Med Name: ATORVASTATIN 40 MG TABLET] 90 tablet 1     Sig: TAKE 1 TABLET BY MOUTH EVERY DAY AT NIGHT     Last Filled:  06/10/21    Last appt: 11/2/2021   Next appt: 12/16/2021    Last Lipid:   Lab Results   Component Value Date    CHOL 161 08/16/2021    TRIG 106 08/16/2021    HDL 62 08/16/2021    [address] 81 06/03/2015

## 2021-11-28 DIAGNOSIS — J44.9 CHRONIC OBSTRUCTIVE PULMONARY DISEASE, UNSPECIFIED COPD TYPE (HCC): ICD-10-CM

## 2021-11-29 RX ORDER — BUDESONIDE AND FORMOTEROL FUMARATE DIHYDRATE 160; 4.5 UG/1; UG/1
AEROSOL RESPIRATORY (INHALATION)
Qty: 10.2 EACH | Refills: 3 | Status: SHIPPED | OUTPATIENT
Start: 2021-11-29 | End: 2022-03-10 | Stop reason: SDUPTHER

## 2021-11-29 NOTE — TELEPHONE ENCOUNTER
Medication:   Requested Prescriptions     Pending Prescriptions Disp Refills    SYMBICORT 160-4.5 MCG/ACT AERO [Pharmacy Med Name: Sarah Garcia 160-4.5 MCG INHALER] 10.2 each 3     Sig: TAKE 2 PUFFS BY MOUTH TWICE A DAY     Last Filled: 7.29.21    Last appt: 11/2/2021   Next appt: 12/16/2021    Last OARRS:   RX Monitoring 10/4/2021   Attestation -   Periodic Controlled Substance Monitoring No signs of potential drug abuse or diversion identified.     Medication:

## 2021-12-02 ENCOUNTER — OFFICE VISIT (OUTPATIENT)
Dept: ORTHOPEDIC SURGERY | Age: 71
End: 2021-12-02
Payer: MEDICARE

## 2021-12-02 DIAGNOSIS — M54.12 RADICULITIS OF RIGHT CERVICAL REGION: ICD-10-CM

## 2021-12-02 DIAGNOSIS — M75.51 BURSITIS OF RIGHT SHOULDER: ICD-10-CM

## 2021-12-02 DIAGNOSIS — M47.812 CERVICAL SPONDYLOSIS: ICD-10-CM

## 2021-12-02 DIAGNOSIS — S46.011A STRAIN OF RIGHT ROTATOR CUFF CAPSULE, INITIAL ENCOUNTER: ICD-10-CM

## 2021-12-02 DIAGNOSIS — M50.30 DDD (DEGENERATIVE DISC DISEASE), CERVICAL: ICD-10-CM

## 2021-12-02 DIAGNOSIS — M48.02 FORAMINAL STENOSIS OF CERVICAL REGION: ICD-10-CM

## 2021-12-02 PROCEDURE — 99214 OFFICE O/P EST MOD 30 MIN: CPT | Performed by: INTERNAL MEDICINE

## 2021-12-02 NOTE — PROGRESS NOTES
Chief Complaint:   Chief Complaint   Patient presents with    Neck Pain    Shoulder Pain          History of Present Illness:       Patient is a 70 y.o. male returns follow up for the above complaint. The patient was last seen approximately 3 weeksago. The symptoms are stable since the last visit. The patient has had further testing for the problem. He did not complete the course of Medrol that was ordered. In the interim MRI scan of the cervical spine was completed which is outlined below in detail    Overall shoulder remains problematic despite recent ultrasound-guided SA CSI    This localizes to the lateral and posterior lateral region of the shoulder primarily and follows a typical rotator cuff provocative pattern he denies any subjective instability or mechanical symptoms    With respect to his neck continues to experience episodic pain that can radiate from the upper trapezius into the neck and resulted in a \"migraine headache\". He has no prior history of headache syndrome. Pain levels 8/10         Past Medical History:        Past Medical History:   Diagnosis Date    Allergic rhinitis     Anxiety     Asthma     COPD (chronic obstructive pulmonary disease) (HCC)     Hyperlipidemia     Hypertension     Hypothyroidism     Soft tissue sarcoma (HCC)         Present Medications:         Current Outpatient Medications   Medication Sig Dispense Refill    SYMBICORT 160-4.5 MCG/ACT AERO TAKE 2 PUFFS BY MOUTH TWICE A DAY 10.2 each 3    atorvastatin (LIPITOR) 40 MG tablet TAKE 1 TABLET BY MOUTH EVERY DAY AT NIGHT 90 tablet 1    tiZANidine (ZANAFLEX) 2 MG tablet Take 1 tablet by mouth 2 times daily as needed (pain) 60 tablet 3    ALPRAZolam (XANAX) 1 MG tablet TAKE 1/2 TABLET EVERY DAY AND THEN TAKE 1 TABLET AT BEDTIME 45 tablet 1    methylPREDNISolone (MEDROL, ALEXIA,) 4 MG tablet By mouth.  1 kit 0    acetaminophen (TYLENOL) 500 MG tablet Take 2 tablets by mouth 3 times daily as needed for Pain      Multiple Vitamin (MULTIVITAMIN ADULT) TABS Take 1 tablet by mouth daily      levothyroxine (SYNTHROID) 100 MCG tablet TAKE 1 TABLET BY MOUTH EVERY DAY 90 tablet 1    desoximetasone (TOPICORT) 0.25 % cream APPLY TO AFFECTED AREA(S) TWO TIMES A DAY FOR 2 WEEKS OR UNTIL IMPROVED 60 g 2    albuterol sulfate HFA (PROAIR HFA) 108 (90 Base) MCG/ACT inhaler Inhale 2 puffs into the lungs every 6 hours as needed for Wheezing or Shortness of Breath 1 Inhaler 5    allopurinol (ZYLOPRIM) 100 MG tablet Take 1 tablet by mouth daily 30 tablet 3    Blood Pressure Monitoring (BLOOD PRESSURE MONITOR/M CUFF) MISC Use to monitor blood pressure 1 each 0    clobetasol (TEMOVATE) 0.05 % ointment Apply to affected areas of the skin twice daily until improved. (Patient not taking: Reported on 11/10/2021) 60 g 2    colchicine (COLCRYS) 0.6 MG tablet Take 1 tablet by mouth daily PRN      carvedilol (COREG) 6.25 MG tablet Take 1 tablet by mouth 2 times daily (with meals)      torsemide (DEMADEX) 20 MG tablet Take 2 tablets by mouth daily      losartan (COZAAR) 25 MG tablet Take 25 mg by mouth 2 times daily       spironolactone (ALDACTONE) 25 MG tablet Take 0.5 tablets by mouth daily      potassium chloride (KLOR-CON M) 10 MEQ extended release tablet Take 1 tablet by mouth daily 30 tablet 3    albuterol (PROVENTIL) (2.5 MG/3ML) 0.083% nebulizer solution Take 2.5 mg by nebulization every 6 hours as needed for Wheezing or Shortness of Breath       Current Facility-Administered Medications   Medication Dose Route Frequency Provider Last Rate Last Admin    triamcinolone acetonide (KENALOG) injection 10 mg  10 mg Intra-LESional Once Hubert Dang MD             Allergies: Allergies   Allergen Reactions    Latex Rash    Contrast [Gadolinium Derivatives] Shortness Of Breath and Anxiety    Soap Rash           Review of Systems:    Pertinent items are noted in HPI         Vital Signs:     There were no vitals filed for this visit. General Exam:     Constitutional: Patient is adequately groomed with no evidence of malnutrition    Physical Exam: right shoulder      Primary Exam:    Inspection: No deformity atrophy appreciable effusion      Palpation: No focal tenderness      Range of Motion: Full range and symmetric low-grade pain forward elevation extreme and abduction and with internal rotation      Strength: Normal in all ranges with negligible to no discomfort      Special Tests: Impingement sign negative      Skin: There are no rashes, ulcerations or lesions. Gait: Nonantalgic    Neurovascular - non focal and intact       Additional Comments:        Additional Examinations:             Neck: Examination of the neck does not show any tenderness, deformity or injury. Mild global restriction low-grade discomfort endrange flexion. .  There is no gross instability. There are no rashes, ulcerations or lesions. Strength and tone are normal.  Neurologic -Light touch sensation and manual muscle testing is normal C5-C8 . Biceps and triceps reflexes are symmetric and +2. Spurlling sign is negative   Godwin sign negative           Office Imaging Results/Procedures PerformedToday:             Office Procedures:     Orders Placed This Encounter   Procedures    US EXTREMITY JOINT RIGHT NON VASC COMPLETE     Standing Status:   Future     Standing Expiration Date:   12/2/2022     Order Specific Question:   Reason for exam:     Answer:   dx    [facility]     Referral Priority:   Routine     Referral Type:   Eval and Treat     Referral Reason:   Specialty Services Required     Requested Specialty:   Physical Therapy     Number of Visits Requested:   1           Other Outside Imaging and Testing Personally Reviewed:    Complete Ultrasound Shoulder  Logic E ultrasound  12 MHz    The patient was position seated on examination table.   The anterior soft tissues of the RT shoulder were evaluated initially using linear transducer. Image optimization was obtained. The proximal biceps tendon was evaluated extensively and short axis and long axis and was noted to have normal sonographic echotexture without evidence of high-grade tendinopathy or discrete tear. With dynamic imaging there was no evidence of instability of the proximal biceps tendon. There was evidence of a small cystic structure at the posterior the proximal biceps tendon at the level of the proximal bicipital groove measuring 0.9 cm² more than likely representative of a degenerative cyst from the deep surface of the transverse humeral ligament. No mass-effect on the biceps tendon. The subscapularis was then evaluated extensively and short axis and long axis. The tendon was noted to have normal caliber/volume without evidence of high-grade tendinopathy or discrete tear. The patient was then positioned in CRASS position and the supraspinatus and infraspinatus tendons were evaluated extensively and short axis and long axis. The supraspinatus had a normal sonographic echotexture without evidence of high-grade tendinopathy or discrete tear. The infraspinatus had a normal sonographic appearance without evidence of high-grade tendinopathy or discrete tear. Power Doppler imaging negative    The subacromial bursa was mildly thickened consistent with bursitis. Dynamic impingement test revealed no evidence of soft tissue subacromial impingement. Using virtual convex imaging, the posterior glenohumeral joint was visualized. There was no evidence of intra-articular effusion. The posterior labrum was identified but not well delineated on this particular examination. There was no evidence of intra-articular effusion. The acromial clavicular joint revealed low-grade degenerative change. There was no evidence of soft tissue mass or cystic lesions. Assessment   Impression: . Encounter Diagnoses   Name Primary?     Cervical spondylosis     Foraminal stenosis of cervical region     Radiculitis of right cervical region     DDD (degenerative disc disease), cervical     Strain of right rotator cuff capsule, initial encounter     Bursitis of right shoulder               Plan:       Complete the course of Medrol previously ordered  Cervical stabilization program and physical therapy and rotator cuff conditioning program  Preliminary cervical position home exercises and rotator cuff stretching program provided today  Activity modification rotator cuff protocol and cervical disc protocol  Consider repeat ultrasound-guided subacromial injection with steroid only for escalating pain levels      Overall I believe the shoulder pain is predominantly related to rotator cuff impingement syndrome/strain and there may be a component of shoulder pain that is radicular but this is not highly suggestive based on today's clinical examination. Proceed as outlined above     Orders:        Orders Placed This Encounter   Procedures    US EXTREMITY JOINT RIGHT NON VASC COMPLETE     Standing Status:   Future     Standing Expiration Date:   12/2/2022     Order Specific Question:   Reason for exam:     Answer:   dx    [facility]     Referral Priority:   Routine     Referral Type:   Eval and Treat     Referral Reason:   Specialty Services Required     Requested Specialty:   Physical Therapy     Number of Visits Requested:   1         Cleveland Logan MD.      Vidal Almanza: \"This note was dictated with voice recognition software. Though review and correction are routine, we apologize for any errors. \"

## 2021-12-13 ENCOUNTER — HOSPITAL ENCOUNTER (OUTPATIENT)
Dept: PHYSICAL THERAPY | Age: 71
Setting detail: THERAPIES SERIES
Discharge: HOME | End: 2021-12-13

## 2021-12-13 NOTE — FLOWSHEET NOTE
Physical Therapy  Cancellation/No-show Note  Patient Name:  Marvin Velazquez  :  1950   Date:  2021  Cancelled visits to date: 0  No-shows to date: 1    For today's appointment patient:  []  Cancelled  []  Rescheduled appointment  [x]  No-show     Reason given by patient:  []  Patient ill  []  Conflicting appointment  []  No transportation    []  Conflict with work  []  No reason given  []  Other:     Comments:      Electronically signed by:  Yudith Segundo PT, PT

## 2021-12-16 ENCOUNTER — OFFICE VISIT (OUTPATIENT)
Dept: PRIMARY CARE CLINIC | Age: 71
End: 2021-12-16
Payer: MEDICARE

## 2021-12-16 VITALS
RESPIRATION RATE: 16 BRPM | TEMPERATURE: 97.7 F | BODY MASS INDEX: 30.69 KG/M2 | HEART RATE: 93 BPM | OXYGEN SATURATION: 98 % | DIASTOLIC BLOOD PRESSURE: 82 MMHG | WEIGHT: 196 LBS | SYSTOLIC BLOOD PRESSURE: 110 MMHG

## 2021-12-16 DIAGNOSIS — J44.9 CHRONIC OBSTRUCTIVE PULMONARY DISEASE, UNSPECIFIED COPD TYPE (HCC): ICD-10-CM

## 2021-12-16 DIAGNOSIS — M10.079 IDIOPATHIC GOUT OF FOOT, UNSPECIFIED CHRONICITY, UNSPECIFIED LATERALITY: ICD-10-CM

## 2021-12-16 DIAGNOSIS — R51.9 NONINTRACTABLE HEADACHE, UNSPECIFIED CHRONICITY PATTERN, UNSPECIFIED HEADACHE TYPE: ICD-10-CM

## 2021-12-16 DIAGNOSIS — R53.83 FATIGUE, UNSPECIFIED TYPE: Primary | ICD-10-CM

## 2021-12-16 PROCEDURE — 99213 OFFICE O/P EST LOW 20 MIN: CPT | Performed by: INTERNAL MEDICINE

## 2021-12-16 RX ORDER — COLCHICINE 0.6 MG/1
TABLET ORAL
Qty: 15 TABLET | Refills: 1 | Status: SHIPPED | OUTPATIENT
Start: 2021-12-16 | End: 2022-01-18 | Stop reason: ALTCHOICE

## 2021-12-16 RX ORDER — ALBUTEROL SULFATE 2.5 MG/3ML
2.5 SOLUTION RESPIRATORY (INHALATION) EVERY 6 HOURS PRN
Qty: 75 EACH | Refills: 2 | Status: SHIPPED | OUTPATIENT
Start: 2021-12-16

## 2021-12-26 PROBLEM — R51.9 NONINTRACTABLE HEADACHE: Status: ACTIVE | Noted: 2021-12-26

## 2021-12-26 ASSESSMENT — ENCOUNTER SYMPTOMS
SHORTNESS OF BREATH: 0
CHEST TIGHTNESS: 0

## 2022-01-06 ENCOUNTER — VIRTUAL VISIT (OUTPATIENT)
Dept: PRIMARY CARE CLINIC | Age: 72
End: 2022-01-06
Payer: MEDICARE

## 2022-01-06 DIAGNOSIS — J06.9 UPPER RESPIRATORY TRACT INFECTION, UNSPECIFIED TYPE: Primary | ICD-10-CM

## 2022-01-06 PROCEDURE — 99213 OFFICE O/P EST LOW 20 MIN: CPT | Performed by: INTERNAL MEDICINE

## 2022-01-06 RX ORDER — AMOXICILLIN 875 MG/1
875 TABLET, COATED ORAL 2 TIMES DAILY
Qty: 20 TABLET | Refills: 0 | Status: SHIPPED | OUTPATIENT
Start: 2022-01-06 | End: 2022-01-16

## 2022-01-06 RX ORDER — FLUTICASONE PROPIONATE 50 MCG
2 SPRAY, SUSPENSION (ML) NASAL DAILY
Qty: 16 G | Refills: 0 | Status: SHIPPED | OUTPATIENT
Start: 2022-01-06 | End: 2022-01-28

## 2022-01-06 NOTE — PROGRESS NOTES
2022    TELEHEALTH EVALUATION -- Audio/Visual (During PJROS-28 public health emergency)    Chief Complaint   Patient presents with    Sinus Problem    Cough    Nasal Congestion       HPI:    Jabari Chaidez (:  1950) has requested an audio/video evaluation for the following concern(s):    Patient complains of little over a week h/o runny nose, sneezing, feeling mildly uncomfortable, and sleeping a little more. Patient states 2 days ago he was unable to clear his head and had thick and yellow phlegm. Patient complains of stuffy nose, runny nose, right ear pain, usual cough that is the same, and little sore throat off and on. Patient denies fever. Patient has been taking Tylenol gel caps. Patient states he has been staying home. Review of Systems   Constitutional: Negative for chills and fever. HENT: Positive for congestion, ear pain, rhinorrhea, sneezing and sore throat. Negative for postnasal drip, sinus pressure and sinus pain. Respiratory: Positive for cough. Negative for chest tightness, shortness of breath and wheezing. Neurological: Negative for headaches. Prior to Visit Medications    Medication Sig Taking?  Authorizing Provider   Multiple Vitamins-Minerals (CENTRUM SILVER 50+MEN) TABS Take by mouth Yes Historical Provider, MD   Coenzyme Q10 (COQ10 PO) Take by mouth Yes Historical Provider, MD   albuterol (PROVENTIL) (2.5 MG/3ML) 0.083% nebulizer solution Take 3 mLs by nebulization every 6 hours as needed for Wheezing or Shortness of Breath Yes Lindsay Lema MD   SYMBICORT 160-4.5 MCG/ACT AERO TAKE 2 PUFFS BY MOUTH TWICE A DAY Yes Lindsay Lema MD   atorvastatin (LIPITOR) 40 MG tablet TAKE 1 TABLET BY MOUTH EVERY DAY AT NIGHT Yes Joaquim Kern MD   tiZANidine (ZANAFLEX) 2 MG tablet Take 1 tablet by mouth 2 times daily as needed (pain) Yes Lindsay Lema MD   acetaminophen (TYLENOL) 500 MG tablet Take 2 tablets by mouth 3 times daily as needed for Pain Yes Carlo Bang MD   levothyroxine (SYNTHROID) 100 MCG tablet TAKE 1 TABLET BY MOUTH EVERY DAY Yes Carlo Bang MD   desoximetasone (TOPICORT) 0.25 % cream APPLY TO AFFECTED AREA(S) TWO TIMES A DAY FOR 2 WEEKS OR UNTIL IMPROVED Yes Gerald Betancourt MD   albuterol sulfate HFA (PROAIR HFA) 108 (90 Base) MCG/ACT inhaler Inhale 2 puffs into the lungs every 6 hours as needed for Wheezing or Shortness of Breath Yes Carlo Bang MD   Blood Pressure Monitoring (BLOOD PRESSURE MONITOR/M CUFF) MISC Use to monitor blood pressure Yes Carlo Bang MD   clobetasol (TEMOVATE) 0.05 % ointment Apply to affected areas of the skin twice daily until improved. Yes Gerald Betancourt MD   carvedilol (COREG) 6.25 MG tablet Take 1 tablet by mouth 2 times daily (with meals) Yes Carlo Bang MD   torsemide (DEMADEX) 20 MG tablet Take 2 tablets by mouth daily Yes Carlo Bang MD   losartan (COZAAR) 25 MG tablet Take 25 mg by mouth 2 times daily  Yes Carlo Bang MD   spironolactone (ALDACTONE) 25 MG tablet Take 0.5 tablets by mouth daily Yes Carlo Bang MD   potassium chloride (KLOR-CON M) 10 MEQ extended release tablet Take 1 tablet by mouth daily Yes ANTONIO Mcginnis CNP   colchicine (COLCRYS) 0.6 MG tablet Take 2 tablets once then repeat 1 tablet 1 hour later once  Patient not taking: Reported on 2022  Carlo Bang MD   allopurinol (ZYLOPRIM) 100 MG tablet Take 1 tablet by mouth daily  Patient not taking: Reported on 2022  Carlo Bang MD       Social History     Tobacco Use    Smoking status: Former Smoker     Packs/day: 0.25     Years: 24.00     Pack years: 6.00     Types: Cigarettes     Start date: 1968     Quit date: 1994     Years since quittin.0    Smokeless tobacco: Never Used   Vaping Use    Vaping Use: Never used   Substance Use Topics    Alcohol use:  Yes     Alcohol/week: 1.0 standard drink     Types: 1 Cans of beer per week     Comment: rare  Drug use: No        Allergies   Allergen Reactions    Latex Rash    Contrast [Gadolinium Derivatives] Shortness Of Breath and Anxiety    Soap Rash   ,   Past Medical History:   Diagnosis Date    Allergic rhinitis     Anxiety     Asthma     COPD (chronic obstructive pulmonary disease) (HCC)     Hyperlipidemia     Hypertension     Hypothyroidism     Soft tissue sarcoma ([facility] [address]        PHYSICAL EXAMINATION:  [ INSTRUCTIONS:  \"[x]\" Indicates a positive item  \"[]\" Indicates a negative item  -- DELETE ALL ITEMS NOT EXAMINED]  Vital Signs: (As obtained by patient/caregiver or practitioner observation)    Blood pressure-  Heart rate-    Respiratory rate-    Temperature-  Pulse oximetry-   Patient-Reported Vitals 1/6/2022   Patient-Reported Weight 185lbs   Patient-Reported Height 68        Constitutional: [x] Appears well-developed and well-nourished [x] No apparent distress      [] Abnormal-   Mental status  [x] Alert and awake  [x] Oriented to person/place/time [x]Able to follow commands      Eyes:  EOM    [x]  Normal  [] Abnormal-  Sclera  [x]  Normal  [] Abnormal -         Discharge [x]  None visible  [] Abnormal -    HENT:   [x] Normocephalic, atraumatic.   [] Abnormal   [] Mouth/Throat: Mucous membranes are moist.     External Ears [x] Normal  [] Abnormal-     Neck: [x] No visualized mass     Pulmonary/Chest: [x] Respiratory effort normal.  [x] No visualized signs of difficulty breathing or respiratory distress        [] Abnormal-      Musculoskeletal:   [] Normal gait with no signs of ataxia         [x] Normal range of motion of neck        [] Abnormal-       Neurological:        [x] No Facial Asymmetry (Cranial nerve 7 motor function) (limited exam to video visit)          [x] No gaze palsy        [] Abnormal-         Skin:        [x] No significant exanthematous lesions or discoloration noted on facial skin         [] Abnormal-            Psychiatric:       [x] Normal Affect [] No Hallucinations        [] Abnormal-     Other pertinent observable physical exam findings-     Lab Review   Abstract on 11/15/2021   Component Date Value    Sodium 11/12/2021 144     Chloride 11/12/2021 105     Potassium 11/12/2021 4.8     BUN 11/12/2021 29     CREATININE 11/12/2021 1.43     Glucose 11/12/2021 95     Calcium 11/12/2021 9.6     CO2 11/12/2021 30.3     Gfr Calculated 11/12/2021 49     Anion Gap 11/12/2021 9    Office Visit on 11/10/2021   Component Date Value    Dermatology Pathology Re* 11/10/2021 SEE COMMENTS    Office Visit on 11/04/2021   Component Date Value    BASIC METABOLIC PANEL 47/50/8786 NA     Sodium 11/12/2021 144     Potassium 11/12/2021 4.8     Chloride 11/12/2021 105     CO2 11/12/2021 30.3     Anion Gap 11/12/2021 9     POC Glucose 11/12/2021 95     BUN 11/12/2021 29*    CREATININE 11/12/2021 1.43*    Calcium 11/12/2021 9.6     Age Time 11/12/2021 71     GFR Non- 11/12/2021 49     GFR  11/12/2021 59     BNP 11/12/2021 149    Orders Only on 11/02/2021   Component Date Value    Sodium 11/02/2021 141     Potassium 11/02/2021 5.0     Chloride 11/02/2021 102     CO2 11/02/2021 24     Anion Gap 11/02/2021 15     Glucose 11/02/2021 73     BUN 11/02/2021 29*    CREATININE 11/02/2021 1.3     GFR Non- 11/02/2021 54*    GFR  11/02/2021 >60     Calcium 11/02/2021 9.8     TSH 11/02/2021 1.54     WBC 11/02/2021 9.9     RBC 11/02/2021 5.01     Hemoglobin 11/02/2021 15.5     Hematocrit 11/02/2021 46.9     MCV 11/02/2021 93.6     MCH 11/02/2021 31.0     MCHC 11/02/2021 33.1     RDW 11/02/2021 14.3     Platelets 45/58/5670 211     MPV 11/02/2021 9.6     PLATELET SLIDE REVIEW 11/02/2021 Adequate     SLIDE REVIEW 11/02/2021 see below     Neutrophils % 11/02/2021 76.0     Lymphocytes % 11/02/2021 11.0     Monocytes % 11/02/2021 9.0     Eosinophils % 11/02/2021 3.0     Basophils % 11/02/2021 0.0     Neutrophils Absolute 11/02/2021 7.6     Lymphocytes Absolute 11/02/2021 1.1     Monocytes Absolute 11/02/2021 0.9     Eosinophils Absolute 11/02/2021 0.3     Basophils Absolute 11/02/2021 0.0     Bands Relative 11/02/2021 1    Office Visit on 11/02/2021   Component Date Value    Drugs Expected 11/02/2021 see paper     Pain Management Drug Pan* 11/02/2021 See Note     Creatinine, Ur 11/02/2021 84.5     Codeine 11/02/2021 Not Detected     Morphine 11/02/2021 Not Detected     6-Acetylmorphine 11/02/2021 Not Detected     Oxycodone 11/02/2021 Not Detected     Noroxycodone 11/02/2021 Not Detected     Oxymorphone 11/02/2021 Not Detected     NOROXYMORPHONE, URINE 11/02/2021 Not Detected     Hydrocodone 11/02/2021 Not Detected     NORHYDROCODONE, URINE 11/02/2021 Not Detected     Hydromorphone 11/02/2021 Not Detected     Naloxone 11/02/2021 Not Detected     Buprenorphine 11/02/2021 Not Detected     Norbuprenorphine 11/02/2021 Not Detected     Fentanyl 11/02/2021 Not Detected     Norfentanyl 11/02/2021 Not Detected     Meperidine 11/02/2021 Not Detected     Tapentadol, Urine 11/02/2021 Not Detected     Tapentadol-O-Sulfate, Ur* 11/02/2021 Not Detected     Methadone 11/02/2021 Not Detected     Tramadol 11/02/2021 Not Detected     Amphetamine 11/02/2021 Not Detected     Methamphetamine 11/02/2021 Not Detected     MDMA, Urine 11/02/2021 Not Detected     MDA 11/02/2021 Not Detected     MDEA 11/02/2021 Not Detected     Methylphenidate 11/02/2021 Not Detected     Phentermine 11/02/2021 Not Detected     Benzoylecgonine 11/02/2021 Not Detected     Alprazolam 11/02/2021 Present     Alpha-OH-alprazolam 11/02/2021 Present     Clonazepam 11/02/2021 Not Detected     7-aminoclonazepam 11/02/2021 Not Detected     Diazepam 11/02/2021 Not Detected     NORDIAZEPAM 11/02/2021 Not Detected     OXAZEPAM 11/02/2021 Not Detected     TEMAZEPAM 11/02/2021 Not Detected     Lorazepam 11/02/2021 Not Detected     Midazolam 11/02/2021 Not Detected     Zolpidem 11/02/2021 Not Detected     Gabapentin 11/02/2021 Not Detected     Pregabalin 11/02/2021 Not Detected     Alpha-OH-Midazolam, Urine 11/02/2021 Not Detected     Barbiturates 11/02/2021 Not Detected     Ethyl Glucuronide 11/02/2021 Present     Marijuana Metabolite 11/02/2021 Not Detected     PCP 11/02/2021 Not Detected     CARISOPRODOL 11/02/2021 Not Detected     Pain Management Drug Pan* 11/02/2021 See Below     [facility] Pain Mgt Drug Panel,* 11/02/2021 See Note        ASSESSMENT/PLAN:  1. Upper respiratory tract infection, unspecified type  - amoxicillin (AMOXIL) 875 MG tablet; Take 1 tablet by mouth 2 times daily for 10 days  Dispense: 20 tablet; Refill: 0  - fluticasone (FLONASE) 50 MCG/ACT nasal spray; 2 sprays by Nasal route daily  Dispense: 16 g; Refill: 0  -Tylenol 650mg 3 times daily as needed  -Patient to have a Covid test done    Return if symptoms worsen or fail to improve. Awais Sunshine, was evaluated through a synchronous (real-time) audio-video encounter. The patient (or guardian if applicable) is aware that this is a billable service. Verbal consent to proceed has been obtained within the past 12 months. The visit was conducted pursuant to the emergency declaration under the [address] [place] [address] and the [place] and [employer] General Act. Patient identification was verified, and a caregiver was present when appropriate. The patient was located in a state where the provider was credentialed to provide care. Total time spent on this encounter: Not billed by time    --Lester Bunn MD on 1/15/2022 at 11:39 PM    An electronic signature was used to authenticate this note.

## 2022-01-07 ENCOUNTER — TELEPHONE (OUTPATIENT)
Dept: PRIMARY CARE CLINIC | Age: 72
End: 2022-01-07

## 2022-01-07 NOTE — TELEPHONE ENCOUNTER
----- Message from Elisa Nagy sent at 1/7/2022  3:08 PM EST -----  Subject: Message to Provider    QUESTIONS  Information for Provider? Pt is experiencing sore throat , fatigue ,   headache fever, chills. Pt would like an order for a Covid test. Please   advise Pt  ---------------------------------------------------------------------------  --------------  CALL BACK INFO  What is the best way for the office to contact you? OK to leave message on   voicemail  Preferred Call Back Phone Number? [phone]  ---------------------------------------------------------------------------  --------------  SCRIPT ANSWERS  Relationship to Patient?  Self

## 2022-01-15 ASSESSMENT — ENCOUNTER SYMPTOMS
SINUS PRESSURE: 0
WHEEZING: 0
SINUS PAIN: 0
CHEST TIGHTNESS: 0
SHORTNESS OF BREATH: 0
SORE THROAT: 1
RHINORRHEA: 1
COUGH: 1

## 2022-01-16 NOTE — PATIENT INSTRUCTIONS
1. Upper respiratory tract infection, unspecified type  - amoxicillin (AMOXIL) 875 MG tablet; Take 1 tablet by mouth 2 times daily for 10 days  Dispense: 20 tablet;  Refill: 0  - fluticasone (FLONASE) 50 MCG/ACT nasal spray; 2 sprays by Nasal route daily  Dispense: 16 g; Refill: 0  -Tylenol 650mg 3 times daily as needed  -Patient to have a Covid test done

## 2022-01-17 DIAGNOSIS — J06.9 UPPER RESPIRATORY TRACT INFECTION, UNSPECIFIED TYPE: Primary | ICD-10-CM

## 2022-01-17 NOTE — TELEPHONE ENCOUNTER
SUBJECTIVE:   Chuck Wilson is a 12 month old male, here for a routine health maintenance visit,   accompanied by his mother, father and .    Patient was roomed by: Tierra Spann CMA    Do you have any forms to be completed?  no    SOCIAL HISTORY  Child lives with: mother  Who takes care of your infant: mother  Language(s) spoken at home: English, Hmong  Recent family changes/social stressors: none noted    SAFETY/HEALTH RISK  Is your child around anyone who smokes:  No  TB exposure:  No  Is your car seat less than 6 years old, in the back seat, rear-facing, 5-point restraint:  Yes  Home Safety Survey:  Stairs gated:  yes  Wood stove/Fireplace screened:  Yes  Poisons/cleaning supplies out of reach:  Yes  Swimming pool:  No    Guns/firearms in the home: No    DENTAL  Dental health HIGH risk factors: none  Water source:  city water     DAILY ACTIVITIES  NUTRITION: eats a variety of foods, Similac Advance formula and bottle    SLEEP  Arrangements:    crib  Problems    no    ELIMINATION  Stools:    normal soft stools  Urination:    normal wet diapers    HEARING/VISION: no concerns, hearing and vision subjectively normal.    QUESTIONS/CONCERNS: None    ==================    DEVELOPMENT  Screening tool used, reviewed with parent/guardian:   ASQ 12 M Communication Gross Motor Fine Motor Problem Solving Personal-social   Score 45 55 40 45 40   Cutoff 15.64 21.49 34.50 27.32 21.73   Result Passed Passed MONITOR Passed Passed   Parents have not tried several tests under fine motor.    PROBLEM LIST  There is no problem list on file for this patient.    MEDICATIONS  Current Outpatient Prescriptions   Medication Sig Dispense Refill     acetaminophen (TYLENOL) 32 mg/mL solution Take 15 mg/kg by mouth every 4 hours as needed for fever or mild pain       ibuprofen (CHILDRENS MOTRIN) 100 MG/5ML suspension Take 3 mLs (60 mg) by mouth every 6 hours as needed for fever or moderate pain 60 mL 1      ALLERGY  No Known  Called and spoke with patient he states, the mucous is still yellow/dark and thick from his nose and still fatigued a bit. He took the amoxicillin last dose yesterday afternoon. He said his symptoms are better but he does not feel they are resolved. Patient did have covid test,came back negative. He wants to know he if needs another antibiotic to clear this up. "Allergies    IMMUNIZATIONS  Immunization History   Administered Date(s) Administered     DTAP-IPV/HIB (PENTACEL) 2017, 2017, 04/27/2018     Hep B, Peds or Adolescent 2017, 2017, 04/27/2018     Pneumo Conj 13-V (2010&after) 2017, 2017, 04/27/2018     Rotavirus, monovalent, 2-dose 2017, 2017       HEALTH HISTORY SINCE LAST VISIT  No surgery, major illness or injury since last physical exam    ROS  GENERAL: See health history, nutrition and daily activities   SKIN: No significant rash or lesions.  HEENT: Hearing/vision: see above.  No eye, nasal, ear symptoms.  RESP: No cough or other concens  CV:  No concerns  GI: See nutrition and elimination.  No concerns.  : See elimination. No concerns.  NEURO: See development    OBJECTIVE:   EXAM  Pulse 133  Temp 98.7  F (37.1  C) (Temporal)  Ht 2' 6.59\" (0.777 m)  Wt 22 lb 3 oz (10.1 kg)  HC 18.27\" (46.4 cm)  SpO2 100%  BMI 16.67 kg/m2  Wt Readings from Last 3 Encounters:   06/29/18 22 lb 3 oz (10.1 kg) (65 %)*   04/27/18 20 lb 10 oz (9.355 kg) (58 %)*   03/05/18 19 lb (8.618 kg) (47 %)*     * Growth percentiles are based on WHO (Boys, 0-2 years) data.     Ht Readings from Last 2 Encounters:   06/29/18 2' 6.59\" (0.777 m) (78 %)*   04/27/18 2' 5.21\" (0.742 m) (66 %)*     * Growth percentiles are based on WHO (Boys, 0-2 years) data.     46 %ile based on WHO (Boys, 0-2 years) BMI-for-age data using vitals from 6/29/2018.    GENERAL: Active, alert, in no acute distress.  SKIN: Clear. No significant rash, abnormal pigmentation or lesions  HEAD: Normocephalic. Normal fontanels and sutures.  EYES: Conjunctivae and cornea normal. Red reflexes present bilaterally. Symmetric light reflex and no eye movement on cover/uncover test  EARS: Normal canals. Tympanic membranes are normal; gray and translucent.  NOSE: Normal without discharge.  MOUTH/THROAT: Clear. No oral lesions.  NECK: Supple, no masses.  LYMPH NODES: No adenopathy  LUNGS: " Clear. No rales, rhonchi, wheezing or retractions  HEART: Regular rhythm. Normal S1/S2. No murmurs. Normal femoral pulses.  ABDOMEN: Soft, non-tender, not distended, no masses or hepatosplenomegaly. Normal umbilicus and bowel sounds.   GENITALIA: Normal male external genitalia. Yared stage I,  Testes descended bilaterally, no hernia or hydrocele.    EXTREMITIES: Hips normal with full range of motion. Symmetric extremities, no deformities  NEUROLOGIC: Normal tone throughout. Normal reflexes for age    ASSESSMENT/PLAN:   1. Encounter for routine child health examination w/o abnormal findings  Normal growth and development for age based on percentiles and ASQ. Normal exam today as well. Anticipatory guidance discussed as below.  Shots: 1 year vaccines today + labs.  Follow up in 3 months for next well child visit at 15 mos old.  All questions addressed with parents.    - Hemoglobin  - Lead Capillary  - Screening Questionnaire for Immunizations  - MMR VIRUS IMMUNIZATION, SUBCUT [19929]  - CHICKEN POX VACCINE,LIVE,SUBCUT [13492]  - HEPA VACCINE PED/ADOL-2 DOSE(aka HEP A) [68552]    Anticipatory Guidance  The following topics were discussed:  SOCIAL/ FAMILY:    Stranger/ separation anxiety    Distraction as discipline    Reading to child    Given a book from Reach Out & Read    Bedtime /nap routine  NUTRITION:    Encourage self-feeding    Whole milk introduction    Iron, calcium sources    Avoid foods conflicts    Choking prevention- no popcorn, nuts, gum, raisins, etc    Age-related decrease in appetite  HEALTH/ SAFETY:    Lead risk    Sunscreen/ insect repellent    Child proof home    Poison control/ ipecac not recommended    Choking    Carseat    Never leave unattended    Preventive Care Plan  Immunizations     See orders in [facility].  I reviewed the signs and symptoms of adverse effects and when to seek medical care if they should arise.  Referrals/Ongoing Specialty care: No   See other orders in [facility]  Dental  visit recommended: Yes  Dental varnish declined by parent    FOLLOW-UP:     15 month Preventive Care visit    Arely Skinner MD  [facility]

## 2022-01-17 NOTE — TELEPHONE ENCOUNTER
----- Message from [facility] sent at 1/17/2022 11:22 AM EST -----  Subject: Medication Problem    QUESTIONS  Name of Medication? amoxicillin (AMOXIL) 875 MG tablet  Patient-reported dosage and instructions? 875 MG 2 per day  What question or problem do you have with the medication? Patient said he   was seen on 1-6-22 with  for an upper respiratory infection. He   said his symptoms got better but still has the infection. Patient said one   round of Amoxicillin normally does not work for him and he wants to know   if he should   Preferred Pharmacy? [id number]- [place], [place] - [address] -   P [phone] - F [phone]  Pharmacy phone number (if available)? [phone]  Additional Information for Provider? continue with the amoxicillin or get   another antibiotic. Patient said he did not want to schedule another   appointment yet unless Dr. Delphine Matta wants him to schedule one. Please call   patient back in regarding his concerns at [phone].  ---------------------------------------------------------------------------  --------------  CALL BACK INFO  What is the best way for the office to contact you? OK to leave message on   [employer], OK to respond with electronic message via [employer] portal (only   for patients who have registered [employer] account)  Preferred Call Back Phone Number? [phone]  ---------------------------------------------------------------------------  --------------  SCRIPT ANSWERS  Relationship to Patient?  Self

## 2022-01-18 RX ORDER — AZITHROMYCIN 250 MG/1
TABLET, FILM COATED ORAL
Qty: 1 PACKET | Refills: 0 | Status: SHIPPED | OUTPATIENT
Start: 2022-01-18 | End: 2022-01-28

## 2022-01-18 NOTE — TELEPHONE ENCOUNTER
Call patient I recommend another antibiotic Zithromax Z-Franklin. I sent a prescription to his pharmacy. He should not take colchicine during the time he is taking this antibiotic because it will cause an interaction.

## 2022-01-20 DIAGNOSIS — F41.1 GENERALIZED ANXIETY DISORDER: ICD-10-CM

## 2022-01-20 RX ORDER — ALPRAZOLAM 1 MG/1
TABLET ORAL
Qty: 45 TABLET | Refills: 0 | Status: SHIPPED | OUTPATIENT
Start: 2022-01-20 | End: 2022-02-17 | Stop reason: SDUPTHER

## 2022-01-20 NOTE — TELEPHONE ENCOUNTER
Medication:   Requested Prescriptions     Pending Prescriptions Disp Refills    ALPRAZolam (XANAX) 1 MG tablet [Pharmacy Med Name: ALPRAZOLAM 1 MG TABLET] 45 tablet 1     Sig: TAKE 1/2 TABLET EVERY DAY AND THEN TAKE 1 TABLET AT BEDTIME       Last appt: 1/6/2022   Next appt: 2/3/2022    Last OARRS:   RX Monitoring 12/16/2021   Attestation -   Periodic Controlled Substance Monitoring No signs of potential drug abuse or diversion identified.

## 2022-02-09 ENCOUNTER — TELEPHONE (OUTPATIENT)
Dept: PRIMARY CARE CLINIC | Age: 72
End: 2022-02-09

## 2022-02-09 NOTE — TELEPHONE ENCOUNTER
----- Message from [facility] sent at 2/9/2022  1:32 PM EST -----  Subject: Message to Provider    QUESTIONS  Information for Provider? Pt says he has a sinus infection and would like   to know if his provider will be able to prescribe another antibiotic   because the symptoms hasn't wore off yet and pt would like to know what he   should do next. Pt would like a call back with further information.  ---------------------------------------------------------------------------  --------------  CALL BACK INFO  What is the best way for the office to contact you? OK to leave message on   voicemail  Preferred Call Back Phone Number? [phone]  ---------------------------------------------------------------------------  --------------  SCRIPT ANSWERS  Relationship to Patient?  Self

## 2022-02-17 ENCOUNTER — OFFICE VISIT (OUTPATIENT)
Dept: PRIMARY CARE CLINIC | Age: 72
End: 2022-02-17
Payer: MEDICARE

## 2022-02-17 VITALS
WEIGHT: 196 LBS | DIASTOLIC BLOOD PRESSURE: 74 MMHG | OXYGEN SATURATION: 98 % | BODY MASS INDEX: 30.69 KG/M2 | HEART RATE: 86 BPM | SYSTOLIC BLOOD PRESSURE: 116 MMHG | TEMPERATURE: 97.6 F | RESPIRATION RATE: 16 BRPM

## 2022-02-17 DIAGNOSIS — R09.81 NASAL CONGESTION: ICD-10-CM

## 2022-02-17 DIAGNOSIS — F41.1 GENERALIZED ANXIETY DISORDER: Primary | ICD-10-CM

## 2022-02-17 PROCEDURE — 99213 OFFICE O/P EST LOW 20 MIN: CPT | Performed by: INTERNAL MEDICINE

## 2022-02-17 RX ORDER — ALPRAZOLAM 1 MG/1
TABLET ORAL
Qty: 45 TABLET | Refills: 2 | Status: SHIPPED | OUTPATIENT
Start: 2022-02-17 | End: 2022-03-19

## 2022-02-17 RX ORDER — MELOXICAM 15 MG/1
15 TABLET ORAL DAILY
COMMUNITY
End: 2022-05-17

## 2022-02-17 NOTE — PROGRESS NOTES
Madelin Siu   Date ofBirth:  1950    Date of Visit:  2/17/2022    Chief Complaint   Patient presents with    Anxiety    Nasal Congestion       HPI  Patient has anxiety. Patient takes Xanax 1/2 tablet daily and 1 tablet nightly. Patient states his anxiety is a little heightened. Patient states he personally knows a lot of people dying of Covid. 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 Patient worries a lot. Patient states he is nervous around people and afraid of being hurt. Patient states sometimes his anxiety has physical manifestations such as heart races. Patient states he has been on medication for anxiety since College. Patient states the antidepressants do not work for his anxiety. Patient states they numb his prefrontal area and make him lethargic and not caring about anything. Patient states he has to care about things because a lot of people depend on his judgement. Patient states aging and being alone increases his anxiety. Patient states he hasn't had any panic attacks recently. Patient does relaxation techniques. Patient declines counseling. Patient states he still has periodic stuffy nose. Patient states Flonase nasal spray helps. Patient denies cough. Review of Systems   Constitutional: Negative for activity change, appetite change, chills, fatigue, fever and unexpected weight change. HENT: Positive for congestion. Negative for ear pain, postnasal drip, rhinorrhea, sinus pressure, sinus pain, sneezing and sore throat. Respiratory: Negative for cough, chest tightness, shortness of breath and wheezing. Cardiovascular: Negative for chest pain and palpitations. Gastrointestinal: Negative for abdominal pain, nausea and vomiting. Neurological: Negative for headaches. Psychiatric/Behavioral: Negative for decreased concentration, dysphoric mood and sleep disturbance. The patient is nervous/anxious.         Allergies   Allergen Reactions    Latex Rash    Contrast [Gadolinium Derivatives] Shortness Of Breath and Anxiety    Soap Rash     Outpatient Medications Marked as Taking for the 2/17/22 encounter (Office Visit) with Mich Shepard MD   Medication Sig Dispense Refill    meloxicam (MOBIC) 15 MG tablet Take 15 mg by mouth daily      fluticasone (FLONASE) 50 MCG/ACT nasal spray SPRAY 2 SPRAYS BY NASAL ROUTE DAILY 16 g 3    ALPRAZolam (XANAX) 1 MG tablet TAKE 1/2 TABLET EVERY DAY AND THEN TAKE 1 TABLET AT BEDTIME 45 tablet 0    Multiple Vitamins-Minerals (CENTRUM SILVER 50+MEN) TABS Take by mouth      Coenzyme Q10 (COQ10 PO) Take by mouth      albuterol (PROVENTIL) (2.5 MG/3ML) 0.083% nebulizer solution Take 3 mLs by nebulization every 6 hours as needed for Wheezing or Shortness of Breath 75 each 2    SYMBICORT 160-4.5 MCG/ACT AERO TAKE 2 PUFFS BY MOUTH TWICE A DAY 10.2 each 3    atorvastatin (LIPITOR) 40 MG tablet TAKE 1 TABLET BY MOUTH EVERY DAY AT NIGHT 90 tablet 1    tiZANidine (ZANAFLEX) 2 MG tablet Take 1 tablet by mouth 2 times daily as needed (pain) 60 tablet 3    acetaminophen (TYLENOL) 500 MG tablet Take 2 tablets by mouth 3 times daily as needed for Pain      levothyroxine (SYNTHROID) 100 MCG tablet TAKE 1 TABLET BY MOUTH EVERY DAY 90 tablet 1    desoximetasone (TOPICORT) 0.25 % cream APPLY TO AFFECTED AREA(S) TWO TIMES A DAY FOR 2 WEEKS OR UNTIL IMPROVED 60 g 2    albuterol sulfate HFA (PROAIR HFA) 108 (90 Base) MCG/ACT inhaler Inhale 2 puffs into the lungs every 6 hours as needed for Wheezing or Shortness of Breath 1 Inhaler 5    allopurinol (ZYLOPRIM) 100 MG tablet Take 1 tablet by mouth daily 30 tablet 3    Blood Pressure Monitoring (BLOOD PRESSURE MONITOR/M CUFF) MISC Use to monitor blood pressure 1 each 0    clobetasol (TEMOVATE) 0.05 % ointment Apply to affected areas of the skin twice daily until improved.  60 g 2    carvedilol (COREG) 6.25 MG tablet Take 1 tablet by mouth 2 times daily (with meals)      torsemide (DEMADEX) 20 MG tablet Take 2 tablets by mouth daily      losartan (COZAAR) 25 MG tablet Take 25 mg by mouth 2 times daily       spironolactone (ALDACTONE) 25 MG tablet Take 0.5 tablets by mouth daily      potassium chloride (KLOR-CON M) 10 MEQ extended release tablet Take 1 tablet by mouth daily 30 tablet 3         Vitals:    02/17/22 0938   BP: 116/74   Pulse: 86   Resp: 16   Temp: 97.6 °F (36.4 °C)   SpO2: 98%   Weight: 196 lb (88.9 kg)     Body mass index is 30.69 kg/m². Physical Exam  Nursing note reviewed. Constitutional:       General: He is not in acute distress. Appearance: Normal appearance. He is well-developed. HENT:      Right Ear: Tympanic membrane and ear canal normal.      Left Ear: Tympanic membrane and ear canal normal.      Nose:      Right Sinus: No maxillary sinus tenderness. Left Sinus: No maxillary sinus tenderness. Eyes:      General: Lids are normal.      Extraocular Movements: Extraocular movements intact. Conjunctiva/sclera: Conjunctivae normal.      Pupils: Pupils are equal, round, and reactive to light. Neck:      Thyroid: No thyromegaly. Cardiovascular:      Rate and Rhythm: Normal rate and regular rhythm. Heart sounds: Normal heart sounds, S1 normal and S2 normal. No murmur heard. Pulmonary:      Effort: Pulmonary effort is normal.      Breath sounds: Normal breath sounds. No wheezing, rhonchi or rales. Neurological:      Mental Status: He is alert. No results found for this visit on 02/17/22.   Lab Review   Abstract on 11/15/2021   Component Date Value    Sodium 11/12/2021 144     Chloride 11/12/2021 105     Potassium 11/12/2021 4.8     BUN 11/12/2021 29     CREATININE 11/12/2021 1.43     Glucose 11/12/2021 95     Calcium 11/12/2021 9.6     CO2 11/12/2021 30.3     Gfr Calculated 11/12/2021 49     Anion Gap 11/12/2021 9    Office Visit on 11/10/2021   Component Date Value    Dermatology Pathology Re* 11/10/2021 SEE COMMENTS    Office Visit on 11/04/2021   Component Date Value    BASIC METABOLIC PANEL 77/52/2944 NA     Sodium 11/12/2021 144     Potassium 11/12/2021 4.8     Chloride 11/12/2021 105     CO2 11/12/2021 30.3     Anion Gap 11/12/2021 9     POC Glucose 11/12/2021 95     BUN 11/12/2021 29*    CREATININE 11/12/2021 1.43*    Calcium 11/12/2021 9.6     Age Time 11/12/2021 71     GFR Non- 11/12/2021 49     GFR  11/12/2021 59     BNP 11/12/2021 149    Orders Only on 11/02/2021   Component Date Value    Sodium 11/02/2021 141     Potassium 11/02/2021 5.0     Chloride 11/02/2021 102     CO2 11/02/2021 24     Anion Gap 11/02/2021 15     Glucose 11/02/2021 73     BUN 11/02/2021 29*    CREATININE 11/02/2021 1.3     GFR Non- 11/02/2021 54*    GFR  11/02/2021 >60     Calcium 11/02/2021 9.8     TSH 11/02/2021 1.54     WBC 11/02/2021 9.9     RBC 11/02/2021 5.01     Hemoglobin 11/02/2021 15.5     Hematocrit 11/02/2021 46.9     MCV 11/02/2021 93.6     MCH 11/02/2021 31.0     MCHC 11/02/2021 33.1     RDW 11/02/2021 14.3     Platelets 59/83/0089 211     MPV 11/02/2021 9.6     PLATELET SLIDE REVIEW 11/02/2021 Adequate     SLIDE REVIEW 11/02/2021 see below     Neutrophils % 11/02/2021 76.0     Lymphocytes % 11/02/2021 11.0     Monocytes % 11/02/2021 9.0     Eosinophils % 11/02/2021 3.0     Basophils % 11/02/2021 0.0     Neutrophils Absolute 11/02/2021 7.6     Lymphocytes Absolute 11/02/2021 1.1     Monocytes Absolute 11/02/2021 0.9     Eosinophils Absolute 11/02/2021 0.3     Basophils Absolute 11/02/2021 0.0     Bands Relative 11/02/2021 1    Office Visit on 11/02/2021   Component Date Value    Drugs Expected 11/02/2021 see paper     Pain Management Drug Pan* 11/02/2021 See Note     Creatinine, Ur 11/02/2021 84.5     Codeine 11/02/2021 Not Detected     Morphine 11/02/2021 Not Detected     6-Acetylmorphine 11/02/2021 Not Detected     Oxycodone 11/02/2021 Not Detected     Noroxycodone 11/02/2021 Not Detected     Oxymorphone 11/02/2021 Not Detected     NOROXYMORPHONE, URINE 11/02/2021 Not Detected     Hydrocodone 11/02/2021 Not Detected     NORHYDROCODONE, URINE 11/02/2021 Not Detected     Hydromorphone 11/02/2021 Not Detected     Naloxone 11/02/2021 Not Detected     Buprenorphine 11/02/2021 Not Detected     Norbuprenorphine 11/02/2021 Not Detected     Fentanyl 11/02/2021 Not Detected     Norfentanyl 11/02/2021 Not Detected     Meperidine 11/02/2021 Not Detected     Tapentadol, Urine 11/02/2021 Not Detected     Tapentadol-O-Sulfate, Ur* 11/02/2021 Not Detected     Methadone 11/02/2021 Not Detected     Tramadol 11/02/2021 Not Detected     Amphetamine 11/02/2021 Not Detected     Methamphetamine 11/02/2021 Not Detected     MDMA, Urine 11/02/2021 Not Detected     MDA 11/02/2021 Not Detected     MDEA 11/02/2021 Not Detected     Methylphenidate 11/02/2021 Not Detected     Phentermine 11/02/2021 Not Detected     Benzoylecgonine 11/02/2021 Not Detected     Alprazolam 11/02/2021 Present     Alpha-OH-alprazolam 11/02/2021 Present     Clonazepam 11/02/2021 Not Detected     7-aminoclonazepam 11/02/2021 Not Detected     Diazepam 11/02/2021 Not Detected     NORDIAZEPAM 11/02/2021 Not Detected     OXAZEPAM 11/02/2021 Not Detected     TEMAZEPAM 11/02/2021 Not Detected     Lorazepam 11/02/2021 Not Detected     Midazolam 11/02/2021 Not Detected     Zolpidem 11/02/2021 Not Detected     Gabapentin 11/02/2021 Not Detected     Pregabalin 11/02/2021 Not Detected     Alpha-OH-Midazolam, Urine 11/02/2021 Not Detected     Barbiturates 11/02/2021 Not Detected     Ethyl Glucuronide 11/02/2021 Present     Marijuana Metabolite 11/02/2021 Not Detected     PCP 11/02/2021 Not Detected     CARISOPRODOL 11/02/2021 Not Detected     Pain Management Drug Pan* 11/02/2021 See Below     [facility] Pain Mgt Drug Panel,* 11/02/2021 See Note      Lab Results   Component Value Date    CHOL 161 08/16/2021    TRIG 106 08/16/2021    HDL 62 08/16/2021    LDLCALC 81 06/03/2015    LDLDIRECT 78 08/16/2021     Lab Results   Component Value Date    LABA1C 6.0 08/16/2021     Lab Results   Component Value Date    TSH 1.54 11/02/2021           Assessment/Plan     1. Generalized anxiety disorder  - same and stable overall   - continue ALPRAZolam (XANAX) 1 MG tablet; TAKE 1/2 TABLET EVERY DAY AND THEN TAKE 1 TABLET AT BEDTIME  Dispense: 45 tablet; Refill: 2  -patient declines counseling  -relaxation techniques    2. Nasal congestion  -better  -continue Flonase nasal spray 2 sprays each nostril once daily       Discussed medications with patient, who voiced understanding of their use and indications. All questions answered. Return in about 3 months (around 5/17/2022) for  anxiety, hypertension, COPD, hypothyroidism, and hyperlipidemia.

## 2022-02-17 NOTE — PATIENT INSTRUCTIONS
1. Generalized anxiety disorder  - same and stable overall   - continue ALPRAZolam (XANAX) 1 MG tablet; TAKE 1/2 TABLET EVERY DAY AND THEN TAKE 1 TABLET AT BEDTIME  Dispense: 45 tablet; Refill: 2  -patient declines counseling  -relaxation techniques    2.  Nasal congestion  -better  -continue Flonase nasal spray 2 sprays each nostril once daily

## 2022-02-26 ASSESSMENT — ENCOUNTER SYMPTOMS
SINUS PAIN: 0
VOMITING: 0
SORE THROAT: 0
NAUSEA: 0
ABDOMINAL PAIN: 0
RHINORRHEA: 0
COUGH: 0
SHORTNESS OF BREATH: 0
CHEST TIGHTNESS: 0
WHEEZING: 0
SINUS PRESSURE: 0

## 2022-03-09 ENCOUNTER — TELEPHONE (OUTPATIENT)
Dept: PRIMARY CARE CLINIC | Age: 72
End: 2022-03-09

## 2022-03-09 DIAGNOSIS — J44.9 CHRONIC OBSTRUCTIVE PULMONARY DISEASE, UNSPECIFIED COPD TYPE (HCC): ICD-10-CM

## 2022-03-09 NOTE — TELEPHONE ENCOUNTER
[facility] sent a fax over requesting a 90 day script for patients Symbicort be sent over due to insurance.

## 2022-03-10 RX ORDER — BUDESONIDE AND FORMOTEROL FUMARATE DIHYDRATE 160; 4.5 UG/1; UG/1
AEROSOL RESPIRATORY (INHALATION)
Qty: 3 EACH | Refills: 3 | Status: SHIPPED | OUTPATIENT
Start: 2022-03-10

## 2022-03-24 DIAGNOSIS — M10.079 IDIOPATHIC GOUT OF FOOT, UNSPECIFIED CHRONICITY, UNSPECIFIED LATERALITY: ICD-10-CM

## 2022-03-24 NOTE — TELEPHONE ENCOUNTER
Medication:   Requested Prescriptions     Pending Prescriptions Disp Refills    colchicine (COLCRYS) 0.6 MG tablet [Pharmacy Med Name: COLCHICINE 0.6 MG TABLET] 15 tablet 1     Sig: TAKE 2 TABLETS ONCE THEN REPEAT 1 TABLET 1 HOUR LATER ONCE     Last Filled: 12.16.21    Last appt:12.16.21   Next appt: 5/17/2022    Last OARRS:   RX Monitoring 2/17/2022   Attestation -   Periodic Controlled Substance Monitoring No signs of potential drug abuse or diversion identified.

## 2022-03-25 RX ORDER — COLCHICINE 0.6 MG/1
TABLET ORAL
Qty: 15 TABLET | Refills: 1 | Status: SHIPPED | OUTPATIENT
Start: 2022-03-25 | End: 2022-08-04

## 2022-03-29 ENCOUNTER — TELEPHONE (OUTPATIENT)
Dept: PRIMARY CARE CLINIC | Age: 72
End: 2022-03-29

## 2022-03-29 NOTE — TELEPHONE ENCOUNTER
Received a fax from [facility] stating that Alprazolam is not covered by patient insurance. Suggested alternatives are Lorazepam 0.5mg tablet, clonazepam 0.5mg tablet. Please review and send a new script.

## 2022-04-24 NOTE — TELEPHONE ENCOUNTER
Left message on patient's voicemail to call me. I would like to speak with patient before any change in medication.

## 2022-04-25 ENCOUNTER — TELEPHONE (OUTPATIENT)
Dept: PRIMARY CARE CLINIC | Age: 72
End: 2022-04-25

## 2022-04-25 DIAGNOSIS — F41.1 GENERALIZED ANXIETY DISORDER: ICD-10-CM

## 2022-04-25 RX ORDER — ALPRAZOLAM 1 MG/1
TABLET ORAL
Qty: 45 TABLET | Refills: 2 | Status: CANCELLED | OUTPATIENT
Start: 2022-04-25 | End: 2022-05-25

## 2022-04-25 NOTE — TELEPHONE ENCOUNTER
Pt states they received a call from Dr Katt Sun yesterday about his ALPRAZolam Trenia Fury) 1 MG tablet that his insurance will not pay   Pt states it ok it one of his least expensive he ok paying out of pocket

## 2022-04-27 DIAGNOSIS — J06.9 UPPER RESPIRATORY TRACT INFECTION, UNSPECIFIED TYPE: ICD-10-CM

## 2022-04-27 RX ORDER — FLUTICASONE PROPIONATE 50 MCG
SPRAY, SUSPENSION (ML) NASAL
Qty: 48 G | Refills: 1 | Status: SHIPPED | OUTPATIENT
Start: 2022-04-27 | End: 2022-11-04 | Stop reason: SDUPTHER

## 2022-04-27 NOTE — TELEPHONE ENCOUNTER
Call patient. I received his response. It is ok to continue Xanax and pay out of pocket for the prescription. He should have a refill left on his current prescription. He needs to let his Pharmacist know he will pay out of pocket for the Xanax when he requests the refill.

## 2022-04-27 NOTE — TELEPHONE ENCOUNTER
Medication:   Requested Prescriptions     Pending Prescriptions Disp Refills    fluticasone (FLONASE) 50 MCG/ACT nasal spray [Pharmacy Med Name: FLUTICASONE PROP 50 MCG SPRAY]  1     Sig: SPRAY 2 SPRAYS BY NASAL ROUTE DAILY. Last Filled:  1/28/2022    Last appt: 2/17/2022   Next appt: 5/17/2022    Last OARRS:   RX Monitoring 2/17/2022   Attestation -   Periodic Controlled Substance Monitoring No signs of potential drug abuse or diversion identified.

## 2022-05-03 DIAGNOSIS — E03.9 ACQUIRED HYPOTHYROIDISM: ICD-10-CM

## 2022-05-03 RX ORDER — LEVOTHYROXINE SODIUM 0.1 MG/1
TABLET ORAL
Qty: 90 TABLET | Refills: 1 | Status: SHIPPED | OUTPATIENT
Start: 2022-05-03 | End: 2022-09-07

## 2022-05-03 NOTE — TELEPHONE ENCOUNTER
Medication:   Requested Prescriptions     Pending Prescriptions Disp Refills    levothyroxine (SYNTHROID) 100 MCG tablet [Pharmacy Med Name: LEVOTHYROXINE 100 MCG TABLET] 90 tablet 1     Sig: TAKE 1 TABLET BY MOUTH EVERY DAY     Last Filled:  11/01/2021    Last appt: 2/17/2022   Next appt: 5/17/2022    Last OARRS:   RX Monitoring 2/17/2022   Attestation -   Periodic Controlled Substance Monitoring No signs of potential drug abuse or diversion identified.

## 2022-05-05 ENCOUNTER — TELEPHONE (OUTPATIENT)
Dept: DERMATOLOGY | Age: 72
End: 2022-05-05

## 2022-05-05 NOTE — TELEPHONE ENCOUNTER
Called and left message for patient to call back office. Please offer opening on 5/25/22 if still opening.
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
## 2022-05-17 ENCOUNTER — OFFICE VISIT (OUTPATIENT)
Dept: PRIMARY CARE CLINIC | Age: 72
End: 2022-05-17
Payer: MEDICARE

## 2022-05-17 VITALS
SYSTOLIC BLOOD PRESSURE: 153 MMHG | TEMPERATURE: 97 F | DIASTOLIC BLOOD PRESSURE: 93 MMHG | HEIGHT: 67 IN | OXYGEN SATURATION: 97 % | RESPIRATION RATE: 16 BRPM | WEIGHT: 197.2 LBS | HEART RATE: 72 BPM | BODY MASS INDEX: 30.95 KG/M2

## 2022-05-17 DIAGNOSIS — E78.2 MIXED HYPERLIPIDEMIA: ICD-10-CM

## 2022-05-17 DIAGNOSIS — R53.83 FATIGUE, UNSPECIFIED TYPE: ICD-10-CM

## 2022-05-17 DIAGNOSIS — N18.31 STAGE 3A CHRONIC KIDNEY DISEASE (HCC): ICD-10-CM

## 2022-05-17 DIAGNOSIS — Z13.6 SCREENING FOR AAA (AORTIC ABDOMINAL ANEURYSM): ICD-10-CM

## 2022-05-17 DIAGNOSIS — E03.9 ACQUIRED HYPOTHYROIDISM: ICD-10-CM

## 2022-05-17 DIAGNOSIS — J40 BRONCHITIS: ICD-10-CM

## 2022-05-17 DIAGNOSIS — J44.9 CHRONIC OBSTRUCTIVE PULMONARY DISEASE, UNSPECIFIED COPD TYPE (HCC): ICD-10-CM

## 2022-05-17 DIAGNOSIS — J06.9 UPPER RESPIRATORY TRACT INFECTION, UNSPECIFIED TYPE: ICD-10-CM

## 2022-05-17 DIAGNOSIS — F41.1 GENERALIZED ANXIETY DISORDER: ICD-10-CM

## 2022-05-17 DIAGNOSIS — I50.22 CHRONIC SYSTOLIC HEART FAILURE (HCC): ICD-10-CM

## 2022-05-17 DIAGNOSIS — R73.03 PREDIABETES: ICD-10-CM

## 2022-05-17 DIAGNOSIS — I10 ESSENTIAL HYPERTENSION: Primary | ICD-10-CM

## 2022-05-17 PROBLEM — N18.30 CHRONIC RENAL DISEASE, STAGE III (HCC): Status: ACTIVE | Noted: 2022-05-17

## 2022-05-17 PROCEDURE — 99214 OFFICE O/P EST MOD 30 MIN: CPT | Performed by: INTERNAL MEDICINE

## 2022-05-17 PROCEDURE — 1123F ACP DISCUSS/DSCN MKR DOCD: CPT | Performed by: INTERNAL MEDICINE

## 2022-05-17 RX ORDER — BROMPHENIRAMINE MALEATE, PSEUDOEPHEDRINE HYDROCHLORIDE, AND DEXTROMETHORPHAN HYDROBROMIDE 2; 30; 10 MG/5ML; MG/5ML; MG/5ML
5 SYRUP ORAL 4 TIMES DAILY PRN
Qty: 120 ML | Refills: 0 | Status: SHIPPED | OUTPATIENT
Start: 2022-05-17 | End: 2022-08-04

## 2022-05-17 RX ORDER — LOSARTAN POTASSIUM 100 MG/1
100 TABLET ORAL DAILY
Qty: 30 TABLET | Refills: 0 | Status: SHIPPED | OUTPATIENT
Start: 2022-05-17 | End: 2022-06-08

## 2022-05-17 RX ORDER — ALPRAZOLAM 1 MG/1
TABLET ORAL
COMMUNITY
Start: 2022-05-03 | End: 2022-06-07

## 2022-05-17 RX ORDER — AMOXICILLIN AND CLAVULANATE POTASSIUM 875; 125 MG/1; MG/1
1 TABLET, FILM COATED ORAL 2 TIMES DAILY
Qty: 20 TABLET | Refills: 0 | Status: SHIPPED | OUTPATIENT
Start: 2022-05-17 | End: 2022-05-27

## 2022-05-17 NOTE — PROGRESS NOTES
Derrick Koch   Date ofBirth:  1950    Date of Visit:  5/17/2022    Chief Complaint   Patient presents with    Anxiety    Hypertension    COPD    Hypothyroidism    Hyperlipidemia    Congestion     Was taking prednisone     Fatigue    Shortness of Breath     When doing activity       HPI  Patient has hypertension. Patient takes losartan 50 mg once daily and carvedilol 6.25 mg 2 times daily. Patient states his BP has been high the past 3-4 weeks. Patient states it has been 143/99, 148/98, and 156/90. Patient states these blood pressures were taken by a school nurse and a neighbor who is a retired RN. Patient decreases salt. Patient is not exercising. Patient has anxiety. Patient takes Xanax 1 mg 1 tablet nightly and 1/2 tablet every day. Patient worries a lot and has anxiety. Patient does not like the way antidepressants make him feel when he has taken them for anxiety in the past.  Patient denies side effects. Patient has COPD. Patient takes Symbicort 160-4.5 mcg HFA inhaler 2 puffs twice daily and ProAir HFA inhaler as needed. Patient states he has shortness of breath with activity. Patient states last week he picked up a bronchial infection. Patient complains of cough and chest congestion. Patient used his nebulizer. Patient states he took a prednisone taper prescribed by Orthopedics. Patient states it helped the bronchial infection. Patient states he is blowing out dark green nasal discharge since 5/2/22. Patient states he has stuffy nose, runny nose, and cough with clear phlegm. Patient states he uses Flonase nasal spray which helps his stuffy nose. Patient denies fever. Patient states he took a Covid test yesterday and it was negative. Patient has hypothyroidism. Patient takes Levothyroxine 100mcg once daily. Patient complains of fatigue and weight gain. Patient denies constipation and cold intolerance. Patient has chronic kidney disease.  Patient avoids NSAID's. Patient complains of fatigue and weight gain. Patient states he takes Centrum, Coenzyme Q 10, and vitamin B complex. Patient states he gets up 4:00-5:00am and eats breakfast 7:00am and goes back to bed 9:00am and sleeps 2 more hours and is lethargic and tired when he gets up. Patient started vitamin B12  2 days ago and thinks it may be giving him energy. Patient has hyperlipidemia. Patient takes Atorvastatin 40mg nightly. Patient decreases fat and cholesterol. Patient has prediabetes. Patient decreases carbohydrates. Patient has CHF. Patient sees Cardiology. Patient takes Torsemide, Carvedilol, and Spironolactone. Review of Systems   Constitutional: Positive for fatigue and unexpected weight change. Negative for activity change, appetite change, chills and fever. HENT: Positive for congestion and rhinorrhea. Negative for ear pain, postnasal drip, sinus pressure, sinus pain, sneezing, sore throat and trouble swallowing. Eyes: Negative for visual disturbance. Respiratory: Positive for cough and shortness of breath. Negative for chest tightness and wheezing. Cardiovascular: Negative for chest pain, palpitations and leg swelling. Gastrointestinal: Negative for abdominal pain, constipation, diarrhea, nausea and vomiting. Endocrine: Negative for cold intolerance and heat intolerance. Genitourinary: Negative for dysuria, frequency and hematuria. Musculoskeletal: Negative for arthralgias and myalgias. Neurological: Negative for dizziness, syncope, light-headedness, numbness and headaches. Psychiatric/Behavioral: Negative for decreased concentration, dysphoric mood and sleep disturbance. The patient is nervous/anxious.         Allergies   Allergen Reactions    Latex Rash    Contrast [Gadolinium Derivatives] Shortness Of Breath and Anxiety    Soap Rash     Outpatient Medications Marked as Taking for the 5/17/22 encounter (Office Visit) with Devi Farias MD   Medication Sig Dispense Refill    ALPRAZolam (XANAX) 1 MG tablet TAKE 1/2 TABLET BY MOUTH EVERY DAY AND THEN TAKE 1 TABLET AT BEDTIME      levothyroxine (SYNTHROID) 100 MCG tablet TAKE 1 TABLET BY MOUTH EVERY DAY 90 tablet 1    fluticasone (FLONASE) 50 MCG/ACT nasal spray SPRAY 2 SPRAYS BY NASAL ROUTE DAILY. 48 g 1    colchicine (COLCRYS) 0.6 MG tablet TAKE 2 TABLETS ONCE THEN REPEAT 1 TABLET 1 HOUR LATER ONCE 15 tablet 1    SYMBICORT 160-4.5 MCG/ACT AERO TAKE 2 PUFFS BY MOUTH TWICE A DAY 3 each 3    Multiple Vitamins-Minerals (CENTRUM SILVER 50+MEN) TABS Take by mouth daily       Coenzyme Q10 (COQ10 PO) Take by mouth      albuterol (PROVENTIL) (2.5 MG/3ML) 0.083% nebulizer solution Take 3 mLs by nebulization every 6 hours as needed for Wheezing or Shortness of Breath 75 each 2    atorvastatin (LIPITOR) 40 MG tablet TAKE 1 TABLET BY MOUTH EVERY DAY AT NIGHT 90 tablet 1    tiZANidine (ZANAFLEX) 2 MG tablet Take 1 tablet by mouth 2 times daily as needed (pain) 60 tablet 3    acetaminophen (TYLENOL) 500 MG tablet Take 2 tablets by mouth 3 times daily as needed for Pain      desoximetasone (TOPICORT) 0.25 % cream APPLY TO AFFECTED AREA(S) TWO TIMES A DAY FOR 2 WEEKS OR UNTIL IMPROVED 60 g 2    albuterol sulfate HFA (PROAIR HFA) 108 (90 Base) MCG/ACT inhaler Inhale 2 puffs into the lungs every 6 hours as needed for Wheezing or Shortness of Breath 1 Inhaler 5    Blood Pressure Monitoring (BLOOD PRESSURE MONITOR/M CUFF) MISC Use to monitor blood pressure 1 each 0    clobetasol (TEMOVATE) 0.05 % ointment Apply to affected areas of the skin twice daily until improved.  60 g 2    carvedilol (COREG) 6.25 MG tablet Take 1 tablet by mouth 2 times daily (with meals)      torsemide (DEMADEX) 20 MG tablet Take 2 tablets by mouth daily      losartan (COZAAR) 25 MG tablet Take 25 mg by mouth 2 times daily       spironolactone (ALDACTONE) 25 MG tablet Take 0.5 tablets by mouth daily      potassium chloride (KLOR-CON M) 10 MEQ extended release tablet Take 1 tablet by mouth daily 30 tablet 3         Vitals:    05/17/22 1056 05/17/22 1057   BP: (!) 142/92 (!) 153/93   Pulse: 72    Resp: 16    Temp: 97 °F (36.1 °C)    SpO2: 97%    Weight: 197 lb 3.2 oz (89.4 kg)    Height: 5' 7\" (1.702 m)      Body mass index is 30.89 kg/m². Physical Exam  Nursing note reviewed. Constitutional:       General: He is not in acute distress. Appearance: Normal appearance. He is well-developed. HENT:      Right Ear: Tympanic membrane and ear canal normal.      Left Ear: Tympanic membrane and ear canal normal.      Nose:      Right Sinus: No maxillary sinus tenderness. Left Sinus: No maxillary sinus tenderness. Eyes:      General: Lids are normal.      Extraocular Movements: Extraocular movements intact. Conjunctiva/sclera: Conjunctivae normal.      Pupils: Pupils are equal, round, and reactive to light. Neck:      Thyroid: No thyromegaly. Vascular: No carotid bruit. Cardiovascular:      Rate and Rhythm: Normal rate and regular rhythm. Heart sounds: Normal heart sounds, S1 normal and S2 normal. No murmur heard. No friction rub. No gallop. Pulmonary:      Effort: Pulmonary effort is normal. No respiratory distress. Breath sounds: Normal breath sounds. No wheezing, rhonchi or rales. Abdominal:      General: Bowel sounds are normal. There is no distension. Palpations: Abdomen is soft. Tenderness: There is no abdominal tenderness. Musculoskeletal:      Cervical back: Neck supple. Right lower leg: No edema. Left lower leg: No edema. Lymphadenopathy:      Head:      Right side of head: No submandibular adenopathy. Left side of head: No submandibular adenopathy. Neurological:      Mental Status: He is alert. Psychiatric:         Mood and Affect: Mood normal.           No results found for this visit on 05/17/22. Lab Review   No visits with results within 6 Month(s) from this visit. Latest known visit with results is:   Abstract on 11/15/2021   Component Date Value    Sodium 11/12/2021 144     Chloride 11/12/2021 105     Potassium 11/12/2021 4.8     BUN 11/12/2021 29     CREATININE 11/12/2021 1.43     Glucose 11/12/2021 95     Calcium 11/12/2021 9.6     CO2 11/12/2021 30.3     Gfr Calculated 11/12/2021 49     Anion Gap 11/12/2021 9          Assessment/Plan     1. Essential hypertension  -stable  -Continue losartan (COZAAR) 100 MG tablet; Take 1 tablet by mouth daily  Dispense: 30 tablet; Refill: 0  -Low sodium diet  -Regular aerobic exercise  - Comprehensive Metabolic Panel; Future    2. Generalized anxiety disorder  -stable  -Continue Xanax 1 mg 1 tablet nightly and 1/2 tablet every day. 3. Chronic obstructive pulmonary disease, unspecified [facility] type ([facility]  -stable  -Continue Symbicort 160-4.5 mcg HFA inhaler 2 puffs twice daily and ProAir HFA inhaler as needed.  -continue ProAir HFA inhaler 2 puffs every 6 hours as needed    4. Bronchitis  - brompheniramine-pseudoephedrine-DM (BROMFED DM) 2-30-10 MG/5ML syrup; Take 5 mLs by mouth 4 times daily as needed for Congestion or Cough  Dispense: 120 mL; Refill: 0  - amoxicillin-clavulanate (AUGMENTIN) 875-125 MG per tablet; Take 1 tablet by mouth 2 times daily for 10 days  Dispense: 20 tablet; Refill: 0  -ProAir HFA inhaler 2 puffs every 6 hours as needed    5. Upper respiratory tract infection, unspecified type  - brompheniramine-pseudoephedrine-DM (BROMFED DM) 2-30-10 MG/5ML syrup; Take 5 mLs by mouth 4 times daily as needed for Congestion or Cough  Dispense: 120 mL; Refill: 0  - amoxicillin-clavulanate (AUGMENTIN) 875-125 MG per tablet; Take 1 tablet by mouth 2 times daily for 10 days  Dispense: 20 tablet; Refill: 0    6. Acquired hypothyroidism  -stable  -Continue Levothyroxine 100mcg once daily  - TSH; Future    7.  Stage 3a chronic kidney disease (HCC)  -stable  -Avoid NSAID's such as otc Ibuprofen, Advil, Motrin, Naprosyn, and Aleve as well as prescription NSAID's  -Comprehensive Metabolic Panel; Future    8. Fatigue, unspecified type  - Comprehensive Metabolic Panel; Future  - CBC with Auto Differential; Future  - Vitamin B12; Future  -multivitamin once daily  -Regular aerobic exercise    9. Mixed hyperlipidemia  -stable  -Continue Atorvastatin 40mg nightly  -Low fat, low cholesterol diet  -Regular aerobic exercise  - Comprehensive Metabolic Panel; Future  - Lipid Panel; Future    10. Prediabetes  -Low carbohydrate diet  -Regular aerobic exercise  - Hemoglobin A1C; Future    11. Chronic systolic heart failure (HCC)  -stable  -Continue Torsemide, Carvedilol, and Spironolactone  prescribed by Cardiology  -continue care per Cardiology    2. Screening for AAA (aortic abdominal aneurysm)  - US SCREENING FOR AAA; Future      Discussed medications with patient, who voiced understanding of their use and indications. All questions answered. Return in about 3 weeks (around 6/7/2022) for hypertension and fatigue.

## 2022-05-17 NOTE — PATIENT INSTRUCTIONS
Patient Education        Fatigue: Care Instructions  Your Care Instructions     Fatigue is a feeling of tiredness, exhaustion, or lack of energy. You may feel fatigue because of too much or not enough activity. It can also come from stress, lack of sleep, boredom, and poor diet. Many medical problems, such as viral infections, can cause fatigue. Emotional problems, especially depression,are often the cause of fatigue. Fatigue is most often a symptom of another problem. Treatment for fatigue depends on the cause. For example, if you have fatigue because you have a certain health problem, treating this problem also treats your fatigue. Ifdepression or anxiety is the cause, treatment may help. Follow-up care is a key part of your treatment and safety. Be sure to make and go to all appointments, and call your doctor if you are having problems. It's also a good idea to know your test results and keep alist of the medicines you take. How can you care for yourself at home?  Get regular exercise. But don't overdo it. Go back and forth between rest and exercise.  Get plenty of rest.   Eat a healthy diet. Do not skip meals, especially breakfast.   Reduce your use of caffeine, tobacco, and alcohol. Caffeine is most often found in coffee, tea, cola drinks, and chocolate.  Limit medicines that can cause fatigue. This includes tranquilizers and cold and allergy medicines. When should you call for help? Watch closely for changes in your health, and be sure to contact your doctor if:     You have new symptoms such as fever or a rash.      Your fatigue gets worse.      You have been feeling down, depressed, or hopeless. Or you may have lost interest in things that you usually enjoy.      You are not getting better as expected. Where can you learn more? Go to [email or web address].[employer]. org and sign in to your [employer] account.  Enter C115 in the [employer] box to learn more about \"Fatigue: Care Instructions. \"     If you do not have an account, please click on the \"Sign Up Now\" link. Current as of: July 1, 2021               Content Version: 13.2  © 2006-2022 [employer]. Care instructions adapted under license by [facility] ([facility]). If you have questions about a medical condition or this instruction, always ask your healthcare professional. [address] any warranty or liability for your use of this information.

## 2022-05-23 DIAGNOSIS — N18.31 STAGE 3A CHRONIC KIDNEY DISEASE (HCC): ICD-10-CM

## 2022-05-23 DIAGNOSIS — I10 ESSENTIAL HYPERTENSION: ICD-10-CM

## 2022-05-23 DIAGNOSIS — E03.9 ACQUIRED HYPOTHYROIDISM: ICD-10-CM

## 2022-05-23 DIAGNOSIS — R53.83 FATIGUE, UNSPECIFIED TYPE: ICD-10-CM

## 2022-05-23 DIAGNOSIS — E78.2 MIXED HYPERLIPIDEMIA: ICD-10-CM

## 2022-05-23 DIAGNOSIS — R73.03 PREDIABETES: ICD-10-CM

## 2022-05-23 LAB
ABSOLUTE BASO #: 0 10^3/UL (ref 0–0.2)
ABSOLUTE EOS #: 0.3 10^3/UL (ref 0–0.5)
ABSOLUTE LYMPH #: 1.5 10^3/UL (ref 1–4.8)
ABSOLUTE MONO #: 0.7 10^3/UL (ref 0–0.8)
ABSOLUTE NEUT #: 6.1 10^3/UL (ref 1.8–7.7)
AGE TIME: 71 YRS
ALBUMIN SERPL-MCNC: 3.8 G/DL
ALBUMIN SERPL-MCNC: 3.8 G/DL (ref 3.5–5)
ALP BLD-CCNC: 125 U/L
ALP BLD-CCNC: 125 U/L (ref 46–116)
ALT SERPL-CCNC: 20 U/L
ALT SERPL-CCNC: 20 U/L (ref 21–72)
ANION GAP SERPL CALCULATED.3IONS-SCNC: 5 MMOL/L
ANION GAP SERPL CALCULATED.3IONS-SCNC: 5 MMOL/L (ref 7–16)
AST SERPL-CCNC: 17 U/L
AST SERPL-CCNC: 17 U/L (ref 17–59)
AVERAGE GLUCOSE: NORMAL
BASOPHILS RELATIVE PERCENT: 0.4 % (ref 0–1)
BILIRUB SERPL-MCNC: 0.44 MG/DL (ref 0.1–1.2)
BILIRUB SERPL-MCNC: 0.44 MG/DL (ref 0.1–1.4)
BUN BLDV-MCNC: 18 MG/DL
BUN BLDV-MCNC: 18 MG/DL (ref 7–20)
CALCIUM SERPL-MCNC: 9 MG/DL
CALCIUM SERPL-MCNC: 9 MG/DL (ref 8.5–10.1)
CBC AUTO DIF: ABNORMAL
CHLORIDE BLD-SCNC: 101 MMOL/L
CHLORIDE BLD-SCNC: 101 MMOL/L (ref 98–108)
CHOLESTEROL, TOTAL: 164 MG/DL
CHOLESTEROL, TOTAL: 164 MG/DL (ref 0–200)
CHOLESTEROL/HDL RATIO: NORMAL
CO2: 31.8 MMOL/L
CO2: 31.8 MMOL/L (ref 21–32)
COMPREHENSIVE METABOLIC PANEL: ABNORMAL
CREAT SERPL-MCNC: 1.27 MG/DL
CREAT SERPL-MCNC: 1.27 MG/DL (ref 0.7–1.3)
DIFFERENTIAL, MANUAL: ABNORMAL
EOSINOPHILS ABSOLUTE: 3.1 % (ref 0–5)
ESTIMATED AVERAGE GLUCOSE: 125.5
GFR AFRICAN AMERICAN: 68 ML/MIN
GFR CALCULATED: 56
GFR NON-AFRICAN AMERICAN: 56 ML/MIN
GLUCOSE BLD-MCNC: 99 MG/DL
GLUCOSE BLD-MCNC: 99 MG/DL (ref 70–110)
HBA1C MFR BLD: 6 %
HBA1C MFR BLD: 6 % (ref 4.5–6.2)
HCT VFR BLD CALC: 47.1 % (ref 40–52)
HDLC SERPL-MCNC: 60 MG/DL (ref 35–70)
HDLC SERPL-MCNC: 60 MG/DL (ref 40–100)
HEMOGLOBIN: 16.2 G/DL (ref 13.3–17.7)
LDL CHOLESTEROL CALCULATED: 75 MG/DL (ref 0–160)
LDL CHOLESTEROL DIRECT: 75 MG/DL (ref 0–160)
LEUKOCYTES, BLD: 8.5 K/UL (ref 4.5–11)
LIPID PANEL: NORMAL
LYMPHOCYTES ABSOLUTE: 17.1 % (ref 15–42)
MCHC RBC AUTO-ENTMCNC: 30.5 PG (ref 26–34)
MCHC RBC AUTO-ENTMCNC: 34.5 G/DL (ref 31–36)
MCV RBC AUTO: 88.3 FL (ref 80–100)
MONOCYTES ABSOLUTE: 7.8 % (ref 0–8)
NEUTROPHILS SEGMENTED: 71.6 % (ref 40–70)
NONHDLC SERPL-MCNC: NORMAL MG/DL
PDW BLD-RTO: 14.6 % (ref 11–15.3)
PLATELET # BLD: 207 K/UL (ref 150–450)
PMV BLD AUTO: 9 FL (ref 7.4–10.4)
POTASSIUM SERPL-SCNC: 4.2 MMOL/L
POTASSIUM SERPL-SCNC: 4.2 MMOL/L (ref 3.6–5)
RBC # BLD: 5.33 M/UL (ref 4.4–5.9)
SODIUM BLD-SCNC: 138 MMOL/L
SODIUM BLD-SCNC: 138 MMOL/L (ref 137–148)
THYROTROPIN RELEASING HORMONE: 2.68 UIU/ML (ref 0.34–4.82)
TOTAL PROTEIN: 7.1
TOTAL PROTEIN: 7.1 G/DL (ref 6.4–8.2)
TRIGL SERPL-MCNC: 145 MG/DL
TRIGL SERPL-MCNC: 145 MG/DL (ref 0–150)
TSH SERPL DL<=0.05 MIU/L-ACNC: 2.68 UIU/ML
VITAMIN B-12: 376
VITAMIN B-12: 376 PG/ML (ref 193–986)
VLDLC SERPL CALC-MCNC: 29 MG/DL
VLDLC SERPL CALC-MCNC: 29 MG/DL

## 2022-05-26 PROBLEM — J40 BRONCHITIS: Status: ACTIVE | Noted: 2022-05-26

## 2022-05-26 ASSESSMENT — ENCOUNTER SYMPTOMS
SINUS PRESSURE: 0
TROUBLE SWALLOWING: 0
VOMITING: 0
SINUS PAIN: 0
NAUSEA: 0
WHEEZING: 0
DIARRHEA: 0
COUGH: 1
SHORTNESS OF BREATH: 1
SORE THROAT: 0
ABDOMINAL PAIN: 0
CONSTIPATION: 0
RHINORRHEA: 1
CHEST TIGHTNESS: 0

## 2022-05-28 DIAGNOSIS — E78.2 MIXED HYPERLIPIDEMIA: ICD-10-CM

## 2022-05-31 RX ORDER — ATORVASTATIN CALCIUM 40 MG/1
TABLET, FILM COATED ORAL
Qty: 90 TABLET | Refills: 1 | Status: SHIPPED | OUTPATIENT
Start: 2022-05-31 | End: 2022-11-04 | Stop reason: SDUPTHER

## 2022-05-31 NOTE — TELEPHONE ENCOUNTER
Medication:   Requested Prescriptions     Pending Prescriptions Disp Refills    atorvastatin (LIPITOR) 40 MG tablet [Pharmacy Med Name: ATORVASTATIN 40 MG TABLET] 90 tablet 1     Sig: TAKE 1 TABLET BY MOUTH EVERY DAY AT NIGHT     Last Filled:  11/24/2021    Last appt: 5/17/2022   Next appt: 6/9/2022    Last OARRS:   RX Monitoring 2/17/2022   Attestation -   Periodic Controlled Substance Monitoring No signs of potential drug abuse or diversion identified.

## 2022-06-06 DIAGNOSIS — F41.1 GENERALIZED ANXIETY DISORDER: ICD-10-CM

## 2022-06-06 NOTE — TELEPHONE ENCOUNTER
Medication:   Requested Prescriptions     Pending Prescriptions Disp Refills    ALPRAZolam (XANAX) 1 MG tablet [Pharmacy Med Name: ALPRAZOLAM 1 MG TABLET] 45 tablet 2     Sig: TAKE 1/2 TABLET BY MOUTH EVERY DAY AND THEN TAKE 1 TABLET AT BEDTIME     Last Filled:  5/17/2022    Last appt: 5/17/2022  Next appt: 6/9/2022    Last OARRS:   RX Monitoring 2/17/2022   Attestation -   Periodic Controlled Substance Monitoring No signs of potential drug abuse or diversion identified.

## 2022-06-07 RX ORDER — ALPRAZOLAM 1 MG/1
TABLET ORAL
Qty: 45 TABLET | Refills: 2 | Status: SHIPPED | OUTPATIENT
Start: 2022-06-07 | End: 2022-09-13

## 2022-06-07 NOTE — TELEPHONE ENCOUNTER
Controlled Substance Monitoring:    Acute and Chronic Pain Monitoring:   RX Monitoring 6/7/2022   Attestation -   Periodic Controlled Substance Monitoring No signs of potential drug abuse or diversion identified.

## 2022-06-08 DIAGNOSIS — I10 ESSENTIAL HYPERTENSION: ICD-10-CM

## 2022-06-08 RX ORDER — LOSARTAN POTASSIUM 100 MG/1
TABLET ORAL
Qty: 90 TABLET | Refills: 1 | Status: SHIPPED | OUTPATIENT
Start: 2022-06-08

## 2022-06-08 NOTE — TELEPHONE ENCOUNTER
Medication:   Requested Prescriptions     Pending Prescriptions Disp Refills    losartan (COZAAR) 100 MG tablet [Pharmacy Med Name: LOSARTAN POTASSIUM 100 MG TAB] 30 tablet 0     Sig: TAKE 1 TABLET BY MOUTH EVERY DAY     Last Filled: 5.17.22    Last appt: 5/17/2022   Next appt: 6/9/2022    Last OARRS:   RX Monitoring 6/7/2022   Attestation -   Periodic Controlled Substance Monitoring No signs of potential drug abuse or diversion identified.

## 2022-06-09 ENCOUNTER — HOSPITAL ENCOUNTER (OUTPATIENT)
Dept: GENERAL RADIOLOGY | Age: 72
Discharge: HOME | End: 2022-06-09
Payer: MEDICARE

## 2022-06-09 ENCOUNTER — OFFICE VISIT (OUTPATIENT)
Dept: PRIMARY CARE CLINIC | Age: 72
End: 2022-06-09
Payer: MEDICARE

## 2022-06-09 VITALS
HEART RATE: 79 BPM | RESPIRATION RATE: 16 BRPM | DIASTOLIC BLOOD PRESSURE: 88 MMHG | OXYGEN SATURATION: 99 % | BODY MASS INDEX: 30.73 KG/M2 | SYSTOLIC BLOOD PRESSURE: 126 MMHG | WEIGHT: 195.8 LBS | TEMPERATURE: 96.8 F | HEIGHT: 67 IN

## 2022-06-09 DIAGNOSIS — R05.9 COUGH: ICD-10-CM

## 2022-06-09 DIAGNOSIS — I10 ESSENTIAL HYPERTENSION: Primary | ICD-10-CM

## 2022-06-09 DIAGNOSIS — R53.83 FATIGUE, UNSPECIFIED TYPE: ICD-10-CM

## 2022-06-09 PROCEDURE — 99214 OFFICE O/P EST MOD 30 MIN: CPT | Performed by: INTERNAL MEDICINE

## 2022-06-09 PROCEDURE — 1123F ACP DISCUSS/DSCN MKR DOCD: CPT | Performed by: INTERNAL MEDICINE

## 2022-06-09 PROCEDURE — 71046 X-RAY EXAM CHEST 2 VIEWS: CPT

## 2022-06-09 RX ORDER — GUAIFENESIN 600 MG/1
600 TABLET, EXTENDED RELEASE ORAL 2 TIMES DAILY
Qty: 20 TABLET | Refills: 0 | Status: SHIPPED | OUTPATIENT
Start: 2022-06-09 | End: 2022-11-04 | Stop reason: ALTCHOICE

## 2022-06-09 RX ORDER — BENZONATATE 100 MG/1
100 CAPSULE ORAL 3 TIMES DAILY PRN
Qty: 30 CAPSULE | Refills: 0 | Status: SHIPPED | OUTPATIENT
Start: 2022-06-09 | End: 2022-06-16

## 2022-06-09 SDOH — ECONOMIC STABILITY: FOOD INSECURITY: WITHIN THE PAST 12 MONTHS, THE FOOD YOU BOUGHT JUST DIDN'T LAST AND YOU DIDN'T HAVE MONEY TO GET MORE.: NEVER TRUE

## 2022-06-09 SDOH — ECONOMIC STABILITY: FOOD INSECURITY: WITHIN THE PAST 12 MONTHS, YOU WORRIED THAT YOUR FOOD WOULD RUN OUT BEFORE YOU GOT MONEY TO BUY MORE.: NEVER TRUE

## 2022-06-09 ASSESSMENT — PATIENT HEALTH QUESTIONNAIRE - PHQ9
3. TROUBLE FALLING OR STAYING ASLEEP: 0
8. MOVING OR SPEAKING SO SLOWLY THAT OTHER PEOPLE COULD HAVE NOTICED. OR THE OPPOSITE, BEING SO FIGETY OR RESTLESS THAT YOU HAVE BEEN MOVING AROUND A LOT MORE THAN USUAL: 0
SUM OF ALL RESPONSES TO PHQ QUESTIONS 1-9: 7
4. FEELING TIRED OR HAVING LITTLE ENERGY: 3
6. FEELING BAD ABOUT YOURSELF - OR THAT YOU ARE A FAILURE OR HAVE LET YOURSELF OR YOUR FAMILY DOWN: 0
9. THOUGHTS THAT YOU WOULD BE BETTER OFF DEAD, OR OF HURTING YOURSELF: 0
SUM OF ALL RESPONSES TO PHQ9 QUESTIONS 1 & 2: 4
SUM OF ALL RESPONSES TO PHQ QUESTIONS 1-9: 7
7. TROUBLE CONCENTRATING ON THINGS, SUCH AS READING THE NEWSPAPER OR WATCHING TELEVISION: 0
5. POOR APPETITE OR OVEREATING: 0
10. IF YOU CHECKED OFF ANY PROBLEMS, HOW DIFFICULT HAVE THESE PROBLEMS MADE IT FOR YOU TO DO YOUR WORK, TAKE CARE OF THINGS AT HOME, OR GET ALONG WITH OTHER PEOPLE: 0
2. FEELING DOWN, DEPRESSED OR HOPELESS: 1
1. LITTLE INTEREST OR PLEASURE IN DOING THINGS: 3

## 2022-06-09 ASSESSMENT — SOCIAL DETERMINANTS OF HEALTH (SDOH): HOW HARD IS IT FOR YOU TO PAY FOR THE VERY BASICS LIKE FOOD, HOUSING, MEDICAL CARE, AND HEATING?: NOT HARD AT ALL

## 2022-06-09 NOTE — PROGRESS NOTES
Adrian Dyson   Date ofBirth:  1950    Date of Visit:  6/9/2022    Chief Complaint   Patient presents with    Hypertension    Fatigue       HPI  Patient has hypertension. Patient's BP was high last visit. Patient has increased Losartan to 100mg once daily. Patient takes Carvedilol 6.25mg 2 times daily. Patient decreases salt. Patient has fatigue. Patient states his fatigue is same. Patient states he has walked twice since last visit. Patient takes vitamins inconsistently. Patient had labs done. Patient states he feels like he can't clear his phlegm. Patient Has cough and tries to clear phlegm and cant always get it. Patient has coughing spells. Patient denies SOB, wheezing, runny nose, stuffy nose, and post nasal drainage. Patient states he has an echocardiogram and Cardiology appointment scheduled for August.     Review of Systems   Constitutional: Positive for fatigue. Negative for chills and fever. HENT: Negative for congestion, postnasal drip, rhinorrhea and sore throat. Eyes: Negative for visual disturbance. Respiratory: Positive for cough. Negative for chest tightness, shortness of breath and wheezing. Cardiovascular: Negative for chest pain, palpitations and leg swelling. Gastrointestinal: Negative for abdominal pain, constipation, diarrhea, nausea and vomiting. Genitourinary: Negative for dysuria, frequency and hematuria. Musculoskeletal: Negative for arthralgias and myalgias. Neurological: Negative for dizziness, syncope, light-headedness, numbness and headaches. Psychiatric/Behavioral: Negative for dysphoric mood and sleep disturbance. The patient is not nervous/anxious.         Allergies   Allergen Reactions    Latex Rash    Contrast [Gadolinium Derivatives] Shortness Of Breath and Anxiety    Soap Rash     Outpatient Medications Marked as Taking for the 6/9/22 encounter (Office Visit) with Tita Cleveland MD   Medication Sig Dispense Refill    losartan (COZAAR) 100 MG tablet TAKE 1 TABLET BY MOUTH EVERY DAY 90 tablet 1    ALPRAZolam (XANAX) 1 MG tablet TAKE 1/2 TABLET BY MOUTH EVERY DAY AND THEN TAKE 1 TABLET AT BEDTIME 45 tablet 2    atorvastatin (LIPITOR) 40 MG tablet TAKE 1 TABLET BY MOUTH EVERY DAY AT NIGHT 90 tablet 1    brompheniramine-pseudoephedrine-DM (BROMFED DM) 2-30-10 MG/5ML syrup Take 5 mLs by mouth 4 times daily as needed for Congestion or Cough 120 mL 0    levothyroxine (SYNTHROID) 100 MCG tablet TAKE 1 TABLET BY MOUTH EVERY DAY 90 tablet 1    fluticasone (FLONASE) 50 MCG/ACT nasal spray SPRAY 2 SPRAYS BY NASAL ROUTE DAILY. 48 g 1    colchicine (COLCRYS) 0.6 MG tablet TAKE 2 TABLETS ONCE THEN REPEAT 1 TABLET 1 HOUR LATER ONCE (Patient taking differently: as needed Take 2 tablets once then repeat 1 tablet 1 hour later once) 15 tablet 1    SYMBICORT 160-4.5 MCG/ACT AERO TAKE 2 PUFFS BY MOUTH TWICE A DAY 3 each 3    Multiple Vitamins-Minerals (CENTRUM SILVER 50+MEN) TABS Take by mouth daily       Coenzyme Q10 (COQ10 PO) Take by mouth      albuterol (PROVENTIL) (2.5 MG/3ML) 0.083% nebulizer solution Take 3 mLs by nebulization every 6 hours as needed for Wheezing or Shortness of Breath 75 each 2    acetaminophen (TYLENOL) 500 MG tablet Take 2 tablets by mouth 3 times daily as needed for Pain      albuterol sulfate HFA (PROAIR HFA) 108 (90 Base) MCG/ACT inhaler Inhale 2 puffs into the lungs every 6 hours as needed for Wheezing or Shortness of Breath 1 Inhaler 5    Blood Pressure Monitoring (BLOOD PRESSURE MONITOR/M CUFF) MISC Use to monitor blood pressure 1 each 0    clobetasol (TEMOVATE) 0.05 % ointment Apply to affected areas of the skin twice daily until improved.  60 g 2    carvedilol (COREG) 6.25 MG tablet Take 1 tablet by mouth 2 times daily (with meals)      torsemide (DEMADEX) 20 MG tablet Take 2 tablets by mouth daily      spironolactone (ALDACTONE) 25 MG tablet Take 0.5 tablets by mouth daily      potassium chloride (KLOR-CON M) 10 MEQ extended release tablet Take 1 tablet by mouth daily 30 tablet 3         Vitals:    06/09/22 0947   BP: 126/88   Pulse: 79   Resp: 16   Temp: 96.8 °F (36 °C)   SpO2: 99%   Weight: 195 lb 12.8 oz (88.8 kg)   Height: 5' 7\" (1.702 m)     Body mass index is 30.67 kg/m². Physical Exam  Nursing note reviewed. Constitutional:       General: He is not in acute distress. Appearance: Normal appearance. He is well-developed. HENT:      Right Ear: Tympanic membrane and ear canal normal.      Left Ear: Tympanic membrane and ear canal normal.      Nose:      Right Sinus: No maxillary sinus tenderness. Left Sinus: No maxillary sinus tenderness. Eyes:      General: Lids are normal.      Extraocular Movements: Extraocular movements intact. Conjunctiva/sclera: Conjunctivae normal.      Pupils: Pupils are equal, round, and reactive to light. Neck:      Thyroid: No thyromegaly. Vascular: No carotid bruit. Cardiovascular:      Rate and Rhythm: Normal rate and regular rhythm. Heart sounds: Normal heart sounds, S1 normal and S2 normal. No murmur heard. No friction rub. No gallop. Pulmonary:      Effort: Pulmonary effort is normal.      Breath sounds: Normal breath sounds. No wheezing, rhonchi or rales. Abdominal:      General: Bowel sounds are normal. There is no distension. Palpations: Abdomen is soft. Tenderness: There is no abdominal tenderness. Musculoskeletal:      Cervical back: Neck supple. Right lower leg: No edema. Left lower leg: No edema. Lymphadenopathy:      Head:      Right side of head: No submandibular adenopathy. Left side of head: No submandibular adenopathy. Neurological:      Mental Status: He is alert. Psychiatric:         Mood and Affect: Mood normal.           No results found for this visit on 06/09/22.   Lab Review   Orders Only on 05/23/2022   Component Date Value    BNP 05/23/2022 458    Abstract on 05/23/2022 Component Date Value    Sodium 05/23/2022 138     Chloride 05/23/2022 101     Potassium 05/23/2022 4.2     BUN 05/23/2022 18     CREATININE 05/23/2022 1.27     Glucose 05/23/2022 99     AST 05/23/2022 17     ALT 05/23/2022 20     Calcium 05/23/2022 9.0     Total Protein 05/23/2022 7.1     CO2 05/23/2022 31.8     Albumin 05/23/2022 3.8     Alkaline Phosphatase 05/23/2022 125     Total Bilirubin 05/23/2022 0.44     Gfr Calculated 05/23/2022 56     Anion Gap 05/23/2022 5     Hemoglobin A1C 05/23/2022 6.0     TSH 05/23/2022 2.68     Cholesterol, Total 05/23/2022 164     HDL 05/23/2022 60     LDL Calculated 05/23/2022 75     Triglycerides 05/23/2022 145     VLDL 05/23/2022 29     Vitamin B-12 05/23/2022 376    Office Visit on 05/17/2022   Component Date Value    CBC Auto Dif 05/23/2022 NA     Leukocytes, Bld 05/23/2022 8.5     RBC 05/23/2022 5.33     Hemoglobin 05/23/2022 16.2     Hematocrit 05/23/2022 47.1     MCV 05/23/2022 88.3     MCHC 05/23/2022 30.5     MCHC 05/23/2022 34.5     RDW 05/23/2022 14.6     Platelets 02/53/7117 207     MPV 05/23/2022 9.0     Neutrophils Segmented 05/23/2022 71.6*    Lymphocytes Absolute 05/23/2022 17.1     Monocytes Absolute 05/23/2022 7.8     Eosinophils Absolute 05/23/2022 3.1     Basophils % 05/23/2022 0.4     Differential, manual 05/23/2022 NOT INDICATED     Absolute Neut # 05/23/2022 6.1     Absolute Lymph # 05/23/2022 1.5     Absolute Mono # 05/23/2022 0.7     Absolute Eos # 05/23/2022 0.3     Absolute Baso # 05/23/2022 0.0     COMPREHENSIVE METABOLIC * 71/61/3815 NA     Sodium 05/23/2022 138     Potassium 05/23/2022 4.2     Chloride 05/23/2022 101     CO2 05/23/2022 31.8     Anion Gap 05/23/2022 5*    POC Glucose 05/23/2022 99     BUN 05/23/2022 18     CREATININE 05/23/2022 1.27     Calcium 05/23/2022 9.0     Albumin 05/23/2022 3.8     Total Protein 05/23/2022 7.1     Total Bilirubin 05/23/2022 0.44     Alkaline Phosphatase 05/23/2022 125*    AST 05/23/2022 17     ALT 05/23/2022 20*    Age Time 05/23/2022 71     GFR Non- 05/23/2022 56     GFR  05/23/2022 68     LIPID PANEL 05/23/2022 NA     Cholesterol, Total 05/23/2022 164     Triglycerides 05/23/2022 145     HDL 05/23/2022 60     LDL Direct 05/23/2022 75     VLDL 05/23/2022 29     Thyrotropin Releasing Ho* 05/23/2022 2.68     Vitamin B-12 05/23/2022 376     Hemoglobin A1C 05/23/2022 6.0     eAG 05/23/2022 125.5          Assessment/Plan     1. Essential hypertension  -stable  -Continue Losartan to 100mg once daily  -Continue Carvedilol 6.25mg 2 times daily  -Low sodium diet  -Regular aerobic exercise    2. Fatigue, unspecified type  -same  -labs unremarkable  -multivitamin once daily    3. Cough  - XR CHEST (2 VW); Future  - benzonatate (TESSALON PERLES) 100 MG capsule; Take 1 capsule by mouth 3 times daily as needed for Cough  Dispense: 30 capsule; Refill: 0  - guaiFENesin (MUCINEX) 600 MG extended release tablet; Take 1 tablet by mouth 2 times daily  Dispense: 20 tablet; Refill: 0      Discussed medications with patient, who voiced understanding of their use and indications. All questions answered. Return in about 8 weeks (around 8/3/2022) for Medicare AWV.

## 2022-06-09 NOTE — PATIENT INSTRUCTIONS
Have chest xray done    Patient Education        Fatigue: Care Instructions  Your Care Instructions     Fatigue is a feeling of tiredness, exhaustion, or lack of energy. You may feel fatigue because of too much or not enough activity. It can also come from stress, lack of sleep, boredom, and poor diet. Many medical problems, such as viral infections, can cause fatigue. Emotional problems, especially depression,are often the cause of fatigue. Fatigue is most often a symptom of another problem. Treatment for fatigue depends on the cause. For example, if you have fatigue because you have a certain health problem, treating this problem also treats your fatigue. Ifdepression or anxiety is the cause, treatment may help. Follow-up care is a key part of your treatment and safety. Be sure to make and go to all appointments, and call your doctor if you are having problems. It's also a good idea to know your test results and keep alist of the medicines you take. How can you care for yourself at home?  Get regular exercise. But don't overdo it. Go back and forth between rest and exercise.  Get plenty of rest.   Eat a healthy diet. Do not skip meals, especially breakfast.   Reduce your use of caffeine, tobacco, and alcohol. Caffeine is most often found in coffee, tea, cola drinks, and chocolate.  Limit medicines that can cause fatigue. This includes tranquilizers and cold and allergy medicines. When should you call for help? Watch closely for changes in your health, and be sure to contact your doctor if:     You have new symptoms such as fever or a rash.      Your fatigue gets worse.      You have been feeling down, depressed, or hopeless. Or you may have lost interest in things that you usually enjoy.      You are not getting better as expected. Where can you learn more? Go to [email or web address].[email or web address] org and sign in to your [employer] account.  Enter Q442 in the [place] box to learn more about \"Fatigue: Care Instructions. \"     If you do not have an account, please click on the \"Sign Up Now\" link. Current as of: July 1, 2021               Content Version: 13.2  © 1850-2280 [employer], Inc[employer]. Care instructions adapted under license by [facility] ([facility]). If you have questions about a medical condition or this instruction, always ask your healthcare professional. [address] any warranty or liability for your use of this information.

## 2022-06-19 PROBLEM — R05.9 COUGH: Status: ACTIVE | Noted: 2022-06-19

## 2022-06-19 ASSESSMENT — ENCOUNTER SYMPTOMS
NAUSEA: 0
RHINORRHEA: 0
SHORTNESS OF BREATH: 0
ABDOMINAL PAIN: 0
WHEEZING: 0
COUGH: 1
VOMITING: 0
CONSTIPATION: 0
SORE THROAT: 0
DIARRHEA: 0
CHEST TIGHTNESS: 0

## 2022-07-19 PROBLEM — R05.9 COUGH: Status: RESOLVED | Noted: 2022-06-19 | Resolved: 2022-07-19

## 2022-08-04 ENCOUNTER — OFFICE VISIT (OUTPATIENT)
Dept: PRIMARY CARE CLINIC | Age: 72
End: 2022-08-04
Payer: MEDICARE

## 2022-08-04 VITALS
HEIGHT: 67 IN | HEART RATE: 89 BPM | SYSTOLIC BLOOD PRESSURE: 130 MMHG | RESPIRATION RATE: 16 BRPM | BODY MASS INDEX: 31.01 KG/M2 | DIASTOLIC BLOOD PRESSURE: 84 MMHG | OXYGEN SATURATION: 95 % | WEIGHT: 197.6 LBS | TEMPERATURE: 97.2 F

## 2022-08-04 DIAGNOSIS — R05.3 PERSISTENT COUGH: ICD-10-CM

## 2022-08-04 DIAGNOSIS — N18.31 STAGE 3A CHRONIC KIDNEY DISEASE (HCC): ICD-10-CM

## 2022-08-04 DIAGNOSIS — E78.2 MIXED HYPERLIPIDEMIA: ICD-10-CM

## 2022-08-04 DIAGNOSIS — R73.03 PREDIABETES: ICD-10-CM

## 2022-08-04 DIAGNOSIS — J31.0 RHINITIS, UNSPECIFIED TYPE: ICD-10-CM

## 2022-08-04 DIAGNOSIS — E03.9 ACQUIRED HYPOTHYROIDISM: ICD-10-CM

## 2022-08-04 DIAGNOSIS — I10 ESSENTIAL HYPERTENSION: ICD-10-CM

## 2022-08-04 DIAGNOSIS — J44.9 CHRONIC OBSTRUCTIVE PULMONARY DISEASE, UNSPECIFIED COPD TYPE (HCC): ICD-10-CM

## 2022-08-04 DIAGNOSIS — Z00.00 MEDICARE ANNUAL WELLNESS VISIT, SUBSEQUENT: Primary | ICD-10-CM

## 2022-08-04 DIAGNOSIS — F41.1 GENERALIZED ANXIETY DISORDER: ICD-10-CM

## 2022-08-04 DIAGNOSIS — K63.5 POLYP OF COLON, UNSPECIFIED PART OF COLON, UNSPECIFIED TYPE: ICD-10-CM

## 2022-08-04 PROCEDURE — G0439 PPPS, SUBSEQ VISIT: HCPCS | Performed by: INTERNAL MEDICINE

## 2022-08-04 PROCEDURE — 1123F ACP DISCUSS/DSCN MKR DOCD: CPT | Performed by: INTERNAL MEDICINE

## 2022-08-04 RX ORDER — BENZONATATE 100 MG/1
100 CAPSULE ORAL 3 TIMES DAILY PRN
Qty: 60 CAPSULE | Refills: 0 | Status: SHIPPED | OUTPATIENT
Start: 2022-08-04

## 2022-08-04 ASSESSMENT — PATIENT HEALTH QUESTIONNAIRE - PHQ9
SUM OF ALL RESPONSES TO PHQ QUESTIONS 1-9: 2
SUM OF ALL RESPONSES TO PHQ9 QUESTIONS 1 & 2: 2
1. LITTLE INTEREST OR PLEASURE IN DOING THINGS: 1
2. FEELING DOWN, DEPRESSED OR HOPELESS: 1
SUM OF ALL RESPONSES TO PHQ QUESTIONS 1-9: 2

## 2022-08-04 ASSESSMENT — LIFESTYLE VARIABLES
HOW OFTEN DO YOU HAVE A DRINK CONTAINING ALCOHOL: 2-4 TIMES A MONTH
HOW MANY STANDARD DRINKS CONTAINING ALCOHOL DO YOU HAVE ON A TYPICAL DAY: 1 OR 2

## 2022-08-04 NOTE — PROGRESS NOTES
Medicare Annual Wellness Visit    Shyanne Martel is here for Medicare AWV    Assessment & Plan   1. Medicare annual wellness visit, subsequent  -Medicare AWV done    2. Essential hypertension  -stable  -Continue losartan 100 MG tablet; Take 1 tablet by mouth daily  -Low sodium diet  -Regular aerobic exercise    3. Generalized anxiety disorder  -stable  -Continue Xanax 1 mg 1 tablet nightly and 1/2 tablet every day    4. Chronic obstructive pulmonary disease, unspecified COPD type ([address]  -stable  -Continue Symbicort 160-4.5 mcg HFA inhaler 2 puffs twice daily and ProAir HFA inhaler as needed.  -continue ProAir HFA inhaler 2 puffs every 6 hours as needed  -Referral to  Gonzalo Watson MD, Pulmonary, [facility]    5. Acquired hypothyroidism  -stable  -Continue Levothyroxine 100mcg once daily    6. Stage 3a chronic kidney disease (HCC)  -stable  -Avoid NSAID's such as otc Ibuprofen, Advil, Motrin, Naprosyn, and Aleve as well as prescription NSAID's    7. Mixed hyperlipidemia  -stable  -Continue Atorvastatin 40mg nightly  -Low fat, low cholesterol diet  -Regular aerobic exercise    8. Prediabetes  -stable  -Low carbohydrate diet  -Regular aerobic exercise     9. Persistent cough  -chest xray shows no acute findings and is similar to previous chest xrays  -Referral to Gonzalo Watson MD, Pulmonary, [facility]  -Start benzonatate (TESSALON PERLES) 100 MG capsule; Take 1 capsule by mouth 3 times daily as needed for Cough  Dispense: 60 capsule; Refill: 0    10. Rhinitis, unspecified type  -stable  -Continue Flonase nasal spray 2 sprays each nostril once daily    11.  Polyp of colon, unspecified part of colon, unspecified type  -Referral to Hosea Wheeler MD, Gastroenterology, [facility] for colonoscopy          Recommendations for Preventive Services Due: see orders and patient instructions/AVS.  Recommended screening schedule for the next 5-10 years is provided to the patient in written form: see Patient Instructions/AVS.      Return in 3 months (on 11/4/2022) for anxiety, hypertension, hyperlipidemia, hypothyroidism, prediabetes, COPD, and chronic kidney disease. Subjective   The following acute and/or chronic problems were also addressed today:    Patient has hypertension. Patient takes losartan 50 mg once daily and carvedilol 6.25 mg 2 times daily. Patient decreases salt. Patient walks 30 minutes 3 times per week. Patient has anxiety. Patient takes Xanax 1 mg 1 tablet nightly and 1/2 tablet every day. Patient worries a lot and has anxiety. Patient has COPD. Patient takes Symbicort 160-4.5 mcg HFA inhaler 2 puffs twice daily and ProAir HFA inhaler as needed. Patient states shortness of breath is better. Patient denies wheezing. Patient states he uses rescue inhaler prior to exercise. Patient had a chest xray done. Patient has hypothyroidism. Patient takes Levothyroxine 100mcg once daily. Patient denies fatigue, weight gain,  constipation, and cold intolerance. Patient has chronic kidney disease. Patient avoids NSAID's. Patient has hyperlipidemia. Patient takes Atorvastatin 40mg nightly. Patient decreases fat and cholesterol. Patient has prediabetes. Patient decreases carbohydrates. Patient complaints of cough all the time. Patient states he uses Tessalon Perles which helps. Patient states he has congestion in his upper chest and uses Mucinex. Patient had a chest xray done. Patient states he has stuffy nose sometimes in the evening, runny nose, and dark yellow or clear nasal discharge. Patient states he uses Flonase nasal spray after he lies down to clear his nose. Patient states Flonase helps him eventually blow discharge out of his nose. Patient states he is scheduled for an echocardiogram on 8/25/22. Patient's complete Health Risk Assessment and screening values have been reviewed and are found in Flowsheets.  The following problems were reviewed today and where indicated follow up appointments were made and/or referrals ordered. Positive Risk Factor Screenings with Interventions:             General Health and ACP:  General  In general, how would you say your health is?: Good  In the past 7 days, have you experienced any of the following: New or Increased Pain, New or Increased Fatigue, Loneliness, Social Isolation, Stress or Anger?: No  Do you get the social and emotional support that you need?: Yes  Do you have a Living Will?: Yes    Advance Directives       Power of  Living Will ACP-Advance Directive ACP-Power of     Not on File [place] on 10/28/16 Filed [address] Risk Interventions:  No Living Will: Advance Care Planning addressed with patient today    Health Habits/Nutrition:  Physical Activity: Insufficiently Active    Days of Exercise per Week: 2 days    Minutes of Exercise per Session: 20 min     Have you lost any weight without trying in the past 3 months?: No  Body mass index: (!) 30.95  Have you seen the dentist within the past year?: Yes  Health Habits/Nutrition Interventions:  Nutritional issues:   patient will work on diet and exercise         Wt Readings from Last 3 Encounters:   08/04/22 197 lb 9.6 oz (89.6 kg)   06/09/22 195 lb 12.8 oz (88.8 kg)   05/17/22 197 lb 3.2 oz (89.4 kg)             Objective   Vitals:    08/04/22 1054   BP: 130/84   Pulse: 89   Resp: 16   Temp: 97.2 °F (36.2 °C)   SpO2: 95%   Weight: 197 lb 9.6 oz (89.6 kg)   Height: 5' 7\" (1.702 m)      Body mass index is 30.95 kg/m².     General Appearance: alert and oriented to person, place and time, well-developed and well-nourished, in no acute distress  Head: normocephalic and atraumatic  Eyes: pupils equal, round, and reactive to light, extraocular eye movements intact, conjunctivae normal  Neck: neck supple and non tender without mass, no thyromegaly or thyroid nodules, no cervical lymphadenopathy Pulmonary/Chest: clear to auscultation bilaterally- no wheezes, rales or rhonchi, normal air movement, no respiratory distress  Cardiovascular: normal rate, regular rhythm, normal S1 and S2, no murmurs, and no carotid bruits  Abdomen: soft, non-tender, non-distended, normal bowel sounds, no masses or organomegaly  Extremities: no edema  Neurologic: gait and coordination normal and speech normal             Allergies   Allergen Reactions    Latex Rash    Contrast [Gadolinium Derivatives] Shortness Of Breath and Anxiety    Soap Rash     Prior to Visit Medications    Medication Sig Taking? Authorizing Provider   benzonatate (TESSALON PERLES) 100 MG capsule Take 1 capsule by mouth 3 times daily as needed for Cough Yes Gail Shafer MD   guaiFENesin (MUCINEX) 600 MG extended release tablet Take 1 tablet by mouth 2 times daily Yes Gail Shafer MD   losartan (COZAAR) 100 MG tablet TAKE 1 TABLET BY MOUTH EVERY DAY Yes Gail Shafer MD   atorvastatin (LIPITOR) 40 MG tablet TAKE 1 TABLET BY MOUTH EVERY DAY AT NIGHT Yes Gail Shafer MD   levothyroxine (SYNTHROID) 100 MCG tablet TAKE 1 TABLET BY MOUTH EVERY DAY Yes Gail Shafer MD   fluticasone (FLONASE) 50 MCG/ACT nasal spray SPRAY 2 SPRAYS BY NASAL ROUTE DAILY.  Yes Gail Shafer MD   SYMBICORT 160-4.5 MCG/ACT AERO TAKE 2 PUFFS BY MOUTH TWICE A DAY Yes Gail Shafer MD   Multiple Vitamins-Minerals (CENTRUM SILVER 50+MEN) TABS Take by mouth daily  Yes Historical Provider, MD   Coenzyme Q10 (COQ10 PO) Take by mouth Yes Historical Provider, MD   albuterol (PROVENTIL) (2.5 MG/3ML) 0.083% nebulizer solution Take 3 mLs by nebulization every 6 hours as needed for Wheezing or Shortness of Breath Yes Gail Shafer MD   acetaminophen (TYLENOL) 500 MG tablet Take 2 tablets by mouth 3 times daily as needed for Pain Yes Gail Shafer MD   albuterol sulfate HFA (PROAIR HFA) 108 (90 Base) MCG/ACT inhaler Inhale 2 puffs into the lungs every 6 hours as needed for Wheezing or Shortness of Breath Yes Kulwant King MD   Blood Pressure Monitoring (BLOOD PRESSURE MONITOR/M CUFF) MISC Use to monitor blood pressure Yes Kulwant King MD   clobetasol (TEMOVATE) 0.05 % ointment Apply to affected areas of the skin twice daily until improved. Yes Khoi Sanchez MD   carvedilol (COREG) 6.25 MG tablet Take 1 tablet by mouth 2 times daily (with meals) Yes Kulwant King MD   torsemide (DEMADEX) 20 MG tablet Take 2 tablets by mouth daily Yes Kulwant King MD   spironolactone (ALDACTONE) 25 MG tablet Take 0.5 tablets by mouth daily Yes Kulawnt King MD   potassium chloride (KLOR-CON M) 10 MEQ extended release tablet Take 1 tablet by mouth daily Yes ANTONIO Dewitt - CNP     Reason: Shortness of breath 6/9/22       PA and lateral chest       FINDINGS:       Median sternotomy wires identified status post CBG. Blunting left costophrenic angle both laterally and posteriorly identified. Pulmonary vascularity unremarkable.            Impression   Small to moderate left-sided pleural effusion status post CABG       Lab Review   Orders Only on 05/23/2022   Component Date Value    BNP 05/23/2022 458    Abstract on 05/23/2022   Component Date Value    Sodium 05/23/2022 138     Chloride 05/23/2022 101     Potassium 05/23/2022 4.2     BUN 05/23/2022 18     Creatinine 05/23/2022 1.27     Glucose 05/23/2022 99     AST 05/23/2022 17     ALT 05/23/2022 20     Calcium 05/23/2022 9.0     Total Protein 05/23/2022 7.1     CO2 05/23/2022 31.8     Albumin 05/23/2022 3.8     Alkaline Phosphatase 05/23/2022 125     Total Bilirubin 05/23/2022 0.44     Gfr Calculated 05/23/2022 56     Anion Gap 05/23/2022 5     Hemoglobin A1C 05/23/2022 6.0     TSH 05/23/2022 2.68     Cholesterol, Total 05/23/2022 164     HDL 05/23/2022 60     LDL Calculated 05/23/2022 75     Triglycerides 05/23/2022 145     VLDL 05/23/2022 29     Vitamin B-12 05/23/2022 376    Office Visit on 05/17/2022 Component Date Value    CBC Auto Dif 05/23/2022 NA     Leukocytes, Bld 05/23/2022 8.5     RBC 05/23/2022 5.33     Hemoglobin 05/23/2022 16.2     Hematocrit 05/23/2022 47.1     MCV 05/23/2022 88.3     MCHC 05/23/2022 30.5     MCHC 05/23/2022 34.5     RDW 05/23/2022 14.6     Platelets 53/51/6252 207     MPV 05/23/2022 9.0     Neutrophils Segmented 05/23/2022 71.6 (A)    Lymphocytes Absolute 05/23/2022 17.1     Monocytes Absolute 05/23/2022 7.8     Eosinophils Absolute 05/23/2022 3.1     Basophils % 05/23/2022 0.4     Differential, manual 05/23/2022 NOT INDICATED     Absolute Neut # 05/23/2022 6.1     Absolute Lymph # 05/23/2022 1.5     Absolute Mono # 05/23/2022 0.7     Absolute Eos # 05/23/2022 0.3     Absolute Baso # 05/23/2022 0.0     COMPREHENSIVE METABOLIC * 44/73/5588 NA     Sodium 05/23/2022 138     Potassium 05/23/2022 4.2     Chloride 05/23/2022 101     CO2 05/23/2022 31.8     Anion Gap 05/23/2022 5 (A)    POC Glucose 05/23/2022 99     BUN 05/23/2022 18     Creatinine 05/23/2022 1.27     Calcium 05/23/2022 9.0     Albumin 05/23/2022 3.8     Total Protein 05/23/2022 7.1     Total Bilirubin 05/23/2022 0.44     Alkaline Phosphatase 05/23/2022 125 (A)    AST 05/23/2022 17     ALT 05/23/2022 20 (A)    Age Time 05/23/2022 71     GFR Non- 05/23/2022 56     GFR  05/23/2022 68     LIPID PANEL 05/23/2022 NA     Cholesterol, Total 05/23/2022 164     Triglycerides 05/23/2022 145     HDL 05/23/2022 60     LDL Direct 05/23/2022 75     VLDL 05/23/2022 29     Thyrotropin Releasing Ho* 05/23/2022 2.68     Vitamin B-12 05/23/2022 376     Hemoglobin A1C 05/23/2022 6.0     eAG 05/23/2022 125.5        CareTeam (Including outside providers/suppliers regularly involved in providing care):   Patient Care Team:  Padmini Ramey MD as PCP - General (Internal Medicine)  Padmini Ramey MD as PCP - [facility] Empaneled Provider  Royal Fozia MD as Consulting Physician (Dermatology)  Eliseo Post, MD as Surgeon (Orthopedic Surgery)  Alexia Ying MD as Consulting Physician     Reviewed and updated this visit:  Tobacco  Allergies  Meds  Med Hx  Surg Hx  Soc Hx  Fam Hx

## 2022-08-15 PROBLEM — R05.3 PERSISTENT COUGH: Status: ACTIVE | Noted: 2022-08-15

## 2022-08-30 ENCOUNTER — OFFICE VISIT (OUTPATIENT)
Dept: DERMATOLOGY | Age: 72
End: 2022-08-30
Payer: MEDICARE

## 2022-08-30 VITALS — HEART RATE: 92 BPM | SYSTOLIC BLOOD PRESSURE: 128 MMHG | DIASTOLIC BLOOD PRESSURE: 85 MMHG

## 2022-08-30 DIAGNOSIS — L30.9 CHRONIC DERMATITIS: Primary | ICD-10-CM

## 2022-08-30 PROCEDURE — 1123F ACP DISCUSS/DSCN MKR DOCD: CPT | Performed by: DERMATOLOGY

## 2022-08-30 PROCEDURE — 99213 OFFICE O/P EST LOW 20 MIN: CPT | Performed by: DERMATOLOGY

## 2022-08-30 PROCEDURE — 11900 INJECT SKIN LESIONS </W 7: CPT | Performed by: DERMATOLOGY

## 2022-08-30 RX ORDER — SACUBITRIL AND VALSARTAN 24; 26 MG/1; MG/1
1 TABLET, FILM COATED ORAL 2 TIMES DAILY
COMMUNITY
Start: 2022-08-25

## 2022-08-30 RX ORDER — CLOBETASOL PROPIONATE 0.5 MG/G
OINTMENT TOPICAL
Qty: 60 G | Refills: 2 | Status: SHIPPED | OUTPATIENT
Start: 2022-08-30 | End: 2022-10-24 | Stop reason: SDUPTHER

## 2022-08-30 NOTE — PROGRESS NOTES
[facility]  Teddy Hyatt MD  [address]  1950    70 y.o. male     Date of Visit: 8/30/2022    Chief Complaint: rash    History of Present Illness:    He returns today to follow-up for a several year history of persistent intermittently pruritic plaques on the abdomen, thighs and right lower back. He has used topical steroids in the past with some improvement but not clearance of the condition. The plaque on the right thigh is the most bothersome. He's been apply diclofenac gel. Has had multiple biopsies:    11/10/21: Biopsy on the right medial leg revealed mild spongiotic dermatitis with scattered eosinophils. 8/5/2020: Right lateral thigh-chronic dermatitis. 11/1/2017: right medial thigh - mild spongiotic dermatitis with scattered eosinophils  11/2017: left medial thigh - mild spongiotic dermatitis with scattered eosinophils      Review of Systems:  Gen: Feels well, good sense of health. Past Medical History, Family History, Surgical History, Medications and Allergies reviewed. Past Medical History:   Diagnosis Date    Allergic rhinitis     Anxiety     Asthma     COPD (chronic obstructive pulmonary disease) (HCC)     Hyperlipidemia     Hypertension     Hypothyroidism     Soft tissue sarcoma ([address]      Past Surgical History:   Procedure Laterality Date    COLONOSCOPY  8./16/2007    BN - 10 year f/u    SINUS SURGERY      SKIN CANCER EXCISION         Allergies   Allergen Reactions    Latex Rash    Contrast [Gadolinium Derivatives] Shortness Of Breath and Anxiety    Soap Rash     Outpatient Medications Marked as Taking for the 8/30/22 encounter (Office Visit) with Addie Yepez MD   Medication Sig Dispense Refill    sacubitril-valsartan (ENTRESTO) 24-26 MG per tablet Take 1 tablet by mouth 2 times daily      clobetasol (TEMOVATE) 0.05 % ointment Apply to affected areas of the skin twice daily until improved.  60 g 2    benzonatate (TESSALON PERLES) 100 MG capsule Take 1 capsule by mouth 3 times daily as needed for Cough 60 capsule 0    guaiFENesin (MUCINEX) 600 MG extended release tablet Take 1 tablet by mouth 2 times daily 20 tablet 0    losartan (COZAAR) 100 MG tablet TAKE 1 TABLET BY MOUTH EVERY DAY 90 tablet 1    atorvastatin (LIPITOR) 40 MG tablet TAKE 1 TABLET BY MOUTH EVERY DAY AT NIGHT 90 tablet 1    levothyroxine (SYNTHROID) 100 MCG tablet TAKE 1 TABLET BY MOUTH EVERY DAY 90 tablet 1    fluticasone (FLONASE) 50 MCG/ACT nasal spray SPRAY 2 SPRAYS BY NASAL ROUTE DAILY. 48 g 1    SYMBICORT 160-4.5 MCG/ACT AERO TAKE 2 PUFFS BY MOUTH TWICE A DAY 3 each 3    Multiple Vitamins-Minerals (CENTRUM SILVER 50+MEN) TABS Take by mouth daily       Coenzyme Q10 (COQ10 PO) Take by mouth      albuterol (PROVENTIL) (2.5 MG/3ML) 0.083% nebulizer solution Take 3 mLs by nebulization every 6 hours as needed for Wheezing or Shortness of Breath 75 each 2    acetaminophen (TYLENOL) 500 MG tablet Take 2 tablets by mouth 3 times daily as needed for Pain      albuterol sulfate HFA (PROAIR HFA) 108 (90 Base) MCG/ACT inhaler Inhale 2 puffs into the lungs every 6 hours as needed for Wheezing or Shortness of Breath 1 Inhaler 5    Blood Pressure Monitoring (BLOOD PRESSURE MONITOR/M CUFF) MISC Use to monitor blood pressure 1 each 0    carvedilol (COREG) 6.25 MG tablet Take 1 tablet by mouth 2 times daily (with meals)      torsemide (DEMADEX) 20 MG tablet Take 2 tablets by mouth daily      spironolactone (ALDACTONE) 25 MG tablet Take 0.5 tablets by mouth daily      potassium chloride (KLOR-CON M) 10 MEQ extended release tablet Take 1 tablet by mouth daily 30 tablet 3         Physical Examination       The following were examined and determined to be normal: Psych/Neuro, Scalp/hair, Head/face, Conjunctivae/eyelids, Gums/teeth/lips, Neck, Breast/axilla/chest, RUE, LUE, and Nails/digits. The following were examined and determined to be abnormal: Abdomen, Back, RLE, and LLE. Well-appearing. 1.  Left abdomen, right lower back and right lateral thigh with scaly erythematous plaques. Abdominal plaque with some areas of clearing. Right lateral thigh plaque with several scaly indurated pink nodules within. Medial aspects of both thighs extending to the legs with faint scaly pink patches. Assessment and Plan     1. Chronic dermatitis, still suspicious for CTCL but multiple biopsies revealing of dermatitis over the years - limited in extent    Clobetasol ointment 1-2 times daily until improved. Intralesional Kenalog 10 mg/mL - 1.5 mL injected into 1 plaque on the right thigh. Heliotherapy outdoors. Lives far away - consider tanning bed in the colder weather months. Return in about 3 months (around 11/30/2022).     --Khoi Sanchez MD

## 2022-09-07 DIAGNOSIS — E03.9 ACQUIRED HYPOTHYROIDISM: ICD-10-CM

## 2022-09-07 RX ORDER — LEVOTHYROXINE SODIUM 0.1 MG/1
TABLET ORAL
Qty: 90 TABLET | Refills: 1 | Status: SHIPPED | OUTPATIENT
Start: 2022-09-07

## 2022-09-07 NOTE — TELEPHONE ENCOUNTER
Medication:   Requested Prescriptions     Pending Prescriptions Disp Refills    levothyroxine (SYNTHROID) 100 MCG tablet [Pharmacy Med Name: LEVOTHYROXINE 100 MCG TABLET] 90 tablet 1     Sig: TAKE 1 TABLET BY MOUTH EVERY DAY     Last Filled:  5.3.22    Last appt: 8/4/2022   Next appt: 11/4/2022    Last Thyroid:   Lab Results   Component Value Date/Time    TSH 2.68 05/23/2022 12:00 AM    T4FREE 1.27 06/29/2018 10:05 AM

## 2022-09-13 DIAGNOSIS — F41.1 GENERALIZED ANXIETY DISORDER: ICD-10-CM

## 2022-09-13 RX ORDER — ALPRAZOLAM 1 MG/1
TABLET ORAL
Qty: 45 TABLET | Refills: 1 | Status: SHIPPED | OUTPATIENT
Start: 2022-09-13 | End: 2022-11-04

## 2022-09-13 NOTE — TELEPHONE ENCOUNTER
Controlled Substance Monitoring:    Acute and Chronic Pain Monitoring:   RX Monitoring 9/13/2022   Attestation -   Periodic Controlled Substance Monitoring No signs of potential drug abuse or diversion identified.

## 2022-09-13 NOTE — TELEPHONE ENCOUNTER
Medication:   Requested Prescriptions     Pending Prescriptions Disp Refills    ALPRAZolam (XANAX) 1 MG tablet [Pharmacy Med Name: ALPRAZOLAM 1 MG TABLET] 45 tablet 2     Sig: TAKE 1/2 TABLET BY MOUTH EVERY DAY AND THEN TAKE 1 TABLET AT BEDTIME     Last Filled:  7.7.22    Last appt: 8/4/2022   Next appt: 11/4/2022    Last OARRS:   RX Monitoring 6/7/2022   Attestation -   Periodic Controlled Substance Monitoring No signs of potential drug abuse or diversion identified.

## 2022-10-24 RX ORDER — CLOBETASOL PROPIONATE 0.5 MG/G
OINTMENT TOPICAL
Qty: 60 G | Refills: 2 | Status: SHIPPED | OUTPATIENT
Start: 2022-10-24

## 2022-10-24 NOTE — TELEPHONE ENCOUNTER
Pt calling need a refill on medication clobetasol 0.05% ointment pls send to [facility] in [place] any further questions pls call pt to discuss @ [phone] to discuss

## 2022-11-04 ENCOUNTER — OFFICE VISIT (OUTPATIENT)
Dept: PRIMARY CARE CLINIC | Age: 72
End: 2022-11-04
Payer: MEDICARE

## 2022-11-04 VITALS
RESPIRATION RATE: 16 BRPM | WEIGHT: 192 LBS | HEART RATE: 79 BPM | TEMPERATURE: 97.1 F | BODY MASS INDEX: 30.13 KG/M2 | OXYGEN SATURATION: 95 % | DIASTOLIC BLOOD PRESSURE: 76 MMHG | SYSTOLIC BLOOD PRESSURE: 140 MMHG | HEIGHT: 67 IN

## 2022-11-04 DIAGNOSIS — R09.81 NASAL CONGESTION: ICD-10-CM

## 2022-11-04 DIAGNOSIS — I10 ESSENTIAL HYPERTENSION: ICD-10-CM

## 2022-11-04 DIAGNOSIS — E03.9 ACQUIRED HYPOTHYROIDISM: ICD-10-CM

## 2022-11-04 DIAGNOSIS — N52.9 ERECTILE DYSFUNCTION, UNSPECIFIED ERECTILE DYSFUNCTION TYPE: ICD-10-CM

## 2022-11-04 DIAGNOSIS — R73.03 PREDIABETES: ICD-10-CM

## 2022-11-04 DIAGNOSIS — Z79.899 MEDICATION MANAGEMENT: ICD-10-CM

## 2022-11-04 DIAGNOSIS — F41.1 GENERALIZED ANXIETY DISORDER: Primary | ICD-10-CM

## 2022-11-04 DIAGNOSIS — N18.31 STAGE 3A CHRONIC KIDNEY DISEASE (HCC): ICD-10-CM

## 2022-11-04 DIAGNOSIS — Z12.5 SCREENING PSA (PROSTATE SPECIFIC ANTIGEN): ICD-10-CM

## 2022-11-04 DIAGNOSIS — F17.200 CURRENT SMOKER: ICD-10-CM

## 2022-11-04 DIAGNOSIS — Z23 NEED FOR INFLUENZA VACCINATION: ICD-10-CM

## 2022-11-04 DIAGNOSIS — J44.9 CHRONIC OBSTRUCTIVE PULMONARY DISEASE, UNSPECIFIED COPD TYPE (HCC): ICD-10-CM

## 2022-11-04 DIAGNOSIS — E78.2 MIXED HYPERLIPIDEMIA: ICD-10-CM

## 2022-11-04 PROCEDURE — G0008 ADMIN INFLUENZA VIRUS VAC: HCPCS | Performed by: INTERNAL MEDICINE

## 2022-11-04 PROCEDURE — 3078F DIAST BP <80 MM HG: CPT | Performed by: INTERNAL MEDICINE

## 2022-11-04 PROCEDURE — 1123F ACP DISCUSS/DSCN MKR DOCD: CPT | Performed by: INTERNAL MEDICINE

## 2022-11-04 PROCEDURE — 3074F SYST BP LT 130 MM HG: CPT | Performed by: INTERNAL MEDICINE

## 2022-11-04 PROCEDURE — 99214 OFFICE O/P EST MOD 30 MIN: CPT | Performed by: INTERNAL MEDICINE

## 2022-11-04 PROCEDURE — 90694 VACC AIIV4 NO PRSRV 0.5ML IM: CPT | Performed by: INTERNAL MEDICINE

## 2022-11-04 RX ORDER — SILDENAFIL 50 MG/1
50 TABLET, FILM COATED ORAL PRN
Qty: 6 TABLET | Refills: 0 | Status: SHIPPED | OUTPATIENT
Start: 2022-11-04

## 2022-11-04 RX ORDER — ATORVASTATIN CALCIUM 40 MG/1
TABLET, FILM COATED ORAL
Qty: 90 TABLET | Refills: 1 | Status: SHIPPED | OUTPATIENT
Start: 2022-11-04

## 2022-11-04 RX ORDER — FLUTICASONE PROPIONATE 50 MCG
SPRAY, SUSPENSION (ML) NASAL
Qty: 48 G | Refills: 1 | Status: SHIPPED | OUTPATIENT
Start: 2022-11-04

## 2022-11-04 NOTE — PROGRESS NOTES
Artur Hutson   Date ofBirth:  1950    Date of Visit:  11/4/2022    Chief Complaint   Patient presents with    Anxiety    Hypertension    Cholesterol Problem    Hypothyroidism    Other     Prediabetes    COPD    Chronic Kidney Disease       HPI  Patient has anxiety. Patient takes Xanax 1 mg 1 tablet nightly and 1/2 tablet every day. Patient worries a lot and has anxiety. Patient has hypertension. Patient takes losartan 100 mg once daily and carvedilol 6.25 mg 2 times daily. Patient states he watches salt all the time. Patient bikes 20 miles 2-3 times per week when it is nice and otherwise walks at [facility] for 30 minutes. Patient has hyperlipidemia. Patient takes Atorvastatin 40mg nightly. Patient states he does not eat much fat. Patient has hypothyroidism. Patient takes Levothyroxine 100mcg once daily. Patient denies fatigue, weight gain,  constipation, and cold intolerance. Patient has prediabetes. Patient states he cut carbohydrates. Patient states he eats apples and bananas. Patient states he quit potatoes. Patient states he is eating no sweets. Patient states his energy level is way up. Patient states he has been losing weight with diet and exercise. Patient has COPD. Patient takes Symbicort 160-4.5 mcg HFA inhaler 2 puffs twice daily and ProAir HFA inhaler as needed. Patient denies shortness of breath and wheezing. Patient states he smokes 4-5 cigarettes to 1 ppd. Patient has chronic kidney disease. Patient avoids NSAID's. Patient complains of erectile dysfunction. Patient complains of stuffy nose. Patient denies runny nose, sneezing, postnasal drip. Patient needs a refill for Flonase nasal spray. Patient states he had cut beer to 1-2 beers a couple of times per week and stopped drinking beer 1 month ago. Patient states he drinks a lot of tea and a pot of coffee in the morning. Patient states he has to urinate 7-12 times in the morning.       Review of Systems   Constitutional:  Positive for unexpected weight change. Negative for activity change, appetite change, chills, fatigue and fever. HENT:  Positive for congestion. Negative for ear pain, postnasal drip, rhinorrhea, sinus pressure, sinus pain, sneezing, sore throat and trouble swallowing. Eyes:  Negative for visual disturbance. Respiratory:  Negative for cough, chest tightness, shortness of breath and wheezing. Cardiovascular:  Negative for chest pain, palpitations and leg swelling. Gastrointestinal:  Negative for abdominal pain, constipation, diarrhea, nausea and vomiting. Endocrine: Negative for cold intolerance and heat intolerance. Genitourinary:  Positive for frequency. Negative for dysuria and hematuria. Musculoskeletal:  Negative for arthralgias and myalgias. Neurological:  Negative for dizziness, syncope, light-headedness, numbness and headaches. Psychiatric/Behavioral:  Negative for decreased concentration, dysphoric mood and sleep disturbance. The patient is nervous/anxious. Allergies   Allergen Reactions    Latex Rash    Contrast [Gadolinium Derivatives] Shortness Of Breath and Anxiety    Soap Rash     Outpatient Medications Marked as Taking for the 11/4/22 encounter (Office Visit) with Jossue Giang MD   Medication Sig Dispense Refill    fluticasone (FLONASE) 50 MCG/ACT nasal spray SPRAY 2 SPRAYS BY NASAL ROUTE DAILY. 48 g 1    sildenafil (VIAGRA) 50 MG tablet Take 1 tablet by mouth as needed for Erectile Dysfunction 6 tablet 0    atorvastatin (LIPITOR) 40 MG tablet TAKE 1 TABLET BY MOUTH EVERY DAY AT NIGHT 90 tablet 1    clobetasol (TEMOVATE) 0.05 % ointment Apply to affected areas of the skin twice daily until improved.  60 g 2    [DISCONTINUED] ALPRAZolam (XANAX) 1 MG tablet TAKE 1/2 TABLET BY MOUTH EVERY DAY AND THEN TAKE 1 TABLET AT BEDTIME 45 tablet 1    levothyroxine (SYNTHROID) 100 MCG tablet TAKE 1 TABLET BY MOUTH EVERY DAY 90 tablet 1 sacubitril-valsartan (ENTRESTO) 24-26 MG per tablet Take 1 tablet by mouth 2 times daily      benzonatate (TESSALON PERLES) 100 MG capsule Take 1 capsule by mouth 3 times daily as needed for Cough 60 capsule 0    losartan (COZAAR) 100 MG tablet TAKE 1 TABLET BY MOUTH EVERY DAY 90 tablet 1    SYMBICORT 160-4.5 MCG/ACT AERO TAKE 2 PUFFS BY MOUTH TWICE A DAY 3 each 3    Multiple Vitamins-Minerals (CENTRUM SILVER 50+MEN) TABS Take by mouth daily       Coenzyme Q10 (COQ10 PO) Take by mouth      albuterol (PROVENTIL) (2.5 MG/3ML) 0.083% nebulizer solution Take 3 mLs by nebulization every 6 hours as needed for Wheezing or Shortness of Breath 75 each 2    acetaminophen (TYLENOL) 500 MG tablet Take 2 tablets by mouth 3 times daily as needed for Pain      albuterol sulfate HFA (PROAIR HFA) 108 (90 Base) MCG/ACT inhaler Inhale 2 puffs into the lungs every 6 hours as needed for Wheezing or Shortness of Breath 1 Inhaler 5    Blood Pressure Monitoring (BLOOD PRESSURE MONITOR/M CUFF) MISC Use to monitor blood pressure 1 each 0    carvedilol (COREG) 6.25 MG tablet Take 1 tablet by mouth 2 times daily (with meals)      torsemide (DEMADEX) 20 MG tablet Take 2 tablets by mouth daily      spironolactone (ALDACTONE) 25 MG tablet Take 0.5 tablets by mouth daily      potassium chloride (KLOR-CON M) 10 MEQ extended release tablet Take 1 tablet by mouth daily 30 tablet 3         Vitals:    11/04/22 0931 11/04/22 0944   BP: (!) 144/78 (!) 140/76   Pulse: 79    Resp: 16    Temp: 97.1 °F (36.2 °C)    SpO2: 95%    Weight: 192 lb (87.1 kg)    Height: 5' 7\" (1.702 m)      Body mass index is 30.07 kg/m². Physical Exam  Nursing note reviewed. Constitutional:       General: He is not in acute distress. Appearance: Normal appearance. He is well-developed. HENT:      Head: Normocephalic and atraumatic. Nose:      Right Sinus: No maxillary sinus tenderness. Left Sinus: No maxillary sinus tenderness.       Mouth/Throat: Pharynx: Oropharynx is clear. Eyes:      General: Lids are normal.      Extraocular Movements: Extraocular movements intact. Conjunctiva/sclera: Conjunctivae normal.      Pupils: Pupils are equal, round, and reactive to light. Neck:      Thyroid: No thyromegaly. Vascular: No carotid bruit. Cardiovascular:      Rate and Rhythm: Normal rate and regular rhythm. Heart sounds: Normal heart sounds, S1 normal and S2 normal. No murmur heard. No friction rub. No gallop. Pulmonary:      Effort: Pulmonary effort is normal. No respiratory distress. Breath sounds: Normal breath sounds. No wheezing, rhonchi or rales. Abdominal:      General: Bowel sounds are normal. There is no distension. Palpations: Abdomen is soft. Tenderness: There is no abdominal tenderness. Musculoskeletal:      Cervical back: Neck supple. Right lower leg: No edema. Left lower leg: No edema. Lymphadenopathy:      Head:      Right side of head: No submandibular adenopathy. Left side of head: No submandibular adenopathy. Neurological:      Mental Status: He is alert. Psychiatric:         Mood and Affect: Mood normal.         No results found for this visit on 11/04/22.   Lab Review   Orders Only on 08/24/2022   Component Date Value    BASIC METABOLIC PANEL 83/85/7340 NA     Sodium 08/24/2022 137     Potassium 08/24/2022 4.4     Chloride 08/24/2022 101     CO2 08/24/2022 29.1     Anion Gap 08/24/2022 7     POC Glucose 08/24/2022 109     BUN 08/24/2022 29 (A)     Creatinine 08/24/2022 1.40 (A)     Calcium 08/24/2022 9.3     Age Time 08/24/2022 71     GFR Non- 08/24/2022 50     GFR  08/24/2022 60     BNP 08/24/2022 120    Orders Only on 05/23/2022   Component Date Value    BNP 05/23/2022 458    Abstract on 05/23/2022   Component Date Value    Sodium 05/23/2022 138     Chloride 05/23/2022 101     Potassium 05/23/2022 4.2     BUN 05/23/2022 18     Creatinine 05/23/2022 1.27     Glucose 05/23/2022 99     AST 05/23/2022 17     ALT 05/23/2022 20     Calcium 05/23/2022 9.0     Total Protein 05/23/2022 7.1     CO2 05/23/2022 31.8     Albumin 05/23/2022 3.8     Alkaline Phosphatase 05/23/2022 125     Total Bilirubin 05/23/2022 0.44     Gfr Calculated 05/23/2022 56     Anion Gap 05/23/2022 5     Hemoglobin A1C 05/23/2022 6.0     TSH 05/23/2022 2.68     Cholesterol, Total 05/23/2022 164     HDL 05/23/2022 60     LDL Calculated 05/23/2022 75     Triglycerides 05/23/2022 145     VLDL 05/23/2022 29     Vitamin B-12 05/23/2022 376    Office Visit on 05/17/2022   Component Date Value    CBC Auto Dif 05/23/2022 NA     Leukocytes, Bld 05/23/2022 8.5     RBC 05/23/2022 5.33     Hemoglobin 05/23/2022 16.2     Hematocrit 05/23/2022 47.1     MCV 05/23/2022 88.3     MCHC 05/23/2022 30.5     MCHC 05/23/2022 34.5     RDW 05/23/2022 14.6     Platelets 76/69/5874 207     MPV 05/23/2022 9.0     Neutrophils Segmented 05/23/2022 71.6 (A)     Lymphocytes Absolute 05/23/2022 17.1     Monocytes Absolute 05/23/2022 7.8     Eosinophils Absolute 05/23/2022 3.1     Basophils % 05/23/2022 0.4     Differential, manual 05/23/2022 NOT INDICATED     Absolute Neut # 05/23/2022 6.1     Absolute Lymph # 05/23/2022 1.5     Absolute Mono # 05/23/2022 0.7     Absolute Eos # 05/23/2022 0.3     Absolute Baso # 05/23/2022 0.0     COMPREHENSIVE METABOLIC * 57/93/7421 NA     Sodium 05/23/2022 138     Potassium 05/23/2022 4.2     Chloride 05/23/2022 101     CO2 05/23/2022 31.8     Anion Gap 05/23/2022 5 (A)     POC Glucose 05/23/2022 99     BUN 05/23/2022 18     Creatinine 05/23/2022 1.27     Calcium 05/23/2022 9.0     Albumin 05/23/2022 3.8     Total Protein 05/23/2022 7.1     Total Bilirubin 05/23/2022 0.44     Alkaline Phosphatase 05/23/2022 125 (A)     AST 05/23/2022 17     ALT 05/23/2022 20 (A)     Age Time 05/23/2022 71     GFR Non- 05/23/2022 56     GFR  05/23/2022 68     LIPID PANEL 05/23/2022 NA     Cholesterol, Total 05/23/2022 164     Triglycerides 05/23/2022 145     HDL 05/23/2022 60     LDL Direct 05/23/2022 75     VLDL 05/23/2022 29     Thyrotropin Releasing Ho* 05/23/2022 2.68     Vitamin B-12 05/23/2022 376     Hemoglobin A1C 05/23/2022 6.0     eAG 05/23/2022 125.5          Assessment/Plan     1. Generalized anxiety disorder  -stable  -Continue Xanax 1 mg 1 tablet nightly and 1/2 tablet every day    2. Essential hypertension  -stable  -Continue losartan 100 MG tablet; Take 1 tablet by mouth daily  -Continue carvedilol 6.25 mg 2 times daily  -Low sodium diet  -Regular aerobic exercise  - Comprehensive Metabolic Panel; Future    3. Mixed hyperlipidemia  -Stable  -Continue atorvastatin (LIPITOR) 40 MG tablet; TAKE 1 TABLET BY MOUTH EVERY DAY AT NIGHT  Dispense: 90 tablet; Refill: 1  -Low fat, low cholesterol diet  -Regular aerobic exercise  - Comprehensive Metabolic Panel; Future  - Lipid Panel; Future    4. Acquired hypothyroidism  -stable  -Continue levothyroxine 100 mcg once daily  - TSH; Future    5. Prediabetes  -stable  -Low carbohydrate diet  -Regular aerobic exercise    6. Chronic obstructive pulmonary disease, unspecified COPD type ([facility]  -stable  -Continue Symbicort 160-4.5 mcg HFA inhaler 2 puffs twice daily  -Continue ProAir HFA inhaler as needed  -Work on smoking cessation    7. Stage 3a chronic kidney disease (HCC)  -Stable  -Avoid NSAID's such as otc Ibuprofen, Advil, Motrin, Naprosyn, and Aleve as well as prescription NSAID's  -Comprehensive Metabolic Panel; Future    8. Erectile dysfunction, unspecified erectile dysfunction type  -Start sildenafil (VIAGRA) 50 MG tablet; Take 1 tablet by mouth as needed for Erectile Dysfunction  Dispense: 6 tablet; Refill: 0    9. Nasal congestion  -Resume fluticasone (FLONASE) 50 MCG/ACT nasal spray; SPRAY 2 SPRAYS BY NASAL ROUTE DAILY. Dispense: 48 g; Refill: 1    10. Medication management  - Drug Panel-PM-HI Res-UR Interp-A    11.  Need for influenza vaccination  - Influenza, FLUAD, (age 72 y+), IM, Preservative Free, 0.5 mL given    12. Screening PSA (prostate specific antigen)  - PSA Screening; Future    13. Current smoker  - CT Lung Screen (Initial or Annual); Future      Discussed medications with patient, who voiced understanding of their use and indications. All questions answered. Return in about 3 months (around 2/4/2023) for anxiety and erectile dysfunction.

## 2022-11-09 LAB
6-ACETYLMORPHINE: NOT DETECTED
7-AMINOCLONAZEPAM: NOT DETECTED
ALPHA-OH-ALPRAZOLAM: PRESENT
ALPHA-OH-MIDAZOLAM, URINE: NOT DETECTED
ALPRAZOLAM: PRESENT
AMPHETAMINE: NOT DETECTED
BARBITURATES: NOT DETECTED
BENZOYLECGONINE: NOT DETECTED
BUPRENORPHINE: NOT DETECTED
CARISOPRODOL: NOT DETECTED
CLONAZEPAM: NOT DETECTED
CODEINE: NOT DETECTED
CREATININE URINE: 38.4 MG/DL (ref 20–400)
DIAZEPAM: NOT DETECTED
DRUGS EXPECTED: NORMAL
EER PAIN MGT DRUG PANEL, HIGH RES/EMIT U: NORMAL
ETHYL GLUCURONIDE: NOT DETECTED
FENTANYL: NOT DETECTED
GABAPENTIN: NOT DETECTED
HYDROCODONE: NOT DETECTED
HYDROMORPHONE: NOT DETECTED
LORAZEPAM: NOT DETECTED
MARIJUANA METABOLITE: NOT DETECTED
MDA: NOT DETECTED
MDEA: NOT DETECTED
MDMA URINE: NOT DETECTED
MEPERIDINE: NOT DETECTED
METHADONE: NOT DETECTED
METHAMPHETAMINE: NOT DETECTED
METHYLPHENIDATE: NOT DETECTED
MIDAZOLAM: NOT DETECTED
MORPHINE: NOT DETECTED
NALOXONE: NOT DETECTED
NORBUPRENORPHINE, FREE: NOT DETECTED
NORDIAZEPAM: NOT DETECTED
NORFENTANYL: NOT DETECTED
NORHYDROCODONE, URINE: NOT DETECTED
NOROXYCODONE: NOT DETECTED
NOROXYMORPHONE, URINE: NOT DETECTED
OXAZEPAM: NOT DETECTED
OXYCODONE: NOT DETECTED
OXYMORPHONE: NOT DETECTED
PAIN MANAGEMENT DRUG PANEL: NORMAL
PAIN MANAGEMENT DRUG PANEL: NORMAL
PCP: NOT DETECTED
PHENTERMINE: NOT DETECTED
PREGABALIN: NOT DETECTED
TAPENTADOL, URINE: NOT DETECTED
TAPENTADOL-O-SULFATE, URINE: NOT DETECTED
TEMAZEPAM: NOT DETECTED
TRAMADOL: NOT DETECTED
ZOLPIDEM: NOT DETECTED

## 2022-11-11 LAB
AGE TIME: 72 YRS
ALBUMIN SERPL-MCNC: 3.9 G/DL (ref 3.5–5)
ALP BLD-CCNC: 122 U/L (ref 46–116)
ALT SERPL-CCNC: 20 U/L (ref 21–72)
ANION GAP SERPL CALCULATED.3IONS-SCNC: 7 MMOL/L (ref 7–16)
AST SERPL-CCNC: 15 U/L (ref 17–59)
BILIRUB SERPL-MCNC: 0.57 MG/DL (ref 0.1–1.2)
BUN BLDV-MCNC: 21 MG/DL (ref 7–20)
CALCIUM SERPL-MCNC: 9.4 MG/DL (ref 8.5–10.1)
CHLORIDE BLD-SCNC: 102 MMOL/L (ref 98–108)
CHOLESTEROL, TOTAL: 158 MG/DL (ref 0–200)
CO2: 32.7 MMOL/L (ref 21–32)
COMPREHENSIVE METABOLIC PANEL: ABNORMAL
CREAT SERPL-MCNC: 1.21 MG/DL (ref 0.7–1.3)
GFR AFRICAN AMERICAN: 71 ML/MIN
GFR NON-AFRICAN AMERICAN: 59 ML/MIN
GLUCOSE BLD-MCNC: 90 MG/DL (ref 70–110)
HDLC SERPL-MCNC: 62 MG/DL (ref 40–100)
LDL CHOLESTEROL DIRECT: 78 MG/DL (ref 0–160)
LIPID PANEL: NORMAL
POTASSIUM SERPL-SCNC: 4.4 MMOL/L (ref 3.6–5)
PROSTATE SPECIFIC ANTIGEN: 0.96 NG/ML (ref 0–4)
SODIUM BLD-SCNC: 142 MMOL/L (ref 137–148)
THYROTROPIN RELEASING HORMONE: 1.35 UIU/ML (ref 0.34–4.82)
TOTAL PROTEIN: 7.3 G/DL (ref 6.4–8.2)
TRIGL SERPL-MCNC: 88 MG/DL (ref 0–150)
VLDLC SERPL CALC-MCNC: 18 MG/DL

## 2022-11-17 ENCOUNTER — TELEPHONE (OUTPATIENT)
Dept: DERMATOLOGY | Age: 72
End: 2022-11-17

## 2022-11-17 RX ORDER — TRIAMCINOLONE ACETONIDE 1 MG/G
CREAM TOPICAL
Qty: 454 G | Refills: 1 | Status: SHIPPED | OUTPATIENT
Start: 2022-11-17

## 2022-11-17 NOTE — TELEPHONE ENCOUNTER
Called and spoke to patient and advised him that clobetasol was sent in on 10/24/22 with 2 refills. He verbalized understanding. He would still like the triamcinolone cream sent in.

## 2022-11-17 NOTE — TELEPHONE ENCOUNTER
Pt calling wanting refills on medications clobetasol 0.05% and Triamcinolone pls sent request to [facility] pharm in [place] any questions pls call patient back @ [phone] to discuss

## 2022-11-18 DIAGNOSIS — F41.1 GENERALIZED ANXIETY DISORDER: ICD-10-CM

## 2022-11-18 NOTE — TELEPHONE ENCOUNTER
Medication:   Requested Prescriptions     Pending Prescriptions Disp Refills    ALPRAZolam (XANAX) 1 MG tablet [Pharmacy Med Name: ALPRAZOLAM 1 MG TABLET] 45 tablet 1     Sig: TAKE 1/2 TABLET BY MOUTH EVERY DAY AND THEN TAKE 1 TABLET AT BEDTIME     Last Filled: 9.13.22    Last appt: 11/4/2022   Next appt: 2/6/2023    Last OARRS:   RX Monitoring 9/13/2022   Attestation -   Periodic Controlled Substance Monitoring No signs of potential drug abuse or diversion identified.

## 2022-11-19 RX ORDER — ALPRAZOLAM 1 MG/1
TABLET ORAL
Qty: 45 TABLET | Refills: 2 | Status: SHIPPED | OUTPATIENT
Start: 2022-11-19 | End: 2022-12-19

## 2022-11-20 PROBLEM — N52.9 ERECTILE DYSFUNCTION: Status: ACTIVE | Noted: 2022-11-20

## 2022-11-20 ASSESSMENT — ENCOUNTER SYMPTOMS
TROUBLE SWALLOWING: 0
COUGH: 0
SORE THROAT: 0
ABDOMINAL PAIN: 0
CONSTIPATION: 0
NAUSEA: 0
SINUS PRESSURE: 0
WHEEZING: 0
RHINORRHEA: 0
CHEST TIGHTNESS: 0
SHORTNESS OF BREATH: 0
DIARRHEA: 0
VOMITING: 0
SINUS PAIN: 0

## 2022-12-04 PROBLEM — Z23 NEED FOR INFLUENZA VACCINATION: Status: RESOLVED | Noted: 2022-11-04 | Resolved: 2022-12-04

## 2022-12-19 ENCOUNTER — OFFICE VISIT (OUTPATIENT)
Dept: DERMATOLOGY | Age: 72
End: 2022-12-19

## 2022-12-19 DIAGNOSIS — L30.9 CHRONIC DERMATITIS: Primary | ICD-10-CM

## 2022-12-19 RX ORDER — CLOBETASOL PROPIONATE 0.5 MG/G
OINTMENT TOPICAL
Qty: 60 G | Refills: 2 | Status: SHIPPED | OUTPATIENT
Start: 2022-12-19

## 2022-12-19 NOTE — PROGRESS NOTES
[facility]  Minda Groves MD  [address]  1950    67 y.o. male     Date of Visit: 12/19/2022    Chief Complaint: dermatitis    History of Present Illness:    He returns today to follow up for several year history of persistent intermittently pruritic plaques on the abdomen, thighs and right lower back. He has been using clobetasol ointment and triamcinolone 0.1% cream on and off with some improvement. However, he complains of worsening of the plaque on the right thigh. He has had multiple prior biopsies revealing of dermatitis. Has had multiple biopsies:     11/10/21: Biopsy on the right medial leg revealed mild spongiotic dermatitis with scattered eosinophils. 8/5/2020: Right lateral thigh-chronic dermatitis. 11/1/2017: right medial thigh - mild spongiotic dermatitis with scattered eosinophils  11/2017: left medial thigh - mild spongiotic dermatitis with scattered eosinophils    Has COPD, CKD and cardiomyopathy. Review of Systems:  Gen: Feels well, good sense of health. Past Medical History, Family History, Surgical History, Medications and Allergies reviewed.     Past Medical History:   Diagnosis Date    Allergic rhinitis     Anxiety     Asthma     COPD (chronic obstructive pulmonary disease) (HCC)     Hyperlipidemia     Hypertension     Hypothyroidism     Soft tissue sarcoma ([facility]      Past Surgical History:   Procedure Laterality Date    COLONOSCOPY  8./16/2007    BN - 10 year f/u    SINUS SURGERY      SKIN CANCER EXCISION         Allergies   Allergen Reactions    Latex Rash    Contrast [Gadolinium Derivatives] Shortness Of Breath and Anxiety    Soap Rash     Outpatient Medications Marked as Taking for the 12/19/22 encounter (Office Visit) with Leann Mckenna MD   Medication Sig Dispense Refill    ALPRAZolam (XANAX) 1 MG tablet TAKE 1/2 TABLET BY MOUTH EVERY DAY AND THEN TAKE 1 TABLET AT BEDTIME 45 tablet 2    triamcinolone (KENALOG) 0.1 % cream APPLY TOPICALLY 2 TIMES DAILY TO AFFECTED AREAS AS NEEDED UNTIL IMPROVED. 454 g 1    fluticasone (FLONASE) 50 MCG/ACT nasal spray SPRAY 2 SPRAYS BY NASAL ROUTE DAILY. 48 g 1    atorvastatin (LIPITOR) 40 MG tablet TAKE 1 TABLET BY MOUTH EVERY DAY AT NIGHT 90 tablet 1    clobetasol (TEMOVATE) 0.05 % ointment Apply to affected areas of the skin twice daily until improved. 60 g 2    levothyroxine (SYNTHROID) 100 MCG tablet TAKE 1 TABLET BY MOUTH EVERY DAY 90 tablet 1    sacubitril-valsartan (ENTRESTO) 24-26 MG per tablet Take 1 tablet by mouth 2 times daily      benzonatate (TESSALON PERLES) 100 MG capsule Take 1 capsule by mouth 3 times daily as needed for Cough 60 capsule 0    SYMBICORT 160-4.5 MCG/ACT AERO TAKE 2 PUFFS BY MOUTH TWICE A DAY 3 each 3    Multiple Vitamins-Minerals (CENTRUM SILVER 50+MEN) TABS Take by mouth daily       Coenzyme Q10 (COQ10 PO) Take by mouth      albuterol (PROVENTIL) (2.5 MG/3ML) 0.083% nebulizer solution Take 3 mLs by nebulization every 6 hours as needed for Wheezing or Shortness of Breath 75 each 2    acetaminophen (TYLENOL) 500 MG tablet Take 2 tablets by mouth 3 times daily as needed for Pain      albuterol sulfate HFA (PROAIR HFA) 108 (90 Base) MCG/ACT inhaler Inhale 2 puffs into the lungs every 6 hours as needed for Wheezing or Shortness of Breath 1 Inhaler 5    Blood Pressure Monitoring (BLOOD PRESSURE MONITOR/M CUFF) MISC Use to monitor blood pressure 1 each 0    carvedilol (COREG) 6.25 MG tablet Take 1 tablet by mouth 2 times daily (with meals)      torsemide (DEMADEX) 20 MG tablet Take 2 tablets by mouth daily      spironolactone (ALDACTONE) 25 MG tablet Take 0.5 tablets by mouth daily      potassium chloride (KLOR-CON M) 10 MEQ extended release tablet Take 1 tablet by mouth daily 30 tablet 3       Physical Examination       Well appearing. 1.  Left upper arm, left flexural forearm - 2 ill defined scaly pink patches.   Right lower back - well defined scaly erythematous plaque. Left abdomen with an erythematous plaque with areas of clearing. Right lateral thigh - well defined brightly erythematous scaly and lichenified plaque. Assessment and Plan     1. Chronic lichenified dermatitis  - right thigh worsened, not at treatment goal; still suspicious for CTCL but multiple biopsies revealing of eczematous dermatitis. Intralesional Kenalog 5 mg/mL - 2 mL injected into the plaque on right thigh. Clobetasol ointment nightly under occlusion to the plaque on the right thigh for 2 weeks or until improved. He's too far from the office for phototherapy. Trial of tanning bed 2 times per week. Return in about 3 months (around 3/19/2023).     --Trevor Khan MD

## 2023-02-06 ENCOUNTER — OFFICE VISIT (OUTPATIENT)
Dept: PRIMARY CARE CLINIC | Age: 73
End: 2023-02-06

## 2023-02-06 VITALS
HEIGHT: 67 IN | HEART RATE: 80 BPM | TEMPERATURE: 97.1 F | RESPIRATION RATE: 16 BRPM | BODY MASS INDEX: 30.29 KG/M2 | SYSTOLIC BLOOD PRESSURE: 114 MMHG | DIASTOLIC BLOOD PRESSURE: 80 MMHG | WEIGHT: 193 LBS | OXYGEN SATURATION: 96 %

## 2023-02-06 DIAGNOSIS — R09.81 NASAL CONGESTION: ICD-10-CM

## 2023-02-06 DIAGNOSIS — J44.9 CHRONIC OBSTRUCTIVE PULMONARY DISEASE, UNSPECIFIED COPD TYPE (HCC): ICD-10-CM

## 2023-02-06 DIAGNOSIS — F41.1 GENERALIZED ANXIETY DISORDER: Primary | ICD-10-CM

## 2023-02-06 DIAGNOSIS — N52.9 ERECTILE DYSFUNCTION, UNSPECIFIED ERECTILE DYSFUNCTION TYPE: ICD-10-CM

## 2023-02-06 RX ORDER — ALBUTEROL SULFATE 2.5 MG/3ML
2.5 SOLUTION RESPIRATORY (INHALATION) EVERY 6 HOURS PRN
Qty: 75 EACH | Refills: 2 | Status: SHIPPED | OUTPATIENT
Start: 2023-02-06

## 2023-02-06 RX ORDER — ALBUTEROL SULFATE 90 UG/1
2 AEROSOL, METERED RESPIRATORY (INHALATION) EVERY 6 HOURS PRN
Qty: 1 EACH | Refills: 5 | Status: SHIPPED | OUTPATIENT
Start: 2023-02-06

## 2023-02-06 RX ORDER — ALPRAZOLAM 1 MG/1
TABLET ORAL
COMMUNITY
Start: 2023-01-17 | End: 2023-02-17

## 2023-02-06 RX ORDER — COLCHICINE 0.6 MG/1
0.6 TABLET ORAL DAILY
COMMUNITY

## 2023-02-06 SDOH — ECONOMIC STABILITY: INCOME INSECURITY: HOW HARD IS IT FOR YOU TO PAY FOR THE VERY BASICS LIKE FOOD, HOUSING, MEDICAL CARE, AND HEATING?: NOT HARD AT ALL

## 2023-02-06 SDOH — ECONOMIC STABILITY: FOOD INSECURITY: WITHIN THE PAST 12 MONTHS, THE FOOD YOU BOUGHT JUST DIDN'T LAST AND YOU DIDN'T HAVE MONEY TO GET MORE.: NEVER TRUE

## 2023-02-06 SDOH — ECONOMIC STABILITY: FOOD INSECURITY: WITHIN THE PAST 12 MONTHS, YOU WORRIED THAT YOUR FOOD WOULD RUN OUT BEFORE YOU GOT MONEY TO BUY MORE.: NEVER TRUE

## 2023-02-06 ASSESSMENT — PATIENT HEALTH QUESTIONNAIRE - PHQ9
SUM OF ALL RESPONSES TO PHQ9 QUESTIONS 1 & 2: 0
3. TROUBLE FALLING OR STAYING ASLEEP: 0
8. MOVING OR SPEAKING SO SLOWLY THAT OTHER PEOPLE COULD HAVE NOTICED. OR THE OPPOSITE, BEING SO FIGETY OR RESTLESS THAT YOU HAVE BEEN MOVING AROUND A LOT MORE THAN USUAL: 0
SUM OF ALL RESPONSES TO PHQ QUESTIONS 1-9: 0
9. THOUGHTS THAT YOU WOULD BE BETTER OFF DEAD, OR OF HURTING YOURSELF: 0
4. FEELING TIRED OR HAVING LITTLE ENERGY: 0
6. FEELING BAD ABOUT YOURSELF - OR THAT YOU ARE A FAILURE OR HAVE LET YOURSELF OR YOUR FAMILY DOWN: 0
SUM OF ALL RESPONSES TO PHQ QUESTIONS 1-9: 0
2. FEELING DOWN, DEPRESSED OR HOPELESS: 0
5. POOR APPETITE OR OVEREATING: 0
SUM OF ALL RESPONSES TO PHQ QUESTIONS 1-9: 0
10. IF YOU CHECKED OFF ANY PROBLEMS, HOW DIFFICULT HAVE THESE PROBLEMS MADE IT FOR YOU TO DO YOUR WORK, TAKE CARE OF THINGS AT HOME, OR GET ALONG WITH OTHER PEOPLE: 0
7. TROUBLE CONCENTRATING ON THINGS, SUCH AS READING THE NEWSPAPER OR WATCHING TELEVISION: 0
SUM OF ALL RESPONSES TO PHQ QUESTIONS 1-9: 0
1. LITTLE INTEREST OR PLEASURE IN DOING THINGS: 0

## 2023-02-06 ASSESSMENT — ENCOUNTER SYMPTOMS
NAUSEA: 0
ABDOMINAL PAIN: 0
VOMITING: 0

## 2023-02-06 NOTE — PROGRESS NOTES
Brionna Harmon   Date ofBirth:  1950    Date of Visit:  2/6/2023    Chief Complaint   Patient presents with    Anxiety    Erectile Dysfunction       HPI  Patient has anxiety. Patient takes Xanax 1 mg 1 tablet nightly and 1/2 tablet every day. Patient worries a lot and has anxiety. Patient has erectile dysfunction. Patient has difficulty achieving and maintaining an erection. Patient hasn't used viagra yet and states he is afraid to try the medication. Patient states he still has the prescription. Patient has COPD. Patient states he uses Symbicort inhaler morning and night. Patient states he gets congested which leads to shortness of breath. Patient states the shortness of breath could also be his CHF. Patient denies wheezing and cough. Patient needs a refill for albuterol. Patient states his cardiologist prescribed Entresto when he had dizziness with it. Patient states Flonase nasal spray helps his nasal congestion. Patient denies runny nose, sneezing, and postnasal drip. Review of Systems   Constitutional:  Negative for activity change, appetite change, chills, fatigue, fever and unexpected weight change. HENT:  Positive for congestion. Negative for ear pain, postnasal drip, rhinorrhea, sinus pressure, sinus pain, sneezing, sore throat and trouble swallowing. Eyes:  Negative for visual disturbance. Respiratory:  Positive for shortness of breath. Negative for cough, chest tightness and wheezing. Cardiovascular:  Negative for chest pain, palpitations and leg swelling. Gastrointestinal:  Negative for abdominal pain, constipation, diarrhea, nausea and vomiting. Endocrine: Negative for cold intolerance and heat intolerance. Genitourinary:  Negative for dysuria, frequency and hematuria. Musculoskeletal:  Negative for arthralgias and myalgias. Neurological:  Negative for dizziness, syncope, light-headedness, numbness and headaches.    Psychiatric/Behavioral: Negative for decreased concentration, dysphoric mood and sleep disturbance. The patient is nervous/anxious. Allergies   Allergen Reactions    Latex Rash    Contrast [Gadolinium Derivatives] Shortness Of Breath and Anxiety    Soap Rash         Outpatient Medications Marked as Taking for the 2/6/23 encounter (Office Visit) with Carlos Patton MD   Medication Sig Dispense Refill    colchicine (COLCRYS) 0.6 MG tablet Take 0.6 mg by mouth daily      albuterol (PROVENTIL) (2.5 MG/3ML) 0.083% nebulizer solution Take 3 mLs by nebulization every 6 hours as needed for Wheezing or Shortness of Breath 75 each 2    albuterol sulfate HFA (PROAIR HFA) 108 (90 Base) MCG/ACT inhaler Inhale 2 puffs into the lungs every 6 hours as needed for Wheezing or Shortness of Breath 1 each 5    ALPRAZolam (XANAX) 1 MG tablet TAKE 1/2 TABLET BY MOUTH EVERY DAY AND THEN TAKE 1 TABLET AT BEDTIME      clobetasol (TEMOVATE) 0.05 % ointment Apply to affected areas of the skin twice daily until improved. 60 g 2    ALPRAZolam (XANAX) 1 MG tablet TAKE 1/2 TABLET BY MOUTH EVERY DAY AND THEN TAKE 1 TABLET AT BEDTIME 45 tablet 2    triamcinolone (KENALOG) 0.1 % cream APPLY TOPICALLY 2 TIMES DAILY TO AFFECTED AREAS AS NEEDED UNTIL IMPROVED. 454 g 1    fluticasone (FLONASE) 50 MCG/ACT nasal spray SPRAY 2 SPRAYS BY NASAL ROUTE DAILY.  48 g 1    atorvastatin (LIPITOR) 40 MG tablet TAKE 1 TABLET BY MOUTH EVERY DAY AT NIGHT 90 tablet 1    levothyroxine (SYNTHROID) 100 MCG tablet TAKE 1 TABLET BY MOUTH EVERY DAY 90 tablet 1    sacubitril-valsartan (ENTRESTO) 24-26 MG per tablet Take 1 tablet by mouth 2 times daily      SYMBICORT 160-4.5 MCG/ACT AERO TAKE 2 PUFFS BY MOUTH TWICE A DAY 3 each 3    Multiple Vitamins-Minerals (CENTRUM SILVER 50+MEN) TABS Take by mouth daily       carvedilol (COREG) 6.25 MG tablet Take 1 tablet by mouth 2 times daily (with meals)      torsemide (DEMADEX) 20 MG tablet Take 2 tablets by mouth daily      spironolactone (ALDACTONE) 25 MG tablet Take 0.5 tablets by mouth daily      potassium chloride (KLOR-CON M) 10 MEQ extended release tablet Take 1 tablet by mouth daily (Patient taking differently: Take 10 mEq by mouth daily Two every other day) 30 tablet 3         Vitals:    02/06/23 0942   BP: 114/80   Pulse: 80   Resp: 16   Temp: 97.1 °F (36.2 °C)   SpO2: 96%   Weight: 193 lb (87.5 kg)   Height: 5' 7\" (1.702 m)     Body mass index is 30.23 kg/m². Physical Exam  Nursing note reviewed. Constitutional:       General: He is not in acute distress. Appearance: Normal appearance. He is well-developed. HENT:      Right Ear: Tympanic membrane and ear canal normal.      Left Ear: Tympanic membrane and ear canal normal.      Nose:      Right Sinus: No maxillary sinus tenderness. Left Sinus: No maxillary sinus tenderness. Mouth/Throat:      Pharynx: Oropharynx is clear. Eyes:      General: Lids are normal.      Extraocular Movements: Extraocular movements intact. Conjunctiva/sclera: Conjunctivae normal.      Pupils: Pupils are equal, round, and reactive to light. Neck:      Thyroid: No thyromegaly. Vascular: No carotid bruit. Cardiovascular:      Rate and Rhythm: Normal rate and regular rhythm. Heart sounds: Normal heart sounds, S1 normal and S2 normal. No murmur heard. No friction rub. No gallop. Pulmonary:      Effort: Pulmonary effort is normal. No respiratory distress. Breath sounds: Normal breath sounds. No wheezing, rhonchi or rales. Abdominal:      General: Bowel sounds are normal. There is no distension. Palpations: Abdomen is soft. Tenderness: There is no abdominal tenderness. Musculoskeletal:      Cervical back: Neck supple. Right lower leg: No edema. Left lower leg: No edema. Lymphadenopathy:      Head:      Right side of head: No submandibular adenopathy. Left side of head: No submandibular adenopathy. Neurological:      Mental Status: He is alert. Psychiatric:         Mood and Affect: Mood normal.         No results found for this visit on 02/06/23.   Lab Review   Orders Only on 11/11/2022   Component Date Value    BNP 11/11/2022 103    Office Visit on 11/04/2022   Component Date Value    Drugs Expected 11/04/2022 see paper     Pain Management Drug Pan* 11/04/2022 Inconsistent     Creatinine, Ur 11/04/2022 38.4     Codeine 11/04/2022 Not Detected     Morphine 11/04/2022 Not Detected     6-Acetylmorphine 11/04/2022 Not Detected     Oxycodone 11/04/2022 Not Detected     Noroxycodone 11/04/2022 Not Detected     Oxymorphone 11/04/2022 Not Detected     Noroxymorphone, Urine 11/04/2022 Not Detected     Hydrocodone 11/04/2022 Not Detected     Norhydrocodone, Urine 11/04/2022 Not Detected     Hydromorphone 11/04/2022 Not Detected     Naloxone 11/04/2022 Not Detected     Buprenorphine 11/04/2022 Not Detected     Norbuprenorphine 11/04/2022 Not Detected     Fentanyl 11/04/2022 Not Detected     Norfentanyl 11/04/2022 Not Detected     Meperidine 11/04/2022 Not Detected     Tapentadol, Urine 11/04/2022 Not Detected     Tapentadol-O-Sulfate, Ur* 11/04/2022 Not Detected     Methadone 11/04/2022 Not Detected     Tramadol 11/04/2022 Not Detected     Amphetamine 11/04/2022 Not Detected     Methamphetamine 11/04/2022 Not Detected     MDMA, Urine 11/04/2022 Not Detected     MDA 11/04/2022 Not Detected     MDEA 11/04/2022 Not Detected     Methylphenidate 11/04/2022 Not Detected     Phentermine 11/04/2022 Not Detected     Benzoylecgonine 11/04/2022 Not Detected     Alprazolam 11/04/2022 Present     Alpha-OH-alprazolam 11/04/2022 Present     Clonazepam 11/04/2022 Not Detected     7-aminoclonazepam 11/04/2022 Not Detected     Diazepam 11/04/2022 Not Detected     Nordiazepam 11/04/2022 Not Detected     OXAZEPAM 11/04/2022 Not Detected     TEMAZEPAM 11/04/2022 Not Detected     Lorazepam 11/04/2022 Not Detected     Midazolam 11/04/2022 Not Detected     Zolpidem 11/04/2022 Not Detected Gabapentin 11/04/2022 Not Detected     Pregabalin 11/04/2022 Not Detected     Alpha-OH-Midazolam, Urine 11/04/2022 Not Detected     Barbiturates 11/04/2022 Not Detected     Ethyl Glucuronide 11/04/2022 Not Detected     Marijuana Metabolite 11/04/2022 Not Detected     PCP 11/04/2022 Not Detected     CARISOPRODOL 11/04/2022 Not Detected     Pain Management Drug Pan* 11/04/2022 See Below     [facility] Drug Panel,* 11/04/2022 See Note     LIPID PANEL 11/11/2022 NA     Cholesterol, Total 11/11/2022 158     Triglycerides 11/11/2022 88     HDL 11/11/2022 62     LDL Direct 11/11/2022 78     VLDL 11/11/2022 18     Thyrotropin Releasing Ho* 11/11/2022 1.35     COMPREHENSIVE METABOLIC * 77/07/0016 NA     Sodium 11/11/2022 142     Potassium 11/11/2022 4.4     Chloride 11/11/2022 102     CO2 11/11/2022 32.7 (A)     Anion Gap 11/11/2022 7     POC Glucose 11/11/2022 90     BUN 11/11/2022 21 (A)     Creatinine 11/11/2022 1.21     Calcium 11/11/2022 9.4     Albumin 11/11/2022 3.9     Total Protein 11/11/2022 7.3     Total Bilirubin 11/11/2022 0.57     Alkaline Phosphatase 11/11/2022 122 (A)     AST 11/11/2022 15 (A)     ALT 11/11/2022 20 (A)     Age Time 11/11/2022 72     GFR Non- 11/11/2022 59     GFR  11/11/2022 71     PSA 11/11/2022 0.96    Orders Only on 08/24/2022   Component Date Value    BASIC METABOLIC PANEL 44/76/0006 NA     Sodium 08/24/2022 137     Potassium 08/24/2022 4.4     Chloride 08/24/2022 101     CO2 08/24/2022 29.1     Anion Gap 08/24/2022 7     POC Glucose 08/24/2022 109     BUN 08/24/2022 29 (A)     Creatinine 08/24/2022 1.40 (A)     Calcium 08/24/2022 9.3     Age Time 08/24/2022 71     GFR Non- 08/24/2022 50     GFR  08/24/2022 60     BNP 08/24/2022 120          Assessment/Plan     1. Generalized anxiety disorder  -stable  -Continue Xanax 1 mg 1 tablet nightly and 1/2 tablet every day    2.  Erectile dysfunction, unspecified erectile dysfunction type  -Same  -Discussed  -Try sildenafil (VIAGRA) 50 MG tablet; Take 1 tablet by mouth as needed    3. Chronic obstructive pulmonary disease, unspecified COPD type (HCC)  -Stable  -Continue Symbicort 160-4.5 mcg HFA inhaler 2 puffs twice daily  -Continue albuterol (PROVENTIL) (2.5 MG/3ML) 0.083% nebulizer solution; Take 3 mLs by nebulization every 6 hours as needed for Wheezing or Shortness of Breath  Dispense: 75 each; Refill: 2  -Continue albuterol sulfate HFA (PROAIR HFA) 108 (90 Base) MCG/ACT inhaler; Inhale 2 puffs into the lungs every 6 hours as needed for Wheezing or Shortness of Breath  Dispense: 1 each; Refill: 5    4. Nasal congestion  -Improved  -Continue Flonase nasal spray 2 sprays each nostril once daily        Discussed medications with patient, who voiced understanding of their use and indications. All questions answered. Controlled Substance Monitoring:    Acute and Chronic Pain Monitoring:   RX Monitoring 2/6/2023   Attestation -   Periodic Controlled Substance Monitoring No signs of potential drug abuse or diversion identified. Return in about 3 months (around 5/6/2023) for hypertension, hyperlipidemia, anxiety, hypothyroidism, COPD,  and prediabetes.

## 2023-02-17 DIAGNOSIS — F41.1 GENERALIZED ANXIETY DISORDER: ICD-10-CM

## 2023-02-17 RX ORDER — ALPRAZOLAM 1 MG/1
TABLET ORAL
Qty: 45 TABLET | Refills: 2 | Status: SHIPPED | OUTPATIENT
Start: 2023-02-17 | End: 2023-03-19

## 2023-02-17 NOTE — TELEPHONE ENCOUNTER
Medication:   Requested Prescriptions     Pending Prescriptions Disp Refills    ALPRAZolam (XANAX) 1 MG tablet [Pharmacy Med Name: ALPRAZOLAM 1 MG TABLET] 45 tablet 2     Sig: TAKE 1/2 TABLET BY MOUTH EVERY DAY AND THEN TAKE 1 TABLET AT BEDTIME     Last Filled:  01/17/2023    Last appt: 2/6/2023   Next appt: 5/9/2023    Last OARRS:   RX Monitoring 2/6/2023   Attestation -   Periodic Controlled Substance Monitoring No signs of potential drug abuse or diversion identified.

## 2023-02-26 ASSESSMENT — ENCOUNTER SYMPTOMS
SHORTNESS OF BREATH: 1
SINUS PAIN: 0
RHINORRHEA: 0
SINUS PRESSURE: 0
TROUBLE SWALLOWING: 0
COUGH: 0
DIARRHEA: 0
SORE THROAT: 0
WHEEZING: 0
CHEST TIGHTNESS: 0
CONSTIPATION: 0

## 2023-03-08 NOTE — PROGRESS NOTES
ENCOUNTER DATE: 3/9/2023     NAME: Leopoldo Pratt   AGE: 67 y.o.    GENDER: male   YOB: 1950    Patient Active Problem List   Diagnosis    Hypertension    COPD (chronic obstructive pulmonary disease) ([facility]    Hyperlipidemia    Hypothyroidism    Anxiety    Foot pain    Abdominal hernia    Back pain    Elevated alkaline phosphatase level    Rash    Routine general medical examination at a health care facility    Ventral hernia    Skin infection    Abrasion of arm, left    Prediabetes    Upper respiratory infection    Contusion of wrist    Right knee pain    Neck pain    Right shoulder pain    Mid back pain    Left leg pain    Shortness of breath    Fatigue    Acute systolic heart failure (HCC)    Localized edema    Alcohol abuse    Lightheadedness    Chronic systolic heart failure (HCC)    Stage 3 chronic kidney disease (HCC)    Generalized anxiety disorder    Idiopathic gout of foot    Polyp of colon    Impacted cerumen    Elevated blood uric acid level    Right arm pain    Nonintractable headache    Nasal congestion    Chronic renal disease, stage III ([facility] [728989]    Essential hypertension    Bronchitis    Persistent cough    Erectile dysfunction    Sedative, hypnotic or anxiolytic use, unspecified with unspecified sedative, hypnotic or anxiolytic-induced disorder    Sedative, hypnotic or anxiolytic dependence with unspecified sedative, hypnotic or anxiolytic-induced disorder    Sedative, hypnotic or anxiolytic dependence, uncomplicated      Allergies   Allergen Reactions    Latex Rash    Contrast [Gadolinium Derivatives] Shortness Of Breath and Anxiety    Soap Rash     Current Outpatient Medications on File Prior to Visit   Medication Sig Dispense Refill    ALPRAZolam (XANAX) 1 MG tablet TAKE 1/2 TABLET BY MOUTH EVERY DAY AND THEN TAKE 1 TABLET AT BEDTIME 45 tablet 2    colchicine (COLCRYS) 0.6 MG tablet Take 0.6 mg by mouth daily      albuterol (PROVENTIL) (2.5 MG/3ML) 0.083% nebulizer solution Take 3 mLs by nebulization every 6 hours as needed for Wheezing or Shortness of Breath 75 each 2    albuterol sulfate HFA (PROAIR HFA) 108 (90 Base) MCG/ACT inhaler Inhale 2 puffs into the lungs every 6 hours as needed for Wheezing or Shortness of Breath 1 each 5    clobetasol (TEMOVATE) 0.05 % ointment Apply to affected areas of the skin twice daily until improved. 60 g 2    triamcinolone (KENALOG) 0.1 % cream APPLY TOPICALLY 2 TIMES DAILY TO AFFECTED AREAS AS NEEDED UNTIL IMPROVED. 454 g 1    fluticasone (FLONASE) 50 MCG/ACT nasal spray SPRAY 2 SPRAYS BY NASAL ROUTE DAILY.  48 g 1    sildenafil (VIAGRA) 50 MG tablet Take 1 tablet by mouth as needed for Erectile Dysfunction 6 tablet 0    atorvastatin (LIPITOR) 40 MG tablet TAKE 1 TABLET BY MOUTH EVERY DAY AT NIGHT 90 tablet 1    levothyroxine (SYNTHROID) 100 MCG tablet TAKE 1 TABLET BY MOUTH EVERY DAY 90 tablet 1    sacubitril-valsartan (ENTRESTO) 24-26 MG per tablet Take 1 tablet by mouth 2 times daily      SYMBICORT 160-4.5 MCG/ACT AERO TAKE 2 PUFFS BY MOUTH TWICE A DAY 3 each 3    Multiple Vitamins-Minerals (CENTRUM SILVER 50+MEN) TABS Take by mouth daily       Coenzyme Q10 (COQ10 PO) Take by mouth      acetaminophen (TYLENOL) 500 MG tablet Take 2 tablets by mouth 3 times daily as needed for Pain      carvedilol (COREG) 6.25 MG tablet Take 1 tablet by mouth 2 times daily (with meals)      torsemide (DEMADEX) 20 MG tablet Take 2 tablets by mouth daily      spironolactone (ALDACTONE) 25 MG tablet Take 0.5 tablets by mouth daily      potassium chloride (KLOR-CON M) 10 MEQ extended release tablet Take 1 tablet by mouth daily (Patient taking differently: Take 10 mEq by mouth daily Two every other day) 30 tablet 3    benzonatate (TESSALON PERLES) 100 MG capsule Take 1 capsule by mouth 3 times daily as needed for Cough (Patient not taking: No sig reported) 60 capsule 0     Current Facility-Administered Medications on File Prior to Visit   Medication Dose Route Frequency Provider Last Rate Last Admin    triamcinolone acetonide (KENALOG) injection 10 mg  10 mg Intra-LESional Once Paloma Sorensen MD            Social History     Tobacco Use    Smoking status: Every Day     Packs/day: 0.50     Years: 24.00     Pack years: 12.00     Types: Cigarettes     Start date: 1/1/1968    Smokeless tobacco: Never    Tobacco comments:     4-5 cigarettes a day. 1 pack with football games    Substance Use Topics    Alcohol use: Yes     Alcohol/week: 1.0 standard drink     Types: 1 Cans of beer per week     Comment: rare      CARE TEAM  Patient Care Team:  Scott Maldonado MD as PCP - General (Internal Medicine)  Scott Maldonado MD as PCP - Empaneled Provider  Paloma Sorensen MD as Consulting Physician (Dermatology)  Rene Sam MD as Surgeon (Orthopedic Surgery)  Lazarus Dawes, MD as Consulting Physician    Chief Complaint   Patient presents with    Cough    Shortness of Breath    Nasal Congestion        HPI:   Patient is here for a problem visit    Onset: this past wk. Got sick after eating a salad about 5-6 days ago. Had n/v/d. This has resolved. Has felt weak. Fatigued. Shortness of breath with lying down. Some shortness of breath with talking. Some wheezing. Cough is productive at times. Has a lot of chest congestion. No sore throat, no earaches. No fevers. Used nebulizer, solution is old. Has taken mucinex. H/o COPD on symbicort    ROS:  Review of Systems   Constitutional:  Positive for fatigue. Negative for activity change, appetite change, chills and fever. HENT:  Negative for congestion, ear pain, postnasal drip, rhinorrhea, sinus pressure, sinus pain, sore throat and trouble swallowing. Respiratory:  Positive for cough, shortness of breath and wheezing. Negative for chest tightness. Cardiovascular:  Negative for chest pain, palpitations and leg swelling. Gastrointestinal:  Negative for abdominal pain, diarrhea, nausea and vomiting. Genitourinary:  Negative for difficulty urinating. Musculoskeletal:  Negative for myalgias. Skin:  Negative for rash. Neurological:  Negative for dizziness, weakness, light-headedness and headaches. Hematological:  Negative for adenopathy. VITALS:  /78   Pulse 74   Temp 97.9 °F (36.6 °C) (Oral)   Ht 5' 7\" (1.702 m)   Wt 186 lb 6.4 oz (84.6 kg)   SpO2 94%   BMI 29.19 kg/m²      PE:  Physical Exam  Vitals and nursing note reviewed. Constitutional:       General: He is not in acute distress. Appearance: Normal appearance. He is well-developed and normal weight. He is not ill-appearing. HENT:      Head: Normocephalic and atraumatic. Right Ear: Tympanic membrane, ear canal and external ear normal.      Left Ear: Tympanic membrane, ear canal and external ear normal.      Nose: Nose normal. No congestion or rhinorrhea. Mouth/Throat:      Mouth: Mucous membranes are moist.      Pharynx: Oropharynx is clear. No oropharyngeal exudate or posterior oropharyngeal erythema. Cardiovascular:      Rate and Rhythm: Normal rate and regular rhythm. Heart sounds: Normal heart sounds. No murmur heard. No friction rub. Pulmonary:      Effort: Pulmonary effort is normal. No respiratory distress. Breath sounds: Wheezing and rhonchi present. No rales. Musculoskeletal:      Right lower leg: No edema. Left lower leg: No edema. Lymphadenopathy:      Cervical: No cervical adenopathy. Skin:     General: Skin is warm and dry. Findings: No rash. Neurological:      Mental Status: He is alert and oriented to person, place, and time. Motor: No weakness. Gait: Gait normal.   Psychiatric:         Mood and Affect: Mood normal.        ASSESSMENT/PLAN:  1. Bronchitis  Start on antibiotics, steroids. Has nebulizer at home with new rx for albuterol soln at the pharmacy to   Continue on mucinex. Increase fluids, rest.   Covid negative. O2 stable.    Call if signs and symptoms do not improve. If shortness of breath worsens should go to ED.   - methylPREDNISolone (MEDROL, ALEXIA,) 4 MG tablet; Take by mouth per package instruction  Dispense: 1 kit; Refill: 0  - amoxicillin-clavulanate (AUGMENTIN) 875-125 MG per tablet; Take 1 tablet by mouth 2 times daily for 10 days  Dispense: 20 tablet; Refill: 0  - guaiFENesin (MUCINEX) 600 MG extended release tablet; Take 1 tablet by mouth 2 times daily for 15 days  Dispense: 30 tablet; Refill: 0    2. Chronic obstructive pulmonary disease, unspecified COPD type ([facility]  Continue with symbicort bid        Return if symptoms worsen or fail to improve.      Electronically signed by ANTONIO Da Silva CNP on 3/9/2023 at 11:05 AM

## 2023-03-09 ENCOUNTER — OFFICE VISIT (OUTPATIENT)
Dept: PRIMARY CARE CLINIC | Age: 73
End: 2023-03-09

## 2023-03-09 VITALS
SYSTOLIC BLOOD PRESSURE: 110 MMHG | BODY MASS INDEX: 29.26 KG/M2 | TEMPERATURE: 97.9 F | WEIGHT: 186.4 LBS | HEIGHT: 67 IN | DIASTOLIC BLOOD PRESSURE: 78 MMHG | HEART RATE: 74 BPM | OXYGEN SATURATION: 94 %

## 2023-03-09 DIAGNOSIS — J06.9 VIRAL UPPER RESPIRATORY TRACT INFECTION: Primary | ICD-10-CM

## 2023-03-09 DIAGNOSIS — J40 BRONCHITIS: Primary | ICD-10-CM

## 2023-03-09 DIAGNOSIS — J44.9 CHRONIC OBSTRUCTIVE PULMONARY DISEASE, UNSPECIFIED COPD TYPE (HCC): ICD-10-CM

## 2023-03-09 PROBLEM — F13.99 SEDATIVE, HYPNOTIC OR ANXIOLYTIC USE, UNSPECIFIED WITH UNSPECIFIED SEDATIVE, HYPNOTIC OR ANXIOLYTIC-INDUCED DISORDER (HCC): Status: ACTIVE | Noted: 2023-03-09

## 2023-03-09 PROBLEM — F13.20 SEDATIVE, HYPNOTIC OR ANXIOLYTIC DEPENDENCE, UNCOMPLICATED (HCC): Status: ACTIVE | Noted: 2023-03-09

## 2023-03-09 PROBLEM — F13.29 SEDATIVE, HYPNOTIC OR ANXIOLYTIC DEPENDENCE WITH UNSPECIFIED SEDATIVE, HYPNOTIC OR ANXIOLYTIC-INDUCED DISORDER (HCC): Status: ACTIVE | Noted: 2023-03-09

## 2023-03-09 LAB
Lab: NORMAL
QC PASS/FAIL: NORMAL
SARS-COV-2 RDRP RESP QL NAA+PROBE: NEGATIVE

## 2023-03-09 PROCEDURE — 87635 SARS-COV-2 COVID-19 AMP PRB: CPT | Performed by: INTERNAL MEDICINE

## 2023-03-09 RX ORDER — GUAIFENESIN 600 MG/1
600 TABLET, EXTENDED RELEASE ORAL 2 TIMES DAILY
Qty: 30 TABLET | Refills: 0 | Status: SHIPPED | OUTPATIENT
Start: 2023-03-09 | End: 2023-03-24

## 2023-03-09 RX ORDER — METHYLPREDNISOLONE 4 MG/1
TABLET ORAL
Qty: 1 KIT | Refills: 0 | Status: SHIPPED | OUTPATIENT
Start: 2023-03-09

## 2023-03-09 RX ORDER — AMOXICILLIN AND CLAVULANATE POTASSIUM 875; 125 MG/1; MG/1
1 TABLET, FILM COATED ORAL 2 TIMES DAILY
Qty: 20 TABLET | Refills: 0 | Status: SHIPPED | OUTPATIENT
Start: 2023-03-09 | End: 2023-03-19

## 2023-03-09 ASSESSMENT — ENCOUNTER SYMPTOMS
SORE THROAT: 0
CHEST TIGHTNESS: 0
ABDOMINAL PAIN: 0
NAUSEA: 0
COUGH: 1
VOMITING: 0
TROUBLE SWALLOWING: 0
SINUS PRESSURE: 0
SINUS PAIN: 0
WHEEZING: 1
DIARRHEA: 0
RHINORRHEA: 0
SHORTNESS OF BREATH: 1

## 2023-03-20 ENCOUNTER — TELEPHONE (OUTPATIENT)
Dept: PRIMARY CARE CLINIC | Age: 73
End: 2023-03-20

## 2023-03-21 DIAGNOSIS — J06.9 UPPER RESPIRATORY TRACT INFECTION, UNSPECIFIED TYPE: ICD-10-CM

## 2023-03-21 DIAGNOSIS — J40 BRONCHITIS: ICD-10-CM

## 2023-03-21 NOTE — TELEPHONE ENCOUNTER
Medication:   Requested Prescriptions     Pending Prescriptions Disp Refills    brompheniramine-pseudoephedrine-DM 2-30-10 MG/5ML syrup [Pharmacy Med Name: BROMPHEN-PSE-DM 2-30-10 MG/5ML] 120 mL 0     Sig: TAKE 5 MLS BY MOUTH 4 TIMES DAILY AS NEEDED FOR CONGESTION OR COUGH     Last Filled:  08/04/2022    Last appt: 3/9/2023   Next appt: 5/9/2023    Last OARRS:   RX Monitoring 2/6/2023   Attestation -   Periodic Controlled Substance Monitoring No signs of potential drug abuse or diversion identified.

## 2023-03-22 RX ORDER — BROMPHENIRAMINE MALEATE, PSEUDOEPHEDRINE HYDROCHLORIDE, AND DEXTROMETHORPHAN HYDROBROMIDE 2; 30; 10 MG/5ML; MG/5ML; MG/5ML
5 SYRUP ORAL 4 TIMES DAILY PRN
Qty: 120 ML | Refills: 0 | Status: SHIPPED | OUTPATIENT
Start: 2023-03-22

## 2023-03-23 RX ORDER — CLOBETASOL PROPIONATE 0.5 MG/G
OINTMENT TOPICAL
Qty: 60 G | Refills: 2 | Status: SHIPPED | OUTPATIENT
Start: 2023-03-23

## 2023-04-23 DIAGNOSIS — J44.9 CHRONIC OBSTRUCTIVE PULMONARY DISEASE, UNSPECIFIED COPD TYPE (HCC): ICD-10-CM

## 2023-04-24 RX ORDER — BUDESONIDE AND FORMOTEROL FUMARATE DIHYDRATE 160; 4.5 UG/1; UG/1
AEROSOL RESPIRATORY (INHALATION)
Qty: 30.6 EACH | Refills: 3 | Status: SHIPPED | OUTPATIENT
Start: 2023-04-24

## 2023-04-24 NOTE — TELEPHONE ENCOUNTER
Medication:   Requested Prescriptions     Pending Prescriptions Disp Refills    SYMBICORT 160-4.5 MCG/ACT AERO [Pharmacy Med Name: Corinn Sero 160-4.5 MCG INHALER] 30.6 each 3     Sig: TAKE 2 PUFFS BY MOUTH TWICE A DAY        Last Filled:      Patient Phone Number: [phone] (home)     Last appt: 3/9/2023   Next appt: 5/9/2023    Last OARRS:   RX Monitoring 2/6/2023   Attestation -   Periodic Controlled Substance Monitoring No signs of potential drug abuse or diversion identified. Preferred Pharmacy:   [facility]/pharmacy #[id number], [place] - [address] - P [phone] - F [phone]  [address] [address]  Phone: [phone] Fax: [phone]    [facility] [id number] - [place] - [address] [phone] [facility] [phone]  [address] [place] [address]  Phone: [phone] Fax: [phone]  Medication:   Requested Prescriptions     Pending Prescriptions Disp Refills    SYMBICORT 160-4.5 MCG/ACT AERO [Pharmacy Med Name: Corinn Sero 160-4.5 MCG INHALER] 30.6 each 3     Sig: TAKE 2 PUFFS BY MOUTH TWICE A DAY        Last Filled:      Patient Phone Number: [phone] (home)     Last appt: 3/9/2023   Next appt: 5/9/2023    Last OARRS:   RX Monitoring 2/6/2023   Attestation -   Periodic Controlled Substance Monitoring No signs of potential drug abuse or diversion identified. Preferred Pharmacy:   [facility]/pharmacy #[id number], [place] - [address] - P [phone] - F [phone]  [address] [facility] [address]  Phone: [phone] Fax: [phone]    [facility] [id number] [facility] [address] [facility] [place] [phone] [facility] [place] [phone]  [address] [place] [address]  Phone: [phone] Fax: [phone]

## 2023-05-11 DIAGNOSIS — E78.2 MIXED HYPERLIPIDEMIA: ICD-10-CM

## 2023-05-11 RX ORDER — ATORVASTATIN CALCIUM 40 MG/1
TABLET, FILM COATED ORAL
Qty: 90 TABLET | Refills: 1 | Status: SHIPPED | OUTPATIENT
Start: 2023-05-11

## 2023-05-11 NOTE — TELEPHONE ENCOUNTER
Medication:   Requested Prescriptions     Pending Prescriptions Disp Refills    atorvastatin (LIPITOR) 40 MG tablet [Pharmacy Med Name: ATORVASTATIN 40 MG TABLET] 90 tablet 1     Sig: TAKE 1 TABLET BY MOUTH EVERY DAY AT NIGHT     Last Filled:  11.4.22    Last appt: 3/9/2023   Next appt: 5/15/2023    Last Lipid:   Lab Results   Component Value Date/Time    CHOL 158 11/11/2022 08:14 AM    TRIG 88 11/11/2022 08:14 AM    HDL 62 11/11/2022 08:14 AM    LDLCALC 75 05/23/2022 12:00 AM

## 2023-05-15 ENCOUNTER — OFFICE VISIT (OUTPATIENT)
Dept: PRIMARY CARE CLINIC | Age: 73
End: 2023-05-15

## 2023-05-15 VITALS
WEIGHT: 187 LBS | DIASTOLIC BLOOD PRESSURE: 82 MMHG | SYSTOLIC BLOOD PRESSURE: 134 MMHG | BODY MASS INDEX: 29.35 KG/M2 | HEIGHT: 67 IN | OXYGEN SATURATION: 98 % | HEART RATE: 89 BPM

## 2023-05-15 DIAGNOSIS — F41.1 GENERALIZED ANXIETY DISORDER: ICD-10-CM

## 2023-05-15 DIAGNOSIS — J44.9 CHRONIC OBSTRUCTIVE PULMONARY DISEASE, UNSPECIFIED COPD TYPE (HCC): ICD-10-CM

## 2023-05-15 DIAGNOSIS — E03.9 ACQUIRED HYPOTHYROIDISM: ICD-10-CM

## 2023-05-15 DIAGNOSIS — N18.31 STAGE 3A CHRONIC KIDNEY DISEASE (HCC): ICD-10-CM

## 2023-05-15 DIAGNOSIS — I10 ESSENTIAL HYPERTENSION: Primary | ICD-10-CM

## 2023-05-15 DIAGNOSIS — M10.00 IDIOPATHIC GOUT, UNSPECIFIED CHRONICITY, UNSPECIFIED SITE: ICD-10-CM

## 2023-05-15 DIAGNOSIS — R73.03 PREDIABETES: ICD-10-CM

## 2023-05-15 DIAGNOSIS — I50.22 CHRONIC SYSTOLIC HEART FAILURE (HCC): ICD-10-CM

## 2023-05-15 DIAGNOSIS — E78.2 MIXED HYPERLIPIDEMIA: ICD-10-CM

## 2023-05-15 RX ORDER — ACETAMINOPHEN 500 MG
500 TABLET ORAL 4 TIMES DAILY PRN
Qty: 120 TABLET | Refills: 0 | Status: SHIPPED | OUTPATIENT
Start: 2023-05-15

## 2023-05-15 RX ORDER — ALPRAZOLAM 1 MG/1
TABLET ORAL
Qty: 30 TABLET | Refills: 2 | Status: SHIPPED | OUTPATIENT
Start: 2023-05-15 | End: 2023-05-16 | Stop reason: SDUPTHER

## 2023-05-15 RX ORDER — METHYLPREDNISOLONE 4 MG/1
TABLET ORAL
Qty: 1 KIT | Refills: 0 | Status: SHIPPED | OUTPATIENT
Start: 2023-05-15 | End: 2023-05-21

## 2023-05-15 RX ORDER — ALPRAZOLAM 1 MG/1
TABLET ORAL
COMMUNITY
Start: 2023-04-19 | End: 2023-05-15 | Stop reason: SDUPTHER

## 2023-05-15 ASSESSMENT — ENCOUNTER SYMPTOMS
CHEST TIGHTNESS: 0
DIARRHEA: 1
ABDOMINAL PAIN: 0
NAUSEA: 0
TROUBLE SWALLOWING: 0
VOMITING: 0
SINUS PAIN: 0
SHORTNESS OF BREATH: 0
RHINORRHEA: 0
WHEEZING: 0
SINUS PRESSURE: 0
COUGH: 0
CONSTIPATION: 1
SORE THROAT: 0

## 2023-05-15 NOTE — PROGRESS NOTES
Date of Visit: 5/15/2023    Ruthy Hill (:  1950) is a 67 y.o. male,  Established patient here for evaluation of the following chief complaint(s):  3 Month Follow-Up (Chronic conditions  hypertension, hyperlipidemia, anxiety, hypothyroidism, COPD,  and prediabetes.)      ASSESSMENT/PLAN:    1. Essential hypertension  -stable  -Continue carvedilol 6.25 mg 2 times daily  -Low sodium diet  -Regular aerobic exercise  -Comprehensive Metabolic Panel; Future    2. Mixed hyperlipidemia  -Stable  -Continue atorvastatin (LIPITOR) 40 MG tablet; TAKE 1 TABLET BY MOUTH EVERY DAY AT NIGHT  Dispense: 90 tablet; Refill: 1  -Low fat, low cholesterol diet  -Regular aerobic exercise  -Comprehensive Metabolic Panel; Future  -Lipid Panel; Future    3. Generalized anxiety disorder  -Stable overall  -Continue ALPRAZolam (XANAX) 1 MG tablet; TAKE 1/2 TABLET BY MOUTH EVERY DAY AND THEN TAKE 1 TABLET AT BEDTIME  Dispense: 30 tablet; Refill: 2    4. Acquired hypothyroidism  -stable  -Continue levothyroxine 100 mcg once daily  - TSH; Future    5. Prediabetes  -stable  -Low carbohydrate diet  -Regular aerobic exercise    6. Chronic obstructive pulmonary disease, unspecified COPD type (HCC)  -Stable  -Continue Symbicort 160-4.5 mcg HFA inhaler 2 puffs twice daily  -Continue albuterol (PROVENTIL) (2.5 MG/3ML) 0.083% nebulizer solution; Take 3 mLs by nebulization every 6 hours as needed   -Continue albuterol sulfate HFA (PROAIR HFA) 108 (90 Base) MCG/ACT inhaler; Inhale 2 puffs into the lungs every 6 hours as needed     7. Idiopathic gout, unspecified chronicity, unspecified site  - recent gout attack that is getting better but hasn't resolved  - start methylPREDNISolone (MEDROL DOSEPACK) 4 MG tablet; Take by mouth. Dispense: 1 kit; Refill: 0  - start acetaminophen (TYLENOL) 500 MG tablet; Take 1 tablet by mouth 4 times daily as needed for Pain  Dispense: 120 tablet;  Refill: 0  - purine restricted diet  -patient is not taking

## 2023-05-16 DIAGNOSIS — F41.1 GENERALIZED ANXIETY DISORDER: ICD-10-CM

## 2023-05-16 RX ORDER — ALPRAZOLAM 1 MG/1
TABLET ORAL
Qty: 45 TABLET | Refills: 2 | Status: SHIPPED | OUTPATIENT
Start: 2023-05-16 | End: 2023-06-15

## 2023-05-16 NOTE — TELEPHONE ENCOUNTER
----- Message from Rakna Fulton MA sent at 5/16/2023  3:17 PM EDT -----  Subject: Message to Provider    QUESTIONS  Information for Provider? Rx ALPRAZolam (XANAX) 1 MG tablet is 1 and 1/2   daily but Rx is only for 30 pills. Please call patient. Ph? [facility]   ---------------------------------------------------------------------------  --------------  Fco LEAL  [phone]; OK to leave message on voicemail  ---------------------------------------------------------------------------  --------------  SCRIPT ANSWERS  Relationship to Patient?  Self

## 2023-05-16 NOTE — TELEPHONE ENCOUNTER
Spoke with Shaun Harkins the [facility] pharmacist.  Prescription for Xanax should be for quantity of 45 tablets instead of 30 tablets as prescribed. He said patient already picked up the prescription for 30 tablets. I sent a new prescription for Xanax with a quantity of 45 tablets and 2 refills. Told Constantin to cancel the remaining prescription for Xanax with a quantity of 30 tablets. Called patient to inform him.

## 2023-05-25 ENCOUNTER — HOSPITAL ENCOUNTER (OUTPATIENT)
Dept: GENERAL RADIOLOGY | Age: 73
Discharge: HOME | End: 2023-05-25
Attending: FAMILY MEDICINE
Payer: MEDICARE

## 2023-05-25 ENCOUNTER — OFFICE VISIT (OUTPATIENT)
Dept: PRIMARY CARE CLINIC | Age: 73
End: 2023-05-25

## 2023-05-25 VITALS
OXYGEN SATURATION: 90 % | SYSTOLIC BLOOD PRESSURE: 106 MMHG | TEMPERATURE: 98.2 F | WEIGHT: 189.2 LBS | HEART RATE: 96 BPM | BODY MASS INDEX: 29.63 KG/M2 | DIASTOLIC BLOOD PRESSURE: 64 MMHG

## 2023-05-25 DIAGNOSIS — R22.42 LOCALIZED SWELLING OF LEFT FOOT: ICD-10-CM

## 2023-05-25 DIAGNOSIS — M10.9 ACUTE GOUT OF LEFT ANKLE, UNSPECIFIED CAUSE: ICD-10-CM

## 2023-05-25 DIAGNOSIS — M25.472 LEFT ANKLE SWELLING: ICD-10-CM

## 2023-05-25 DIAGNOSIS — M10.9 ACUTE GOUT OF LEFT ANKLE, UNSPECIFIED CAUSE: Primary | ICD-10-CM

## 2023-05-25 PROBLEM — C49.9 SOFT TISSUE SARCOMA (HCC): Status: ACTIVE | Noted: 2018-08-10

## 2023-05-25 PROBLEM — I50.9 CONGESTIVE HEART FAILURE (HCC): Status: ACTIVE | Noted: 2018-08-10

## 2023-05-25 LAB — URATE SERPL-MCNC: 6.9 MG/DL (ref 3.5–7.2)

## 2023-05-25 PROCEDURE — 73630 X-RAY EXAM OF FOOT: CPT

## 2023-05-25 PROCEDURE — 73610 X-RAY EXAM OF ANKLE: CPT

## 2023-05-25 RX ORDER — COLCHICINE 0.6 MG/1
TABLET ORAL
Qty: 14 TABLET | Refills: 0 | Status: SHIPPED | OUTPATIENT
Start: 2023-05-25

## 2023-05-25 ASSESSMENT — ENCOUNTER SYMPTOMS
COUGH: 0
ABDOMINAL PAIN: 0
SHORTNESS OF BREATH: 1
NAUSEA: 0
SORE THROAT: 0
DIARRHEA: 0
VOMITING: 0

## 2023-05-25 NOTE — PROGRESS NOTES
Hedy Maya (:  1950) is a 67 y.o. male,Established patient, here for evaluation of the following chief complaint(s):  Joint Swelling (Ankle and foot swelling in left foot. For 3 1/2 - 4 weeks )      ASSESSMENT/PLAN:  1. Acute gout of left ankle, unspecified cause  -     Uric Acid; Future  -     XR ANKLE LEFT (MIN 3 VIEWS); Future  -     XR FOOT LEFT (MIN 3 VIEWS); Future  -     colchicine (COLCRYS) 0.6 MG tablet; Take 2 tabs today , then take 1 tab twice a day  for a total of 7 days, Disp-14 tablet, R-0Normal  2. Localized swelling of left foot  -     Uric Acid; Future  -     XR ANKLE LEFT (MIN 3 VIEWS); Future  -     XR FOOT LEFT (MIN 3 VIEWS); Future  -     colchicine (COLCRYS) 0.6 MG tablet; Take 2 tabs today , then take 1 tab twice a day  for a total of 7 days, Disp-14 tablet, R-0Normal  3. Left ankle swelling  -     Uric Acid; Future  -     XR ANKLE LEFT (MIN 3 VIEWS); Future  -     XR FOOT LEFT (MIN 3 VIEWS); Future  -     colchicine (COLCRYS) 0.6 MG tablet; Take 2 tabs today , then take 1 tab twice a day  for a total of 7 days, Disp-14 tablet, R-0Normal      Patient has recurrent gout, has been treated with steroid injection and oral steroids in the last 2 months, recent 110 days ago. Usually better with steroids but then swells up again. We will also get an x-ray along with uric acid. Colchicine to treat. Discussed allopurinol prescription once acute gout phase has improved, in order to prevent flareups  Patient agreeable to plan and demonstrates understanding    No follow-ups on file.     SUBJECTIVE/OBJECTIVE:  HPI    Left Ankle Swelling  Left Foot Swelling   - this has been presents for 3.5-4 weeks  - was seen by podiatrist and was given steroids in both feet  - did not get better - then was in a wheelchair for 2 weeks  - then went biking and might have   -Was seen on 5/15 and diagnosed with gout and was started on Medrol Dosepak  - uric acid was ordered but not

## 2023-05-30 LAB
AGE TIME: 72 YRS
ALBUMIN: 3 G/DL (ref 3.5–5)
ALP BLD-CCNC: 136 U/L (ref 46–116)
ALT SERPL-CCNC: 16 U/L (ref 21–72)
ANION GAP: 8 MMOL/L (ref 7–16)
AST SERPL-CCNC: 13 U/L (ref 17–59)
BUN BLDV-MCNC: 30 MG/DL (ref 7–20)
CALCIUM SERPL-MCNC: 8.8 MG/DL (ref 8.5–10.1)
CHLORIDE BLD-SCNC: 103 MMOL/L (ref 98–108)
CHOLESTEROL: 166 MG/DL (ref 0–200)
CO2: 30.4 MMOL/L (ref 21–32)
COMPREHENSIVE METABOLIC PANEL: ABNORMAL
CREATININE: 1.4 MG/DL (ref 0.7–1.3)
GFR AFRICAN AMERICAN: 60 ML/MIN
GFR NON-AFRICAN AMERICAN: 50 ML/MIN
GLUCOSE: 74 MG/DL (ref 70–110)
HDLC SERPL-MCNC: 43 MG/DL (ref 40–100)
LDL CHOLESTEROL: 93 MG/DL (ref 0–160)
LIPID PANEL: NORMAL
POTASSIUM SERPL-SCNC: 3.8 MMOL/L (ref 3.6–5)
SODIUM BLD-SCNC: 141 MMOL/L (ref 137–148)
TOTAL BILIRUBIN: 0.31 MG/DL (ref 0.1–1.2)
TOTAL PROTEIN: 6.6 G/DL (ref 6.4–8.2)
TRIGL SERPL-MCNC: 149 MG/DL (ref 0–150)
TSH SERPL DL<=0.05 MIU/L-ACNC: 3.75 UIU/ML (ref 0.34–4.82)
VLDLC SERPL CALC-MCNC: 30 MG/DL

## 2023-06-22 LAB
LEFT VENTRICULAR EJECTION FRACTION, EXTERNAL: 47
LEFT VENTRICULAR EJECTION FRACTION, EXTERNAL: 47
LEFT VENTRICULAR EJECTION FRACTION, EXTERNAL: NORMAL

## 2023-08-24 ENCOUNTER — OFFICE VISIT (OUTPATIENT)
Dept: PRIMARY CARE CLINIC | Age: 73
End: 2023-08-24

## 2023-08-24 VITALS
TEMPERATURE: 98.3 F | HEIGHT: 67 IN | SYSTOLIC BLOOD PRESSURE: 136 MMHG | DIASTOLIC BLOOD PRESSURE: 84 MMHG | RESPIRATION RATE: 18 BRPM | OXYGEN SATURATION: 95 % | BODY MASS INDEX: 30.45 KG/M2 | WEIGHT: 194 LBS | HEART RATE: 73 BPM

## 2023-08-24 DIAGNOSIS — R73.03 PREDIABETES: ICD-10-CM

## 2023-08-24 DIAGNOSIS — Z00.00 MEDICARE ANNUAL WELLNESS VISIT, SUBSEQUENT: Primary | ICD-10-CM

## 2023-08-24 DIAGNOSIS — E79.0 ELEVATED BLOOD URIC ACID LEVEL: ICD-10-CM

## 2023-08-24 DIAGNOSIS — F41.1 GENERALIZED ANXIETY DISORDER: ICD-10-CM

## 2023-08-24 DIAGNOSIS — M10.00 IDIOPATHIC GOUT, UNSPECIFIED CHRONICITY, UNSPECIFIED SITE: ICD-10-CM

## 2023-08-24 PROBLEM — C49.9 SOFT TISSUE SARCOMA (HCC): Status: RESOLVED | Noted: 2018-08-10 | Resolved: 2023-08-24

## 2023-08-24 PROBLEM — N18.31 STAGE 3A CHRONIC KIDNEY DISEASE (HCC): Status: ACTIVE | Noted: 2023-08-24

## 2023-08-24 LAB — HBA1C MFR BLD: 6.2 %

## 2023-08-24 RX ORDER — ALLOPURINOL 100 MG/1
100 TABLET ORAL DAILY
Qty: 30 TABLET | Refills: 3 | Status: SHIPPED | OUTPATIENT
Start: 2023-08-24

## 2023-08-24 ASSESSMENT — LIFESTYLE VARIABLES
HOW MANY STANDARD DRINKS CONTAINING ALCOHOL DO YOU HAVE ON A TYPICAL DAY: PATIENT DOES NOT DRINK
HOW OFTEN DO YOU HAVE A DRINK CONTAINING ALCOHOL: 2-4 TIMES A MONTH

## 2023-08-24 ASSESSMENT — PATIENT HEALTH QUESTIONNAIRE - PHQ9
SUM OF ALL RESPONSES TO PHQ QUESTIONS 1-9: 0
1. LITTLE INTEREST OR PLEASURE IN DOING THINGS: 0
SUM OF ALL RESPONSES TO PHQ9 QUESTIONS 1 & 2: 0
SUM OF ALL RESPONSES TO PHQ QUESTIONS 1-9: 0
2. FEELING DOWN, DEPRESSED OR HOPELESS: 0
SUM OF ALL RESPONSES TO PHQ QUESTIONS 1-9: 0
SUM OF ALL RESPONSES TO PHQ QUESTIONS 1-9: 0

## 2023-08-24 NOTE — PATIENT INSTRUCTIONS
in full while other may be subject to a deductible, co-insurance, and/or copay. Some of these benefits include a comprehensive review of your medical history including lifestyle, illnesses that may run in your family, and various assessments and screenings as appropriate. After reviewing your medical record and screening and assessments performed today your provider may have ordered immunizations, labs, imaging, and/or referrals for you. A list of these orders (if applicable) as well as your Preventive Care list are included within your After Visit Summary for your review. Other Preventive Recommendations:    A preventive eye exam performed by an eye specialist is recommended every 1-2 years to screen for glaucoma; cataracts, macular degeneration, and other eye disorders. A preventive dental visit is recommended every 6 months. Try to get at least 150 minutes of exercise per week or 10,000 steps per day on a pedometer . Order or download the FREE \"Exercise & Physical Activity: Your Everyday Guide\" from [employer]. Call 7-362.705.1364 or search [employer] online. You need 4305-7825 mg of calcium and 8107-9052 IU of vitamin D per day. It is possible to meet your calcium requirement with diet alone, but a vitamin D supplement is usually necessary to meet this goal.  When exposed to the sun, use a sunscreen that protects against both UVA and UVB radiation with an SPF of 30 or greater. Reapply every 2 to 3 hours or after sweating, drying off with a towel, or swimming. Always wear a seat belt when traveling in a car. Always wear a helmet when riding a bicycle or motorcycle.

## 2023-08-24 NOTE — PROGRESS NOTES
Medicare Annual Wellness Visit    Kai Torres is here for Medicare AWV    Assessment & Plan     1. Medicare annual wellness visit, subsequent  -Medicare AWV done    2. Generalized anxiety disorder  -stable  -Continue Xanax 1mg nightly and 1/2 tablet once daily  -relaxation techniques    3. Idiopathic gout, unspecified chronicity, unspecified site  - uric acid is elevated  - last gout attack in June  - Start allopurinol (ZYLOPRIM) 100 MG tablet; Take 1 tablety mouth daily  Dispense: 30 tablet; Refill: 3  - Uric Acid; Future    4. Elevated blood uric acid level  - last gout attack in June  - Start allopurinol (ZYLOPRIM) 100 MG tablet; Take 1 tablet by mouth daily  Dispense: 30 tablet; Refill: 3  - Uric Acid; Future    5. Prediabetes  -stable  -Low carbohydrate diet  -Regular aerobic exercise   -POCT glycosylated hemoglobin (Hb A1C)        Recommendations for Preventive Services Due: see orders and patient instructions/AVS.  Recommended screening schedule for the next 5-10 years is provided to the patient in written form: see Patient Instructions/AVS.       Return in about 3 months (around 11/24/2023) for hypertension, hyperlipidemia, hypothyroidism, anxiety, gout, elevated uric acid, and COPD. Subjective   The following acute and/or chronic problems were also addressed today:    Patient has anxiety. Patient takes Xanax 1 mg 1 tablet nightly and 1/2 tablet every day. Patient states his anxiety is the same. Patient states he is a worrier in general. Patient denies feeling nervous and jittery. Patient has gout and elevated uric acid. Patient states his Cardiologist prescribed Allopurinol and dose not available. Patient states his last gout attack was in June. Patient has prediabetes. Patient doesn't decrease carbohydrates. Patient is overdue for Hemoglobin A1c. Patient is not exercising. Patient declines AAA screening. Patient declines colon cancer screening.         Patient's complete Health Risk

## 2023-09-18 DIAGNOSIS — R09.81 NASAL CONGESTION: ICD-10-CM

## 2023-09-18 DIAGNOSIS — F41.1 GENERALIZED ANXIETY DISORDER: ICD-10-CM

## 2023-09-18 RX ORDER — ALPRAZOLAM 1 MG/1
TABLET ORAL
Qty: 45 TABLET | Refills: 2 | Status: SHIPPED | OUTPATIENT
Start: 2023-09-18 | End: 2023-10-18

## 2023-09-18 RX ORDER — FLUTICASONE PROPIONATE 50 MCG
SPRAY, SUSPENSION (ML) NASAL
Qty: 48 G | Refills: 1 | Status: SHIPPED | OUTPATIENT
Start: 2023-09-18

## 2023-09-18 NOTE — TELEPHONE ENCOUNTER
Medication:   Requested Prescriptions     Pending Prescriptions Disp Refills    ALPRAZolam (XANAX) 1 MG tablet [Pharmacy Med Name: ALPRAZOLAM 1 MG TABLET] 45 tablet 2     Sig: TAKE 1/2 TABLET BY MOUTH EVERY DAY AND THEN TAKE 1 TABLET AT BEDTIME    fluticasone (FLONASE) 50 MCG/ACT nasal spray [Pharmacy Med Name: FLUTICASONE PROP 50 MCG SPRAY]  1     Sig: INSTILL 2 SPRAYS IN NOSTRIL EVERY DAY     Last Filled:  alprazolam - 5/16/2023  Fluticasone - 11/4/2022    Last appt: 8/24/2023   Next appt: 11/29/2023    Last OARRS:       8/24/2023    11:30 AM   RX Monitoring   Periodic Controlled Substance Monitoring No signs of potential drug abuse or diversion identified.

## 2023-09-23 DIAGNOSIS — E79.0 ELEVATED BLOOD URIC ACID LEVEL: ICD-10-CM

## 2023-09-23 DIAGNOSIS — M10.00 IDIOPATHIC GOUT, UNSPECIFIED CHRONICITY, UNSPECIFIED SITE: ICD-10-CM

## 2023-09-25 RX ORDER — ALLOPURINOL 100 MG/1
100 TABLET ORAL DAILY
Qty: 30 TABLET | Refills: 3 | Status: SHIPPED | OUTPATIENT
Start: 2023-09-25

## 2023-09-25 NOTE — TELEPHONE ENCOUNTER
Medication:   Requested Prescriptions     Pending Prescriptions Disp Refills    allopurinol (ZYLOPRIM) 100 MG tablet [Pharmacy Med Name: ALLOPURINOL 100 MG TABLET] 30 tablet 3     Sig: TAKE 1 TABLET BY MOUTH EVERY DAY     Last Filled:  8/24/2023    Last appt: 8/24/2023   Next appt: 11/29/2023    Last OARRS:       8/24/2023    11:30 AM   RX Monitoring   Periodic Controlled Substance Monitoring No signs of potential drug abuse or diversion identified.

## 2023-10-03 ENCOUNTER — OFFICE VISIT (OUTPATIENT)
Dept: DERMATOLOGY | Age: 73
End: 2023-10-03
Payer: MEDICARE

## 2023-10-03 DIAGNOSIS — R21 RASH: Primary | ICD-10-CM

## 2023-10-03 PROCEDURE — 1123F ACP DISCUSS/DSCN MKR DOCD: CPT | Performed by: DERMATOLOGY

## 2023-10-03 PROCEDURE — 99213 OFFICE O/P EST LOW 20 MIN: CPT | Performed by: DERMATOLOGY

## 2023-10-03 PROCEDURE — 11102 TANGNTL BX SKIN SINGLE LES: CPT | Performed by: DERMATOLOGY

## 2023-10-03 NOTE — PROGRESS NOTES
patches. Assessment and Plan     1. 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 Risks of the procedure were reviewed including discomfort, bleeding, scar and infection. The area(s) to be biopsied were marked with a surgical pen. Alcohol was used to cleanse the site. Local anesthesia was acheived with 1% lidocaine with epinephrine. Shave biopsy was performed using a razor blade. Hemostasis was achieved with aluminum chloride. The wound(s) were dressed with petrolatum and covered with a bandage. Wound care instructions were reviewed. 1 Specimen (s) sent to pathology. The specimen bottles were appropriately labeled. The patient tolerated the procedure well and there were no immediate complications. Discussed Dupixent therapy if biopsy consistent with chronic dermatitis. Also discussed home-based phototherapy (would use Daavlin). Will discuss further once biopsy result available.           --Nancy Chung MD

## 2023-10-03 NOTE — PATIENT INSTRUCTIONS
Biopsy Wound Care Instructions    Keep the bandage in place for 24 hours. Cleanse the wound with mild soapy water daily  Gently dry the area. Apply Vaseline or petroleum jelly to the wound using a cotton tipped applicator. Cover with a clean bandage. Repeat this process until the biopsy site is healed. If you had stitches placed, continue treating the site until the stitches are removed. Remember to make an appointment to return to have your stitches removed by our staff. You may shower and bathe as usual.       ** Biopsy results generally take around 7 business days to come back. If you have not heard from us by then, please call the office at [phone]. *Please note that biopsy results are released to both the patient and physician at the same time in [address] [place] [address]. Please allow time for your physician to review the results. One of our staff members will reach out to you with the results and plan.

## 2023-10-13 ENCOUNTER — TELEPHONE (OUTPATIENT)
Dept: DERMATOLOGY | Age: 73
End: 2023-10-13

## 2023-10-13 NOTE — TELEPHONE ENCOUNTER
Biopsy done on 10/3/23. Left message advising patient that biopsy results are not back yet but that we would call him once they are and Dr Destini Jackson has reviewed them.

## 2023-10-13 NOTE — TELEPHONE ENCOUNTER
Pt is calling because he had a biopsy about three weeks ago and has not gotten the results yet. Pt was told the results were not in. He was wondering if someone could check on this for him?     Phone [phone]

## 2023-10-17 LAB — DERMATOLOGY PATHOLOGY REPORT: NORMAL

## 2023-10-18 DIAGNOSIS — E03.9 ACQUIRED HYPOTHYROIDISM: ICD-10-CM

## 2023-10-18 NOTE — TELEPHONE ENCOUNTER
Medication:   Requested Prescriptions     Pending Prescriptions Disp Refills    levothyroxine (SYNTHROID) 100 MCG tablet [Pharmacy Med Name: LEVOTHYROXINE 100 MCG TABLET] 90 tablet 1     Sig: TAKE 1 TABLET BY MOUTH EVERY DAY     Last Filled:  4/13/2023    Last appt: 8/24/2023   Next appt: 11/29/2023    Last Thyroid:   Lab Results   Component Value Date/Time    TSH 3.75 05/30/2023 09:19 AM    T4FREE 1.27 06/29/2018 10:05 AM

## 2023-10-19 RX ORDER — LEVOTHYROXINE SODIUM 0.1 MG/1
TABLET ORAL
Qty: 90 TABLET | Refills: 1 | Status: SHIPPED | OUTPATIENT
Start: 2023-10-19

## 2023-11-14 DIAGNOSIS — E78.2 MIXED HYPERLIPIDEMIA: ICD-10-CM

## 2023-11-14 RX ORDER — ATORVASTATIN CALCIUM 40 MG/1
TABLET, FILM COATED ORAL
Qty: 90 TABLET | Refills: 1 | Status: SHIPPED | OUTPATIENT
Start: 2023-11-14

## 2023-11-27 LAB — URIC ACID: 5.2 MG/DL (ref 3.5–7.2)

## 2023-11-29 ENCOUNTER — OFFICE VISIT (OUTPATIENT)
Dept: PRIMARY CARE CLINIC | Age: 73
End: 2023-11-29

## 2023-11-29 VITALS
TEMPERATURE: 97.7 F | HEIGHT: 67 IN | BODY MASS INDEX: 31.27 KG/M2 | HEART RATE: 73 BPM | SYSTOLIC BLOOD PRESSURE: 138 MMHG | WEIGHT: 199.2 LBS | DIASTOLIC BLOOD PRESSURE: 84 MMHG | OXYGEN SATURATION: 95 % | RESPIRATION RATE: 16 BRPM

## 2023-11-29 DIAGNOSIS — F41.1 GENERALIZED ANXIETY DISORDER: ICD-10-CM

## 2023-11-29 DIAGNOSIS — R73.03 PREDIABETES: ICD-10-CM

## 2023-11-29 DIAGNOSIS — M10.00 IDIOPATHIC GOUT, UNSPECIFIED CHRONICITY, UNSPECIFIED SITE: ICD-10-CM

## 2023-11-29 DIAGNOSIS — E03.9 ACQUIRED HYPOTHYROIDISM: ICD-10-CM

## 2023-11-29 DIAGNOSIS — E78.2 MIXED HYPERLIPIDEMIA: ICD-10-CM

## 2023-11-29 DIAGNOSIS — Z12.5 SCREENING PSA (PROSTATE SPECIFIC ANTIGEN): ICD-10-CM

## 2023-11-29 DIAGNOSIS — I10 ESSENTIAL HYPERTENSION: Primary | ICD-10-CM

## 2023-11-29 DIAGNOSIS — J44.9 CHRONIC OBSTRUCTIVE PULMONARY DISEASE, UNSPECIFIED COPD TYPE (HCC): ICD-10-CM

## 2023-11-29 DIAGNOSIS — Z79.899 MEDICATION MANAGEMENT: ICD-10-CM

## 2023-11-29 DIAGNOSIS — Z23 NEED FOR INFLUENZA VACCINATION: ICD-10-CM

## 2023-11-29 NOTE — PROGRESS NOTES
Date of Visit: 2023    Jn Constantino (:  1950) is a 68 y.o. male,  Established patient here for evaluation of the following chief complaint(s):  Hyperlipidemia, Hypertension, Hypothyroidism, Anxiety, Gout, and Other (elevated uric acid, and COPD/Going out of town so wanted to talk about that)      ASSESSMENT/PLAN:    1. Essential hypertension  -stable  -Continue carvedilol 6.25 mg 2 times daily  -Low sodium diet  -Regular aerobic exercise    2. Mixed hyperlipidemia  -stable  -Continue Atorvastatin 40mg nightly  -Low fat, low cholesterol diet  -Regular aerobic exercise  -Lipid Panel; Future  -Hepatic Function Panel; Future    3. Generalized anxiety disorder  -stable  -Continue Xanax 1 mg 1 tablet nightly and 1/2 tablet every day  -relaxation techniques    4. Acquired hypothyroidism  -stable  -Continue Levothyroxine 100mcg once daily  -TSH; Future    5. Prediabetes  -Low carbohydrate diet  -Regular aerobic exercise  -Hemoglobin A1C; Future    6. Chronic obstructive pulmonary disease, unspecified COPD type ([address])  -stable  -Continue Symbicort 160-4.5 mcg HFA inhaler 2 puffs twice daily   -Continue ProAir HFA inhaler as needed    7. Idiopathic gout, unspecified chronicity, unspecified site  -stable  -Continue Allopurinol 100mg once daily    8. Need for influenza vaccination  - Influenza, FLUAD, (age 72 y+), IM, Preservative Free, 0.5 mL given    9. Screening PSA (prostate specific antigen)  - PSA Screening; Future    10. Medication management  - Drug Panel-PM-HI Res-UR Interp-A        Return in about 3 months (around 2024) for anxiety, hypertension, COPD. SUBJECTIVE:    Patient has hypertension. Patient takes carvedilol 6.25 mg 2 times daily. Patient decreases salt. Patient does not exercise. Patient has hyperlipidemia. Patient takes Atorvastatin 40mg nightly. Patient decreases fat and cholesterol. Patient has anxiety.  Patient takes Xanax 1 mg 1 tablet nightly and 1/2 tablet every

## 2023-12-02 LAB
6MAM UR QL: NOT DETECTED
7AMINOCLONAZEPAM UR QL: NOT DETECTED
A-OH ALPRAZ UR QL: PRESENT
ALPHA-OH-MIDAZOLAM, URINE: NOT DETECTED
ALPRAZ UR QL: PRESENT
AMPHET UR QL SCN: NOT DETECTED
ANNOTATION COMMENT IMP: NORMAL
ANNOTATION COMMENT IMP: NORMAL
BARBITURATES UR QL: NEGATIVE
BUPRENORPHINE UR QL: NOT DETECTED
BZE UR QL: NEGATIVE
CARBOXYTHC UR QL: NEGATIVE
CARISOPRODOL UR QL: NEGATIVE
CLONAZEPAM UR QL: NOT DETECTED
CODEINE UR QL: NOT DETECTED
CREAT UR-MCNC: 75.2 MG/DL (ref 20–400)
DIAZEPAM UR QL: NOT DETECTED
ETHYL GLUCURONIDE UR QL: NORMAL
FENTANYL UR QL: NOT DETECTED
GABAPENTIN: NOT DETECTED
HYDROCODONE UR QL: NOT DETECTED
HYDROMORPHONE UR QL: NOT DETECTED
LORAZEPAM UR QL: NOT DETECTED
MDA UR QL: NOT DETECTED
MDEA UR QL: NOT DETECTED
MDMA UR QL: NOT DETECTED
MEPERIDINE UR QL: NOT DETECTED
METHADONE UR QL: NEGATIVE
METHAMPHET UR QL: NOT DETECTED
MIDAZOLAM UR QL SCN: NOT DETECTED
MORPHINE UR QL: NOT DETECTED
NALOXONE: NOT DETECTED
NORBUPRENORPHINE UR QL CFM: NOT DETECTED
NORDIAZEPAM UR QL: NOT DETECTED
NORFENTANYL UR QL: NOT DETECTED
NORHYDROCODONE UR QL CFM: NOT DETECTED
NOROXYCODONE UR QL CFM: NOT DETECTED
NOROXYMORPHONE, URINE: NOT DETECTED
OXAZEPAM UR QL: NOT DETECTED
OXYCODONE UR QL: NOT DETECTED
OXYMORPHONE UR QL: NOT DETECTED
PATHOLOGY STUDY: NORMAL
PCP UR QL: NEGATIVE
PHENTERMINE UR QL: NOT DETECTED
PPAA UR QL: NOT DETECTED
PREGABALIN: NOT DETECTED
SERVICE CMNT-IMP: NORMAL
TAPENTADOL UR QL SCN: NOT DETECTED
TAPENTADOL-O-SULFATE, URINE: NOT DETECTED
TEMAZEPAM UR QL: NOT DETECTED
TRAMADOL UR QL: NEGATIVE
ZOLPIDEM UR QL: NOT DETECTED

## 2024-01-01 DIAGNOSIS — M10.00 IDIOPATHIC GOUT, UNSPECIFIED CHRONICITY, UNSPECIFIED SITE: ICD-10-CM

## 2024-01-01 DIAGNOSIS — E79.0 ELEVATED BLOOD URIC ACID LEVEL: ICD-10-CM

## 2024-01-02 RX ORDER — ALLOPURINOL 100 MG/1
100 TABLET ORAL DAILY
Qty: 90 TABLET | Refills: 1 | Status: SHIPPED | OUTPATIENT
Start: 2024-01-02

## 2024-01-02 NOTE — TELEPHONE ENCOUNTER
Medication:   Requested Prescriptions     Pending Prescriptions Disp Refills    allopurinol (ZYLOPRIM) 100 MG tablet [Pharmacy Med Name: ALLOPURINOL 100 MG TABLET] 90 tablet 1     Sig: TAKE 1 TABLET BY MOUTH EVERY DAY     Last Filled:  9.25.23    Last appt: 11/29/2023   Next appt: 2/29/2024    Last OARRS:       11/29/2023    12:10 PM   RX Monitoring   Periodic Controlled Substance Monitoring No signs of potential drug abuse or diversion identified.

## 2024-01-11 ENCOUNTER — OFFICE VISIT (OUTPATIENT)
Dept: PRIMARY CARE CLINIC | Age: 74
End: 2024-01-11

## 2024-01-11 ENCOUNTER — HOSPITAL ENCOUNTER (OUTPATIENT)
Dept: GENERAL RADIOLOGY | Age: 74
Discharge: HOME | End: 2024-01-11
Payer: MEDICARE

## 2024-01-11 ENCOUNTER — TELEPHONE (OUTPATIENT)
Dept: DERMATOLOGY | Age: 74
End: 2024-01-11

## 2024-01-11 VITALS
HEART RATE: 100 BPM | TEMPERATURE: 97.2 F | DIASTOLIC BLOOD PRESSURE: 60 MMHG | OXYGEN SATURATION: 96 % | BODY MASS INDEX: 31.07 KG/M2 | WEIGHT: 198.4 LBS | SYSTOLIC BLOOD PRESSURE: 110 MMHG

## 2024-01-11 DIAGNOSIS — M25.421 SWELLING OF RIGHT ELBOW: ICD-10-CM

## 2024-01-11 DIAGNOSIS — M70.31 BURSITIS OF RIGHT ELBOW, UNSPECIFIED BURSA: ICD-10-CM

## 2024-01-11 DIAGNOSIS — N18.30 STAGE 3 CHRONIC KIDNEY DISEASE, UNSPECIFIED WHETHER STAGE 3A OR 3B CKD (HCC): ICD-10-CM

## 2024-01-11 DIAGNOSIS — L03.113 CELLULITIS OF RIGHT UPPER EXTREMITY: ICD-10-CM

## 2024-01-11 DIAGNOSIS — M25.421 SWELLING OF RIGHT ELBOW: Primary | ICD-10-CM

## 2024-01-11 LAB
ANION GAP SERPL CALCULATED.3IONS-SCNC: 11 MMOL/L (ref 3–16)
BUN SERPL-MCNC: 31 MG/DL (ref 7–20)
CALCIUM SERPL-MCNC: 8.8 MG/DL (ref 8.3–10.6)
CHLORIDE SERPL-SCNC: 98 MMOL/L (ref 99–110)
CO2 SERPL-SCNC: 27 MMOL/L (ref 21–32)
CREAT SERPL-MCNC: 1.2 MG/DL (ref 0.8–1.3)
DEPRECATED RDW RBC AUTO: 15.4 % (ref 12.4–15.4)
GFR SERPLBLD CREATININE-BSD FMLA CKD-EPI: >60 ML/MIN/{1.73_M2}
GLUCOSE SERPL-MCNC: 111 MG/DL (ref 70–99)
HCT VFR BLD AUTO: 43.9 % (ref 40.5–52.5)
HGB BLD-MCNC: 14.6 G/DL (ref 13.5–17.5)
MCH RBC QN AUTO: 31.2 PG (ref 26–34)
MCHC RBC AUTO-ENTMCNC: 33.3 G/DL (ref 31–36)
MCV RBC AUTO: 93.8 FL (ref 80–100)
PLATELET # BLD AUTO: 201 K/UL (ref 135–450)
PMV BLD AUTO: 9.5 FL (ref 5–10.5)
POTASSIUM SERPL-SCNC: 4.2 MMOL/L (ref 3.5–5.1)
RBC # BLD AUTO: 4.68 M/UL (ref 4.2–5.9)
SODIUM SERPL-SCNC: 136 MMOL/L (ref 136–145)
URATE SERPL-MCNC: 7.2 MG/DL (ref 3.5–7.2)
WBC # BLD AUTO: 13 K/UL (ref 4–11)

## 2024-01-11 PROCEDURE — 73080 X-RAY EXAM OF ELBOW: CPT

## 2024-01-11 RX ORDER — METHYLPREDNISOLONE 4 MG/1
TABLET ORAL
Qty: 1 KIT | Refills: 0 | Status: SHIPPED | OUTPATIENT
Start: 2024-01-11

## 2024-01-11 RX ORDER — CEPHALEXIN 500 MG/1
500 CAPSULE ORAL 3 TIMES DAILY
Qty: 30 CAPSULE | Refills: 0 | Status: SHIPPED | OUTPATIENT
Start: 2024-01-11 | End: 2024-01-21

## 2024-01-11 ASSESSMENT — PATIENT HEALTH QUESTIONNAIRE - PHQ9
2. FEELING DOWN, DEPRESSED OR HOPELESS: 0
SUM OF ALL RESPONSES TO PHQ QUESTIONS 1-9: 0
SUM OF ALL RESPONSES TO PHQ9 QUESTIONS 1 & 2: 0
1. LITTLE INTEREST OR PLEASURE IN DOING THINGS: 0
SUM OF ALL RESPONSES TO PHQ QUESTIONS 1-9: 0

## 2024-01-11 ASSESSMENT — ENCOUNTER SYMPTOMS
SHORTNESS OF BREATH: 0
DIARRHEA: 0
COUGH: 0
VOMITING: 0
NAUSEA: 0
WHEEZING: 0

## 2024-01-11 NOTE — TELEPHONE ENCOUNTER
Pt calling to get the name of the referral for the allergist that Dr. Mae recommended at his last visit. Phone [phone]

## 2024-01-11 NOTE — TELEPHONE ENCOUNTER
Called and spoke to patient and advised him of practice Dr Mae referred patient to.   He asked if I could send the info via [employer].

## 2024-01-11 NOTE — PROGRESS NOTES
Neurological:  Negative for dizziness, light-headedness and headaches.   Hematological:  Negative for adenopathy.        VITALS:  /60   Pulse 100   Temp 97.2 °F (36.2 °C) (Temporal)   Wt 90 kg (198 lb 6.4 oz)   SpO2 96%   BMI 31.07 kg/m²      PE:  Physical Exam  Vitals and nursing note reviewed.   Constitutional:       General: He is not in acute distress.     Appearance: Normal appearance. He is well-developed and normal weight. He is not diaphoretic.   Cardiovascular:      Rate and Rhythm: Normal rate.   Pulmonary:      Effort: Pulmonary effort is normal. No respiratory distress.   Musculoskeletal:      Right elbow: Swelling and effusion present. Normal range of motion. Tenderness present.        Arms:    Skin:     General: Skin is warm and dry.      Findings: No rash.   Neurological:      Mental Status: He is alert and oriented to person, place, and time.      Motor: No weakness.      Gait: Gait normal.   Psychiatric:         Mood and Affect: Mood normal.         Behavior: Behavior normal.          Patient Active Problem List   Diagnosis    Hypertension    COPD (chronic obstructive pulmonary disease) (HCC)    Hyperlipidemia    Hypothyroidism    Anxiety    Foot pain    Abdominal hernia    Back pain    Elevated alkaline phosphatase level    Rash    Routine general medical examination at a health care facility    Ventral hernia    Skin infection    Abrasion of arm, left    Prediabetes    Contusion of wrist    Right knee pain    Neck pain    Right shoulder pain    Mid back pain    Left leg pain    Shortness of breath    Fatigue    Acute systolic heart failure (HCC)    Localized edema    Alcohol abuse    Lightheadedness    Chronic systolic heart failure (HCC)    Stage 3 chronic kidney disease (HCC)    Generalized anxiety disorder    Idiopathic gout of foot    Polyp of colon    Impacted cerumen    Elevated blood uric acid level    Right arm pain    Nonintractable headache    Nasal congestion    Chronic

## 2024-01-15 ENCOUNTER — OFFICE VISIT (OUTPATIENT)
Dept: ORTHOPEDIC SURGERY | Age: 74
End: 2024-01-15
Payer: MEDICARE

## 2024-01-15 VITALS — BODY MASS INDEX: 31.08 KG/M2 | WEIGHT: 198 LBS | HEIGHT: 67 IN

## 2024-01-15 DIAGNOSIS — N18.31 STAGE 3A CHRONIC KIDNEY DISEASE (HCC): ICD-10-CM

## 2024-01-15 DIAGNOSIS — L03.113 CELLULITIS OF RIGHT ELBOW: Primary | ICD-10-CM

## 2024-01-15 DIAGNOSIS — Z87.39 HISTORY OF GOUT: ICD-10-CM

## 2024-01-15 DIAGNOSIS — M70.21 OLECRANON BURSITIS OF RIGHT ELBOW: ICD-10-CM

## 2024-01-15 PROCEDURE — 99204 OFFICE O/P NEW MOD 45 MIN: CPT | Performed by: INTERNAL MEDICINE

## 2024-01-15 PROCEDURE — 1123F ACP DISCUSS/DSCN MKR DOCD: CPT | Performed by: INTERNAL MEDICINE

## 2024-01-15 PROCEDURE — MISCD121 PROTECTOR HEEL OR ELBOW (RETAIL): Performed by: INTERNAL MEDICINE

## 2024-01-15 NOTE — PROGRESS NOTES
Chief Complaint:   Chief Complaint   Patient presents with    Arm Pain     RIGHT ELBOW-KNOWN CELLULITIS FROM LYING ON HIS ELBOWS AT NIGHT.          History of Present Illness:       Patient is a 73 y.o. male presents with the above complaint.  He is seen in consultation at request of Judit Aguayo, CNP diagnosis cellulitis of the extensor elbow and olecranon bursitis.  Symptoms started on January 8 he was evaluated and treated on 1/11/2024 as reviewed in the medical record and started on Keflex a Medrol.  He started the antibiotic but has not started the steroid.  He was noted to have pain of the posterior elbow in addition to erythema and warmth.    Symptoms have generally improved over time.  He denies any constitutional symptoms symptoms of fever chills.  Serologic workup was performed which is outlined below.      The symptoms  started without an injury, admittedly he tends to read for pleasure in a prone position with weight distribution over the elbows.. The patient describes a aching pain that does not radiate.  The symptoms are constant  and are are improving since the onset.      Pain localizes to the olecranon elbow and  does not seem to follow a typical epicondylitis provacative pattern.  There are not associated mechanical symptoms. There is not neuritic symptoms about the elbow.The patient denies subjective instability about the elbow and denies new onset or progressive weakness of the upper extremity.    Pain level 5    The patient admits to a pattern of activity related swelling.      Treatment to date: Antibiotics-Keflex    There is nono prior history of elbow trauma.    There is no prior history of autoimmune disease, crystal arthropathy, or crystal arthropathy.      Past Medical History:        Past Medical History:   Diagnosis Date    Allergic rhinitis     Anxiety     Asthma     COPD (chronic obstructive pulmonary disease) (HCC)     Hyperlipidemia     Hypertension     Hypothyroidism     Soft

## 2024-01-25 ENCOUNTER — OFFICE VISIT (OUTPATIENT)
Dept: ORTHOPEDIC SURGERY | Age: 74
End: 2024-01-25
Payer: MEDICARE

## 2024-01-25 DIAGNOSIS — M70.21 OLECRANON BURSITIS OF RIGHT ELBOW: ICD-10-CM

## 2024-01-25 DIAGNOSIS — L03.113 CELLULITIS OF RIGHT ELBOW: Primary | ICD-10-CM

## 2024-01-25 DIAGNOSIS — N18.31 STAGE 3A CHRONIC KIDNEY DISEASE (HCC): ICD-10-CM

## 2024-01-25 PROCEDURE — 1123F ACP DISCUSS/DSCN MKR DOCD: CPT | Performed by: INTERNAL MEDICINE

## 2024-01-25 PROCEDURE — 99213 OFFICE O/P EST LOW 20 MIN: CPT | Performed by: INTERNAL MEDICINE

## 2024-01-25 NOTE — PROGRESS NOTES
Chief Complaint:   Chief Complaint   Patient presents with    Elbow Pain     F/U R ELBOW.  Not completely gone away yet. Pain is not as intense as as it once was. Does not use Heelbo, uses a pillow if necessary. Finished antibiotic but did not take steroid.          History of Present Illness:       Patient is a 73 y.o. male returns follow up for the above complaint. The patient was last seen approximately 10 daysago. The symptoms are improving since the last visit. The patient has had no further testing for the problem.      He completed the course of antibiotics as recommended    No new injuries no new events.  Pain levels 5/10    He denies any constitutional symptoms    He has transitioned from using the elbow sleeve related to discomfort       Past Medical History:        Past Medical History:   Diagnosis Date    Allergic rhinitis     Anxiety     Asthma     COPD (chronic obstructive pulmonary disease) (HCC)     Hyperlipidemia     Hypertension     Hypothyroidism     Soft tissue sarcoma (HCC)         Present Medications:         Current Outpatient Medications   Medication Sig Dispense Refill    diclofenac sodium (VOLTAREN) 1 % GEL Apply 2 to 4 g 3 times daily for 2 weeks then twice daily as needed thereafter 150 g 3    allopurinol (ZYLOPRIM) 100 MG tablet TAKE 1 TABLET BY MOUTH EVERY DAY 90 tablet 1    ALPRAZolam (XANAX) 1 MG tablet TAKE 1/2 TABLET BY MOUTH EVERY DAY AND THEN TAKE 1 TABLET AT BEDTIME 45 tablet 2    atorvastatin (LIPITOR) 40 MG tablet TAKE 1 TABLET BY MOUTH EVERY DAY AT NIGHT 90 tablet 1    levothyroxine (SYNTHROID) 100 MCG tablet TAKE 1 TABLET BY MOUTH EVERY DAY 90 tablet 1    fluticasone (FLONASE) 50 MCG/ACT nasal spray INSTILL 2 SPRAYS IN NOSTRIL EVERY DAY 48 g 1    acetaminophen (TYLENOL) 500 MG tablet Take 1 tablet by mouth 4 times daily as needed for Pain 120 tablet 0    SYMBICORT 160-4.5 MCG/ACT AERO TAKE 2 PUFFS BY MOUTH TWICE A DAY 30.6 each 3    albuterol (PROVENTIL) (2.5 MG/3ML) 0.083%

## 2024-02-29 ENCOUNTER — OFFICE VISIT (OUTPATIENT)
Dept: PRIMARY CARE CLINIC | Age: 74
End: 2024-02-29
Payer: MEDICARE

## 2024-02-29 VITALS
DIASTOLIC BLOOD PRESSURE: 94 MMHG | WEIGHT: 200 LBS | SYSTOLIC BLOOD PRESSURE: 140 MMHG | BODY MASS INDEX: 31.32 KG/M2 | HEART RATE: 63 BPM | OXYGEN SATURATION: 97 %

## 2024-02-29 DIAGNOSIS — J44.9 CHRONIC OBSTRUCTIVE PULMONARY DISEASE, UNSPECIFIED COPD TYPE (HCC): ICD-10-CM

## 2024-02-29 DIAGNOSIS — E78.2 MIXED HYPERLIPIDEMIA: ICD-10-CM

## 2024-02-29 DIAGNOSIS — N18.30 STAGE 3 CHRONIC KIDNEY DISEASE, UNSPECIFIED WHETHER STAGE 3A OR 3B CKD (HCC): ICD-10-CM

## 2024-02-29 DIAGNOSIS — I50.22 CHRONIC SYSTOLIC HEART FAILURE (HCC): ICD-10-CM

## 2024-02-29 DIAGNOSIS — R73.03 PREDIABETES: ICD-10-CM

## 2024-02-29 DIAGNOSIS — F41.1 GENERALIZED ANXIETY DISORDER: ICD-10-CM

## 2024-02-29 DIAGNOSIS — Z12.11 COLON CANCER SCREENING: ICD-10-CM

## 2024-02-29 DIAGNOSIS — E03.9 ACQUIRED HYPOTHYROIDISM: ICD-10-CM

## 2024-02-29 DIAGNOSIS — I10 ESSENTIAL HYPERTENSION: Primary | ICD-10-CM

## 2024-02-29 DIAGNOSIS — Z12.5 SCREENING PSA (PROSTATE SPECIFIC ANTIGEN): ICD-10-CM

## 2024-02-29 PROCEDURE — 99214 OFFICE O/P EST MOD 30 MIN: CPT | Performed by: INTERNAL MEDICINE

## 2024-02-29 PROCEDURE — 3080F DIAST BP >= 90 MM HG: CPT | Performed by: INTERNAL MEDICINE

## 2024-02-29 PROCEDURE — 1123F ACP DISCUSS/DSCN MKR DOCD: CPT | Performed by: INTERNAL MEDICINE

## 2024-02-29 PROCEDURE — 3077F SYST BP >= 140 MM HG: CPT | Performed by: INTERNAL MEDICINE

## 2024-02-29 RX ORDER — CARVEDILOL 12.5 MG/1
12.5 TABLET ORAL 2 TIMES DAILY
Qty: 180 TABLET | Refills: 0 | Status: SHIPPED | OUTPATIENT
Start: 2024-02-29

## 2024-02-29 SDOH — ECONOMIC STABILITY: FOOD INSECURITY: WITHIN THE PAST 12 MONTHS, THE FOOD YOU BOUGHT JUST DIDN'T LAST AND YOU DIDN'T HAVE MONEY TO GET MORE.: NEVER TRUE

## 2024-02-29 SDOH — ECONOMIC STABILITY: FOOD INSECURITY: WITHIN THE PAST 12 MONTHS, YOU WORRIED THAT YOUR FOOD WOULD RUN OUT BEFORE YOU GOT MONEY TO BUY MORE.: NEVER TRUE

## 2024-02-29 SDOH — ECONOMIC STABILITY: INCOME INSECURITY: HOW HARD IS IT FOR YOU TO PAY FOR THE VERY BASICS LIKE FOOD, HOUSING, MEDICAL CARE, AND HEATING?: NOT HARD AT ALL

## 2024-02-29 NOTE — PROGRESS NOTES
Date of Visit: 2024    Amilcar Arredondo (:  1950) is a 73 y.o. male,  Established patient here for evaluation of the following chief complaint(s):  Anxiety, Hypertension, and COPD      ASSESSMENT/PLAN:    1. Essential hypertension  -Not controlled  -Increase carvedilol (COREG) 12.5 MG tablet; Take 1 tablet by mouth 2 times daily  Dispense: 180 tablet; Refill: 0  -Low sodium diet  -Regular aerobic exercise   -Comprehensive Metabolic Panel; Future    2. Mixed hyperlipidemia  -Stable  -Continue Atorvastatin 40mg nightly  -Low fat, low cholesterol diet  -Regular aerobic exercise  -Lipid Panel; Future  -Comprehensive Metabolic Panel; Future    3. Generalized anxiety disorder  -Same and stable  -Continue Xanax 1 mg 1 tablet nightly and 1/2 tablet every day  -Patient declines SSRIs for anxiety  -relaxation techniques    4. Acquired hypothyroidism  -stable  -Continue Levothyroxine 100mcg once daily  -TSH; Future    5. Prediabetes  -Stable  -Low carbohydrate diet  -Regular aerobic exercise  -Hemoglobin A1C; Future    6. Chronic obstructive pulmonary disease, unspecified COPD type (HCC)  -stable  -Continue Symbicort 160-4.5 mcg HFA inhaler 2 puffs twice daily   -Continue ProAir HFA inhaler as needed    7. Chronic systolic heart failure (HCC)  -Stable  -Continue Entresto, torsemide, and spironolactone prescribed by Cardiology  -Low sodium diet  -Continue care per Cardiology    8. Stage 3 chronic kidney disease, unspecified whether stage 3a or 3b CKD (HCC)  -Stable  -Avoid NSAIDs  -Comprehensive Metabolic Panel; Future    9. Colon cancer screening  -Referral to Eliza Saldivar MD, Gastroenterology, [facility] for screening colonoscopy     10. Screening PSA (prostate specific antigen)  - PSA Screening; Future        Return in about 3 weeks (around 3/21/2024) for hypertension.    SUBJECTIVE:    Patient has hypertension. Patient takes carvedilol 6.25 mg 2 times daily. Patient decreases salt. Patient

## 2024-03-08 ASSESSMENT — ENCOUNTER SYMPTOMS
RHINORRHEA: 0
CONSTIPATION: 0
SHORTNESS OF BREATH: 0
COUGH: 0
ABDOMINAL PAIN: 0
SORE THROAT: 0
DIARRHEA: 0
WHEEZING: 0
TROUBLE SWALLOWING: 0
VOMITING: 0
NAUSEA: 0
CHEST TIGHTNESS: 0

## 2024-03-15 DIAGNOSIS — E03.9 ACQUIRED HYPOTHYROIDISM: ICD-10-CM

## 2024-03-15 RX ORDER — LEVOTHYROXINE SODIUM 0.1 MG/1
TABLET ORAL
Qty: 90 TABLET | Refills: 1 | Status: SHIPPED | OUTPATIENT
Start: 2024-03-15

## 2024-03-15 NOTE — TELEPHONE ENCOUNTER
Medication:   Requested Prescriptions     Pending Prescriptions Disp Refills    levothyroxine (SYNTHROID) 100 MCG tablet [Pharmacy Med Name: LEVOTHYROXINE 100 MCG TABLET] 90 tablet 1     Sig: TAKE 1 TABLET BY MOUTH EVERY DAY     Last Filled:  10.19.23    Last appt: 2/29/2024   Next appt: 3/21/2024    Last OARRS:       2/29/2024    11:09 AM   RX Monitoring   Periodic Controlled Substance Monitoring No signs of potential drug abuse or diversion identified.

## 2024-03-18 DIAGNOSIS — R09.81 NASAL CONGESTION: ICD-10-CM

## 2024-03-18 RX ORDER — FLUTICASONE PROPIONATE 50 MCG
SPRAY, SUSPENSION (ML) NASAL
Qty: 48 G | Refills: 1 | Status: SHIPPED | OUTPATIENT
Start: 2024-03-18

## 2024-03-18 NOTE — TELEPHONE ENCOUNTER
Medication:   Requested Prescriptions     Pending Prescriptions Disp Refills    fluticasone (FLONASE) 50 MCG/ACT nasal spray [Pharmacy Med Name: FLUTICASONE PROP 50 MCG SPRAY]  1     Sig: INSTILL 2 SPRAYS IN NOSTRIL EVERY DAY     Last Filled:  9.18.23    Last appt: 2/29/2024   Next appt: 3/21/2024    Last OARRS:       2/29/2024    11:09 AM   RX Monitoring   Periodic Controlled Substance Monitoring No signs of potential drug abuse or diversion identified.

## 2024-03-20 LAB
ESTIMATED AVERAGE GLUCOSE: 128
HBA1C MFR BLD: 6.1 %

## 2024-03-21 ENCOUNTER — OFFICE VISIT (OUTPATIENT)
Dept: PRIMARY CARE CLINIC | Age: 74
End: 2024-03-21
Payer: MEDICARE

## 2024-03-21 VITALS
WEIGHT: 197.2 LBS | SYSTOLIC BLOOD PRESSURE: 134 MMHG | OXYGEN SATURATION: 96 % | DIASTOLIC BLOOD PRESSURE: 86 MMHG | BODY MASS INDEX: 30.89 KG/M2 | HEART RATE: 88 BPM

## 2024-03-21 DIAGNOSIS — F41.1 GENERALIZED ANXIETY DISORDER: ICD-10-CM

## 2024-03-21 DIAGNOSIS — I10 ESSENTIAL HYPERTENSION: Primary | ICD-10-CM

## 2024-03-21 PROCEDURE — 1123F ACP DISCUSS/DSCN MKR DOCD: CPT | Performed by: INTERNAL MEDICINE

## 2024-03-21 PROCEDURE — 3079F DIAST BP 80-89 MM HG: CPT | Performed by: INTERNAL MEDICINE

## 2024-03-21 PROCEDURE — 99213 OFFICE O/P EST LOW 20 MIN: CPT | Performed by: INTERNAL MEDICINE

## 2024-03-21 PROCEDURE — 3075F SYST BP GE 130 - 139MM HG: CPT | Performed by: INTERNAL MEDICINE

## 2024-03-21 RX ORDER — ALPRAZOLAM 1 MG/1
1 TABLET ORAL NIGHTLY PRN
Qty: 45 TABLET | Refills: 2 | Status: CANCELLED | OUTPATIENT
Start: 2024-03-21 | End: 2024-04-20

## 2024-03-21 RX ORDER — ACETAMINOPHEN 500 MG
500 TABLET ORAL 4 TIMES DAILY PRN
Qty: 120 TABLET | Refills: 2 | Status: SHIPPED | OUTPATIENT
Start: 2024-03-21

## 2024-03-21 RX ORDER — RISANKIZUMAB-RZAA 150 MG/ML
INJECTION SUBCUTANEOUS
COMMUNITY
Start: 2024-02-29

## 2024-03-21 RX ORDER — ALPRAZOLAM 1 MG/1
TABLET ORAL
Qty: 45 TABLET | Refills: 2 | Status: SHIPPED | OUTPATIENT
Start: 2024-03-21 | End: 2024-06-18

## 2024-03-21 NOTE — PROGRESS NOTES
11/29/2023 Not Detected     OXAZEPAM 11/29/2023 Not Detected     TEMAZEPAM 11/29/2023 Not Detected     Lorazepam 11/29/2023 Not Detected     Midazolam 11/29/2023 Not Detected     Zolpidem 11/29/2023 Not Detected     Gabapentin 11/29/2023 Not Detected     Pregabalin 11/29/2023 Not Detected     Alpha-OH-Midazolam, Urine 11/29/2023 Not Detected     Barbiturates 11/29/2023 Negative     Ethyl Glucuronide 11/29/2023 PresumptivePOS     Marijuana Metabolite 11/29/2023 Negative     PCP 11/29/2023 Negative     CARISOPRODOL 11/29/2023 Negative     Pain Management Drug Pan* 11/29/2023 See Below     [facility] Drug Panel,* 11/29/2023 See Note    Orders Only on 11/27/2023   Component Date Value    Uric Acid 11/27/2023 5.2    Orders Only on 11/27/2023   Component Date Value    BNP 11/27/2023 244     BASIC METABOLIC PANEL 11/27/2023 NA     Sodium 11/27/2023 139     Potassium 11/27/2023 4.0     Chloride 11/27/2023 102     CO2 11/27/2023 28.6     Anion Gap 11/27/2023 8     Glucose 11/27/2023 104     BUN 11/27/2023 25 (H)     Creatinine 11/27/2023 1.14     Calcium 11/27/2023 9.0     Age Time 11/27/2023 73     GFR Non- 11/27/2023 63     GFR  11/27/2023 76    Office Visit on 10/03/2023   Component Date Value    Dermatology Pathology Re* 10/03/2023 SEE COMMENTS            Medications, Allergies, Social history, Medical history, and results reviewed      An electronic signature was used to authenticate this note.    -Rayo Paz MD

## 2024-03-27 ASSESSMENT — ENCOUNTER SYMPTOMS
NAUSEA: 0
CHEST TIGHTNESS: 0
VOMITING: 0
ABDOMINAL PAIN: 0
SHORTNESS OF BREATH: 0

## 2024-04-23 DIAGNOSIS — J44.9 CHRONIC OBSTRUCTIVE PULMONARY DISEASE, UNSPECIFIED COPD TYPE (HCC): Primary | ICD-10-CM

## 2024-04-23 RX ORDER — FLUTICASONE PROPIONATE AND SALMETEROL 100; 50 UG/1; UG/1
1 POWDER RESPIRATORY (INHALATION) EVERY 12 HOURS
Status: CANCELLED | OUTPATIENT
Start: 2024-04-23

## 2024-04-23 NOTE — TELEPHONE ENCOUNTER
Medication Refill -   Wixela requested ((Insurance no longer cover   SYMBICORT))     Please call:  [employer] - Authorization Dept  Phone# [phone]  Fax# [phone]    Please send to:  [facility]/pharmacy #[id number] - [place], [place] - [address] - P [phone] - F [phone]

## 2024-04-23 NOTE — TELEPHONE ENCOUNTER
Symbicort no longer covered by insurance please send Wixela. Medication pended for correct pharmacy

## 2024-04-24 DIAGNOSIS — M10.00 IDIOPATHIC GOUT, UNSPECIFIED CHRONICITY, UNSPECIFIED SITE: ICD-10-CM

## 2024-04-24 DIAGNOSIS — E79.0 ELEVATED BLOOD URIC ACID LEVEL: ICD-10-CM

## 2024-04-24 DIAGNOSIS — J44.9 CHRONIC OBSTRUCTIVE PULMONARY DISEASE, UNSPECIFIED COPD TYPE (HCC): ICD-10-CM

## 2024-04-24 NOTE — TELEPHONE ENCOUNTER
Medication:   Requested Prescriptions     Pending Prescriptions Disp Refills    allopurinol (ZYLOPRIM) 100 MG tablet [Pharmacy Med Name: ALLOPURINOL 100 MG TABLET] 90 tablet 1     Sig: TAKE 1 TABLET BY MOUTH EVERY DAY    SYMBICORT 160-4.5 MCG/ACT AERO [Pharmacy Med Name: SYMBICORT 160-4.5 MCG INHALER] 30.6 each 3     Sig: TAKE 2 PUFFS BY MOUTH TWICE A DAY     Last Filled:  allopurinol - 1/2/2024  Symbicort - 4/24/2023    Last appt: 3/21/2024   Next appt: 6/21/2024    Last OARRS:       2/29/2024    11:09 AM   RX Monitoring   Periodic Controlled Substance Monitoring No signs of potential drug abuse or diversion identified.

## 2024-04-25 RX ORDER — ALLOPURINOL 100 MG/1
100 TABLET ORAL DAILY
Qty: 90 TABLET | Refills: 1 | Status: SHIPPED | OUTPATIENT
Start: 2024-04-25

## 2024-04-25 RX ORDER — BUDESONIDE AND FORMOTEROL FUMARATE DIHYDRATE 160; 4.5 UG/1; UG/1
AEROSOL RESPIRATORY (INHALATION)
Qty: 30.6 EACH | Refills: 1 | Status: SHIPPED | OUTPATIENT
Start: 2024-04-25

## 2024-04-26 RX ORDER — FLUTICASONE PROPIONATE AND SALMETEROL 250; 50 UG/1; UG/1
1 POWDER RESPIRATORY (INHALATION) EVERY 12 HOURS
Qty: 60 EACH | Refills: 3 | Status: SHIPPED | OUTPATIENT
Start: 2024-04-26

## 2024-04-26 NOTE — TELEPHONE ENCOUNTER
Call patient. His insurance no longer covers Symbicort inhaler. I changed Symbicort to Wixela inhaler and sent a prescription to his pharmacy.

## 2024-05-07 DIAGNOSIS — J44.9 CHRONIC OBSTRUCTIVE PULMONARY DISEASE, UNSPECIFIED COPD TYPE (HCC): ICD-10-CM

## 2024-05-07 RX ORDER — ALBUTEROL SULFATE 2.5 MG/3ML
2.5 SOLUTION RESPIRATORY (INHALATION) EVERY 6 HOURS PRN
Qty: 225 ML | Refills: 2 | Status: SHIPPED | OUTPATIENT
Start: 2024-05-07

## 2024-05-07 NOTE — TELEPHONE ENCOUNTER
Medication:   Requested Prescriptions     Pending Prescriptions Disp Refills    albuterol (PROVENTIL) (2.5 MG/3ML) 0.083% nebulizer solution [Pharmacy Med Name: ALBUTEROL SUL 2.5 MG/3 ML SOLN] 225 mL 2     Sig: TAKE 3 MLS BY NEBULIZATION EVERY 6 HOURS AS NEEDED FOR WHEEZING OR SHORTNESS OF BREATH     Last Filled:  02/06/2023    Last appt: 3/21/2024   Next appt: 6/21/2024    Last OARRS:       2/29/2024    11:09 AM   RX Monitoring   Periodic Controlled Substance Monitoring No signs of potential drug abuse or diversion identified.

## 2024-05-19 DIAGNOSIS — E78.2 MIXED HYPERLIPIDEMIA: ICD-10-CM

## 2024-05-20 RX ORDER — ATORVASTATIN CALCIUM 40 MG/1
TABLET, FILM COATED ORAL
Qty: 90 TABLET | Refills: 1 | Status: SHIPPED | OUTPATIENT
Start: 2024-05-20

## 2024-05-20 NOTE — TELEPHONE ENCOUNTER
Medication:   Requested Prescriptions     Pending Prescriptions Disp Refills    atorvastatin (LIPITOR) 40 MG tablet [Pharmacy Med Name: ATORVASTATIN 40 MG TABLET] 90 tablet 1     Sig: TAKE 1 TABLET BY MOUTH EVERY DAY AT NIGHT     Last Filled:  11.14.23    Last appt: 2/29/24   Next appt: 6/21/2024    Last Lipid:   Lab Results   Component Value Date/Time    CHOL 166 05/30/2023 09:19 AM    CHOL 158 11/11/2022 08:14 AM    TRIG 149 05/30/2023 09:19 AM    HDL 43 05/30/2023 09:19 AM

## 2024-06-06 ENCOUNTER — PATIENT MESSAGE (OUTPATIENT)
Dept: PRIMARY CARE CLINIC | Age: 74
End: 2024-06-06

## 2024-06-21 ENCOUNTER — OFFICE VISIT (OUTPATIENT)
Dept: PRIMARY CARE CLINIC | Age: 74
End: 2024-06-21

## 2024-06-21 VITALS
SYSTOLIC BLOOD PRESSURE: 136 MMHG | DIASTOLIC BLOOD PRESSURE: 86 MMHG | WEIGHT: 200 LBS | HEART RATE: 92 BPM | BODY MASS INDEX: 31.32 KG/M2 | OXYGEN SATURATION: 95 %

## 2024-06-21 DIAGNOSIS — E79.0 ELEVATED BLOOD URIC ACID LEVEL: ICD-10-CM

## 2024-06-21 DIAGNOSIS — R73.03 PREDIABETES: ICD-10-CM

## 2024-06-21 DIAGNOSIS — E03.9 ACQUIRED HYPOTHYROIDISM: ICD-10-CM

## 2024-06-21 DIAGNOSIS — E78.2 MIXED HYPERLIPIDEMIA: ICD-10-CM

## 2024-06-21 DIAGNOSIS — M10.00 IDIOPATHIC GOUT, UNSPECIFIED CHRONICITY, UNSPECIFIED SITE: ICD-10-CM

## 2024-06-21 DIAGNOSIS — F41.1 GENERALIZED ANXIETY DISORDER: ICD-10-CM

## 2024-06-21 DIAGNOSIS — I10 ESSENTIAL HYPERTENSION: Primary | ICD-10-CM

## 2024-06-21 DIAGNOSIS — N18.30 STAGE 3 CHRONIC KIDNEY DISEASE, UNSPECIFIED WHETHER STAGE 3A OR 3B CKD (HCC): ICD-10-CM

## 2024-06-21 DIAGNOSIS — J44.9 CHRONIC OBSTRUCTIVE PULMONARY DISEASE, UNSPECIFIED COPD TYPE (HCC): ICD-10-CM

## 2024-06-21 RX ORDER — ALPRAZOLAM 1 MG/1
TABLET ORAL
Qty: 45 TABLET | Refills: 2 | Status: SHIPPED | OUTPATIENT
Start: 2024-06-21 | End: 2024-09-18

## 2024-06-21 RX ORDER — LEVOTHYROXINE SODIUM 0.1 MG/1
100 TABLET ORAL DAILY
Qty: 90 TABLET | Refills: 1 | Status: SHIPPED | OUTPATIENT
Start: 2024-06-21

## 2024-06-21 RX ORDER — ALBUTEROL SULFATE 90 UG/1
2 AEROSOL, METERED RESPIRATORY (INHALATION) EVERY 6 HOURS PRN
Qty: 1 EACH | Refills: 5 | Status: SHIPPED | OUTPATIENT
Start: 2024-06-21

## 2024-06-21 NOTE — PATIENT INSTRUCTIONS
-Low fat, low cholesterol diet  -Low sodium diet  -low carbohydrate diet  -Regular aerobic exercise

## 2024-06-21 NOTE — PROGRESS NOTES
Date of Visit: 2024    Amilcar Arredondo (:  1950) is a 73 y.o. male,  Established patient here for evaluation of the following chief complaint(s):  Hypertension, Cholesterol Problem, Hypothyroidism, Anxiety, Gout, Elevated uric acid, and COPD      ASSESSMENT/PLAN:    1. Essential hypertension  -stable  -Continue carvedilol 12.5 mg 2 times daily  -Low sodium diet  -Regular aerobic exercise    2. Generalized anxiety disorder  -stable  - Continue ALPRAZolam (XANAX) 1 MG tablet; TAKE 1/2 TABLET BY MOUTH EVERY DAY AND THEN TAKE 1 TABLET AT BEDTIME  Dispense: 45 tablet; Refill: 2    3. Chronic obstructive pulmonary disease, unspecified COPD type (HCC)  -stable  -Continue albuterol sulfate HFA (PROAIR HFA) 108 (90 Base) MCG/ACT inhaler; Inhale 2 puffs into the lungs every 6 hours as needed for Wheezing or Shortness of Breath  Dispense: 1 each; Refill: 5  -Continue Wixela inhaler 250-50mcg 2 puffs twice daily  -work on smoking cessation    4. Acquired hypothyroidism  -stable  -Continue levothyroxine (SYNTHROID) 100 MCG tablet; Take 1 tablet by mouth daily  Dispense: 90 tablet; Refill: 1    5. Mixed hyperlipidemia  -stable  -Continue Atorvastatin 40mg nightly  -Low fat, low cholesterol diet  -Try Mediterranean diet  -Regular aerobic exercise    6. Prediabetes  -stable  -Low carbohydrate diet  -Regular aerobic exercise    7. Idiopathic gout, unspecified chronicity, unspecified site  -stable  -no recent gout attacks  -Continue Allopurinol 100mg once daily    8. Elevated blood uric acid level  -stable  -Continue Allopurinol 100mg once daily    9. Stage 3 chronic kidney disease, unspecified whether stage 3a or 3b CKD (HCC)  -stable  -Avoid NSAID's such as otc Ibuprofen, Advil, Motrin, Naprosyn, and Aleve as well as prescription NSAID's         Return in about 3 months (around 2024) for Medicare AW.    SUBJECTIVE:    Patient has hypertension. Patient takes carvedilol 12.5 mg 2 times daily. Patient decreases

## 2024-07-03 ASSESSMENT — ENCOUNTER SYMPTOMS
NAUSEA: 0
WHEEZING: 0
DIARRHEA: 0
CHEST TIGHTNESS: 0
COUGH: 0
TROUBLE SWALLOWING: 0
RHINORRHEA: 0
ABDOMINAL PAIN: 0
SORE THROAT: 0
SHORTNESS OF BREATH: 1
CONSTIPATION: 0
VOMITING: 0

## 2024-08-05 DIAGNOSIS — I10 ESSENTIAL HYPERTENSION: ICD-10-CM

## 2024-08-05 RX ORDER — CARVEDILOL 12.5 MG/1
12.5 TABLET ORAL 2 TIMES DAILY
Qty: 60 TABLET | Refills: 0 | Status: SHIPPED | OUTPATIENT
Start: 2024-08-05

## 2024-08-05 NOTE — TELEPHONE ENCOUNTER
Medication:   Requested Prescriptions     Pending Prescriptions Disp Refills    carvedilol (COREG) 12.5 MG tablet [Pharmacy Med Name: CARVEDILOL 12.5 MG TABLET] 180 tablet 0     Sig: TAKE 1 TABLET BY MOUTH TWICE A DAY     Last Filled:  2/29/24    Last appt: 6/21/2024   Next appt: 9/17/2024    Last OARRS:       6/21/2024    11:18 AM   RX Monitoring   Periodic Controlled Substance Monitoring No signs of potential drug abuse or diversion identified.

## 2024-08-20 NOTE — PROGRESS NOTES
[facility]  [address] [facility] [address]  [place] [address]  Phone:[phone]  Fax: [phone]   New Patient Consultation    Patient Name: Edu Al  : 1966  MRN:  [id number]  Today: 2024   chief complaint: Headache    PCP: Vikram Prince MD.       Assessment:   Edu Al is a 57 y.o. right handed female with a PMHx of (per chart review): neck pain (cervical spinal changes), DM, HA, GERD, HTN, GI concerns and depression    whom presents solo at the request of ENT for HA. Appears to have chronic migraines, possibly worsened by known cervical neck changes and rebound cephalgia from medication overuse.       Review:    ICD-10-CM ICD-9-CM   1. Chronic migraine without aura without status migrainosus, not intractable  G43.709 346.70   2. Nonintractable headache, unspecified chronicity pattern, unspecified headache type  R51.9 784.0     #2 was referral diagnosis     Plan:   Discussed realistic goals of care with patient at length. Discussed medication options, need for lifestyle adjustment. Discussed treatment will take time. Goal will be to reduce frequency/intensity/quantity of HA, not to be completely HA free. Gave copy of [facility] triggers for migraine informational sheet (N.b., a standard I give to patients who come to seek my care in HA clinic, regardless if they have migraines or not) and discussed clinic's non narcotic policy re: HA. Patient voiced understanding and agreement.               -will have patient work on lifestyle           -we discussed imaging study (I offered MRI), for now, she would like to hold off     For HA Prevention:  Offered botox for migraines vs cGRP, chose cGRP  Wrote for qulipta 30 mg (noted has some intermittent GI concerns including constipation. Wrote for the 30 mg dose, 1/2 life is approx 24 hours). She agreed to try     For HA :  Limit OTC to <3 days use in week     To break up Headaches:  No steroids  Jannette Bob   Date ofBirth:  1950    Date of Visit:  12/16/2021    Chief Complaint   Patient presents with    Fatigue    Headache       HPI  Patient states his fatigue is better. Patient states he has been walking for 36 minutes 2 times per week and has given up all sweets for at least 1 month. Patient states he has lost 4-5 lbs. Patient states he has a lot more energy. Patient states he has been eating healthy salads and fish. Patient states he eats very little potatoes. Patient states his headaches are not better. Patient states headaches are coming from his neck pain. Patient is seeing Orthopedics. Headaches start right neck and radiates up to right side of head. Headache happen anytime. Patient states he is supposed to do Physical Therapy. Patient states he has a steroid pack he hasn't started yet. Patient states he only took a couple of the Tizanidine. Patient states Tylenol doesn't help. Patient states his headaches throb. Patient states his headaches may dissipate with a cup of coffee. Review of Systems   Constitutional: Negative for fatigue. Eyes: Negative for visual disturbance. Respiratory: Negative for chest tightness and shortness of breath. Cardiovascular: Negative for chest pain, palpitations and leg swelling. Genitourinary: Negative for frequency and hematuria. Musculoskeletal: Positive for neck pain. Neurological: Positive for headaches. Negative for dizziness, syncope and light-headedness.        Allergies   Allergen Reactions    Latex Rash    Contrast [Gadolinium Derivatives] Shortness Of Breath and Anxiety    Soap Rash     Outpatient Medications Marked as Taking for the 12/16/21 encounter (Office Visit) with Dallas Miranda MD   Medication Sig Dispense Refill    Multiple Vitamins-Minerals (CENTRUM SILVER 50+MEN) TABS Take by mouth      Coenzyme Q10 (COQ10 PO) Take by mouth      colchicine (COLCRYS) 0.6 MG tablet Take 2 tablets once then repeat 1 d/t PMHx of DM     Other:  We discussed POTS, she is hydrating and followed by other providers           All test results as well as any necessary instructions were reviewed and discussed with patient.    Review:  Orders Placed This Encounter    atogepant (QULIPTA) 30 mg Tab         Patient to return to PCP/other specialists for all other problems  Patient to continue on all medications as Rx'd   A detailed AVS was provided to the patient with patient readback   RTO- 2 months, she expressed that she would like to self schedule   The patient indicates understanding of these issues and agrees to the plan.    HPI:   Edu Al is a 57 y.o.right handed, female with a PMHx of (per chart review): neck pain (cervical spinal changes), DM, HA, GERD, HTN, GI concerns and depression    whom presents solo at the request of ENT for HA.          HA HPI:  Start:HA since HS, daily HA for many years, seeing ENT for a different concern and was suggested she see neurology   History of trauma (concussions in past, 10 years old, hit head in high school gym class, fell down waterfall, fell down stairs in 2006/hit head), History of CNS infection (no), History of Stroke (no)  Location:band like (over eyebrow) and in back of head/sides of head (in deep right ear)   Severity: range: 1-6/10  Duration:All day long   Frequency:25 HD a month /30     Associated factors (bolded positive) WITH HA ( or migraine): Nausea, vomiting, photophobia, vertigo (rare), phonophobia, tinnitus, scalp pain, vision loss, diplopia, scintillations, eye pain, jaw pain, weakness?    Tried:ibuprofen near daily   Triggers (in bold): stress, translating [place]ian,  lack of sleep, too much caffeine, too little caffeine, weather change, seasonal change, strong odours, bright lights, sunlight, food       Currently having a HA?:yes   Positives in bold: Hx of Kidney Stones, asthma, GI bleed, osteoporosis, CAD/MI, CVA/TIA, DM    Imaging on file: none of brain, C  tablet 1 hour later once 15 tablet 1    albuterol (PROVENTIL) (2.5 MG/3ML) 0.083% nebulizer solution Take 3 mLs by nebulization every 6 hours as needed for Wheezing or Shortness of Breath 75 each 2    SYMBICORT 160-4.5 MCG/ACT AERO TAKE 2 PUFFS BY MOUTH TWICE A DAY 10.2 each 3    atorvastatin (LIPITOR) 40 MG tablet TAKE 1 TABLET BY MOUTH EVERY DAY AT NIGHT 90 tablet 1    acetaminophen (TYLENOL) 500 MG tablet Take 2 tablets by mouth 3 times daily as needed for Pain      levothyroxine (SYNTHROID) 100 MCG tablet TAKE 1 TABLET BY MOUTH EVERY DAY 90 tablet 1    desoximetasone (TOPICORT) 0.25 % cream APPLY TO AFFECTED AREA(S) TWO TIMES A DAY FOR 2 WEEKS OR UNTIL IMPROVED 60 g 2    ALPRAZolam (XANAX) 1 MG tablet TAKE 1/2 TABLET ONCE A DAY AND ONE TABLET AT NIGHT 45 tablet 0    albuterol sulfate HFA (PROAIR HFA) 108 (90 Base) MCG/ACT inhaler Inhale 2 puffs into the lungs every 6 hours as needed for Wheezing or Shortness of Breath 1 Inhaler 5    allopurinol (ZYLOPRIM) 100 MG tablet Take 1 tablet by mouth daily 30 tablet 3    Blood Pressure Monitoring (BLOOD PRESSURE MONITOR/M CUFF) MISC Use to monitor blood pressure 1 each 0    clobetasol (TEMOVATE) 0.05 % ointment Apply to affected areas of the skin twice daily until improved. 60 g 2    carvedilol (COREG) 6.25 MG tablet Take 1 tablet by mouth 2 times daily (with meals)      torsemide (DEMADEX) 20 MG tablet Take 2 tablets by mouth daily      losartan (COZAAR) 25 MG tablet Take 25 mg by mouth 2 times daily       spironolactone (ALDACTONE) 25 MG tablet Take 0.5 tablets by mouth daily      potassium chloride (KLOR-CON M) 10 MEQ extended release tablet Take 1 tablet by mouth daily 30 tablet 3         Vitals:    12/16/21 1038   BP: 110/82   Pulse: 93   Resp: 16   Temp: 97.7 °F (36.5 °C)   SpO2: 98%   Weight: 196 lb (88.9 kg)     Body mass index is 30.69 kg/m². Physical Exam  Nursing note reviewed. Constitutional:       General: He is not in acute distress. Appearance: Normal appearance. He is well-developed. Eyes:      Extraocular Movements: Extraocular movements intact. Pupils: Pupils are equal, round, and reactive to light. Neck:      Thyroid: No thyromegaly. Vascular: No carotid bruit. Cardiovascular:      Rate and Rhythm: Normal rate and regular rhythm. Heart sounds: Normal heart sounds, S1 normal and S2 normal. No murmur heard. Pulmonary:      Effort: Pulmonary effort is normal.      Breath sounds: Normal breath sounds. Neurological:      Mental Status: He is alert. No results found for this visit on 12/16/21.   Lab Review   Abstract on 11/15/2021   Component Date Value    Sodium 11/12/2021 144     Chloride 11/12/2021 105     Potassium 11/12/2021 4.8     BUN 11/12/2021 29     CREATININE 11/12/2021 1.43     Glucose 11/12/2021 95     Calcium 11/12/2021 9.6     CO2 11/12/2021 30.3     Gfr Calculated 11/12/2021 49     Anion Gap 11/12/2021 9    Office Visit on 11/10/2021   Component Date Value    Dermatology Pathology Re* 11/10/2021 SEE COMMENTS    Office Visit on 11/04/2021   Component Date Value    BASIC METABOLIC PANEL 71/59/7448 NA     Sodium 11/12/2021 144     Potassium 11/12/2021 4.8     Chloride 11/12/2021 105     CO2 11/12/2021 30.3     Anion Gap 11/12/2021 9     POC Glucose 11/12/2021 95     BUN 11/12/2021 29*    CREATININE 11/12/2021 1.43*    Calcium 11/12/2021 9.6     Age Time 11/12/2021 71     GFR Non- 11/12/2021 49     GFR  11/12/2021 59     BNP 11/12/2021 149    Orders Only on 11/02/2021   Component Date Value    Sodium 11/02/2021 141     Potassium 11/02/2021 5.0     Chloride 11/02/2021 102     CO2 11/02/2021 24     Anion Gap 11/02/2021 15     Glucose 11/02/2021 73     BUN 11/02/2021 29*    CREATININE 11/02/2021 1.3     GFR Non- 11/02/2021 54*    GFR  11/02/2021 >60     Calcium 11/02/2021 9.8     TSH 11/02/2021 1.54 spine done 9/20/2022 (as below)   Therapies tried in past: (failures to be marked, if known---why did it fail?)  Diamox  Flexeril  Zofran  Antivert  Aldactone  Cymbalta  Trazodone  Tramadol  Requip  Promethazine  Mobic  Cozaar  Prinivil   Gabapentin  Lexapro  Wellbutrin  Lisinopril  Morphine          Medication Reconciliation:   Current Outpatient Medications   Medication Sig Dispense Refill    ACCU-CHEK GUIDE TEST STRIPS Strp       acetaZOLAMIDE (DIAMOX) 125 MG Tab Take 1 tablet (125 mg total) by mouth 2 (two) times daily. Note: Use in addition to gradual ascent; start the day before (preferred) or on the day of ascent (Ref). May be discontinued after staying at the same elevation for 2 to 4 days or if descent is initiated for 7 days 14 tablet 0    cetirizine (ZYRTEC) 10 MG tablet Take 10 mg by mouth once daily.      cholecalciferol, vitamin D3, 10 mcg (400 unit) Cap Take 400 Units by mouth once daily.      cyclobenzaprine (FLEXERIL) 5 MG tablet Take 5 mg by mouth 2 (two) times daily as needed.      diphenhydrAMINE (BENADRYL) 25 mg capsule Take 1 capsule (25 mg total) by mouth every 6 (six) hours as needed for Itching. 30 capsule 0    DULoxetine (CYMBALTA) 30 MG capsule Take 30 mg by mouth every evening.       estradioL (ESTRACE) 0.5 MG tablet Take by mouth. Taking once a day      fluticasone propionate (FLONASE) 50 mcg/actuation nasal spray 1 spray (50 mcg total) by Each Nostril route once daily. 16 g 11    Lactobacillus rhamnosus GG (CULTURELLE) 10 billion cell capsule Take 1 capsule by mouth once daily.      METANX, ALGAL OIL, 3 mg-35 mg-2 mg -90.314 mg Cap Take 2 capsules by mouth once daily.       metFORMIN (GLUCOPHAGE) 500 MG tablet Take 500 mg by mouth daily with breakfast.      mupirocin (BACTROBAN) 2 % ointment Apply topically 3 (three) times daily. 15 g 1    ondansetron (ZOFRAN-ODT) 4 MG TbDL Take 1 tablet (4 mg total) by mouth every 6 (six) hours as needed (Nausea). 20 tablet 0    progesterone  (PROMETRIUM) 200 MG capsule Take 1 capsule by mouth once daily.      rosuvastatin (CRESTOR) 10 MG tablet Take 10 mg by mouth nightly.      spironolactone (ALDACTONE) 100 MG tablet Take 100 mg by mouth once daily.      tretinoin (RETIN-A) 0.025 % cream Apply topically every evening. Pea sized amount to entire face. If irritation occurs, decrease use to every other night. 20 g 3    triamcinolone acetonide 0.1% (KENALOG) 0.1 % ointment Apply topically 2 (two) times daily. 80 g 0    albuterol (PROVENTIL HFA) 90 mcg/actuation inhaler Inhale 2 puffs into the lungs every 6 (six) hours as needed for Wheezing. Rescue 18 g 11     No current facility-administered medications for this visit.     Review of patient's allergies indicates:   Allergen Reactions    Lisinopril Swelling    Morphine Itching    Aleve [naproxen sodium] Itching    Ciprofloxacin Itching    Codeine Hives     AFTER 4 DAYS OF TAKING DEVELOPS HIVES    Doxycycline Hives    Tramadol Hives    Amoxicillin Rash    Omnicef [cefdinir] Rash    Secnidazole Itching and Nausea Only    Tylenol [acetaminophen] Rash       PMHx:  Past Medical History:   Diagnosis Date    Allergy     Bulging disc     C6-C7    Depression     Diabetes mellitus     Diabetes mellitus, type 2     Family history of cancer in father 06/18/2020    Family history of cancer in mother 06/18/2020    Headache(784.0)     no longer has.  Started on diabetic meds and they are much better    Hypertension     Mild nonproliferative diabetic retinopathy of both eyes without macular edema 01/23/2024    Otitis media      Past Surgical History:   Procedure Laterality Date    CERVICAL FUSION       C6 C7    cervical steriod injections      COLONOSCOPY      COLONOSCOPY N/A 10/26/2020    Procedure: COLONOSCOPY;  Surgeon: Jude Alonzo MD;  Location: [facility];  Service: Endoscopy;  Laterality: N/A;    DEBRIDEMENT TENNIS ELBOW      DILATION, EUSTACHIAN TUBE, BILATERAL, USING BALLOON Bilateral 4/25/2022    Procedure:   WBC 11/02/2021 9.9     RBC 11/02/2021 5.01     Hemoglobin 11/02/2021 15.5     Hematocrit 11/02/2021 46.9     MCV 11/02/2021 93.6     MCH 11/02/2021 31.0     MCHC 11/02/2021 33.1     RDW 11/02/2021 14.3     Platelets 31/75/1562 211     MPV 11/02/2021 9.6     PLATELET SLIDE REVIEW 11/02/2021 Adequate     SLIDE REVIEW 11/02/2021 see below     Neutrophils % 11/02/2021 76.0     Lymphocytes % 11/02/2021 11.0     Monocytes % 11/02/2021 9.0     Eosinophils % 11/02/2021 3.0     Basophils % 11/02/2021 0.0     Neutrophils Absolute 11/02/2021 7.6     Lymphocytes Absolute 11/02/2021 1.1     Monocytes Absolute 11/02/2021 0.9     Eosinophils Absolute 11/02/2021 0.3     Basophils Absolute 11/02/2021 0.0     Bands Relative 11/02/2021 1    Office Visit on 11/02/2021   Component Date Value    Drugs Expected 11/02/2021 see paper     Pain Management Drug Pan* 11/02/2021 See Note     Creatinine, Ur 11/02/2021 84.5     Codeine 11/02/2021 Not Detected     Morphine 11/02/2021 Not Detected     6-Acetylmorphine 11/02/2021 Not Detected     Oxycodone 11/02/2021 Not Detected     Noroxycodone 11/02/2021 Not Detected     Oxymorphone 11/02/2021 Not Detected     NOROXYMORPHONE, URINE 11/02/2021 Not Detected     Hydrocodone 11/02/2021 Not Detected     NORHYDROCODONE, URINE 11/02/2021 Not Detected     Hydromorphone 11/02/2021 Not Detected     Naloxone 11/02/2021 Not Detected     Buprenorphine 11/02/2021 Not Detected     Norbuprenorphine 11/02/2021 Not Detected     Fentanyl 11/02/2021 Not Detected     Norfentanyl 11/02/2021 Not Detected     Meperidine 11/02/2021 Not Detected     Tapentadol, Urine 11/02/2021 Not Detected     Tapentadol-O-Sulfate, Ur* 11/02/2021 Not Detected     Methadone 11/02/2021 Not Detected     Tramadol 11/02/2021 Not Detected     Amphetamine 11/02/2021 Not Detected     Methamphetamine 11/02/2021 Not Detected     MDMA, Urine 11/02/2021 Not Detected     MDA 11/02/2021 Not Detected DILATION, EUSTACHIAN TUBE, BILATERAL, USING BALLOON;  Surgeon: Rohit Valderrama MD;  Location: [facility] OR;  Service: ENT;  Laterality: Bilateral;    ESOPHAGOGASTRODUODENOSCOPY      HEMORRHOID SURGERY      MYRINGOTOMY WITH INSERTION OF VENTILATION TUBE Left 4/25/2022    Procedure: MYRINGOTOMY WITH INSERTION OF PE TUBES;  Surgeon: Rohit Valderrama MD;  Location: [facility] OR;  Service: ENT;  Laterality: Left;    TRANSFORAMINAL EPIDURAL INJECTION OF STEROID Left 6/7/2018    Procedure: INJECTION-STEROID-EPIDURAL-TRANSFORAMINAL;  Surgeon: Thor Bethea MD;  Location[facility] OR;  Service: Pain Management;  Laterality: Left;  C6-7    TYMPANOSTOMY TUBE PLACEMENT      TYMPANOSTOMY TUBE PLACEMENT  1/2015    VAGINAL DELIVERY      times 3       Fhx:  Family History   Problem Relation Name Age of Onset    Hypertension Mother      Stroke Mother  69    Heart attack Father      Heart disease Father      Diabetes Father      Hypertension Paternal Grandmother         Shx: Genologist, will be traveling to [place] tomorrow   Social History     Socioeconomic History    Marital status:    Tobacco Use    Smoking status: Never    Smokeless tobacco: Never   Substance and Sexual Activity    Alcohol use: Yes     Comment: occasionally    Drug use: No    Sexual activity: Yes     Partners: Male     Birth control/protection: None     Comment: vasectomy     Social Determinants of Health     Financial Resource Strain: Low Risk  (1/23/2024)    Overall Financial Resource Strain (CARDIA)     Difficulty of Paying Living Expenses: Not hard at all   Food Insecurity: No Food Insecurity (1/23/2024)    Hunger Vital Sign     Worried About Running Out of Food in the Last Year: Never true     Ran Out of Food in the Last Year: Never true   Transportation Needs: No Transportation Needs (1/23/2024)    PRAPARE - Transportation     Lack of Transportation (Medical): No     Lack of Transportation (Non-Medical): No   Physical Activity: Sufficiently Active (1/23/2024)      MDEA 11/02/2021 Not Detected     Methylphenidate 11/02/2021 Not Detected     Phentermine 11/02/2021 Not Detected     Benzoylecgonine 11/02/2021 Not Detected     Alprazolam 11/02/2021 Present     Alpha-OH-alprazolam 11/02/2021 Present     Clonazepam 11/02/2021 Not Detected     7-aminoclonazepam 11/02/2021 Not Detected     Diazepam 11/02/2021 Not Detected     NORDIAZEPAM 11/02/2021 Not Detected     OXAZEPAM 11/02/2021 Not Detected     TEMAZEPAM 11/02/2021 Not Detected     Lorazepam 11/02/2021 Not Detected     Midazolam 11/02/2021 Not Detected     Zolpidem 11/02/2021 Not Detected     Gabapentin 11/02/2021 Not Detected     Pregabalin 11/02/2021 Not Detected     Alpha-OH-Midazolam, Urine 11/02/2021 Not Detected     Barbiturates 11/02/2021 Not Detected     Ethyl Glucuronide 11/02/2021 Present     Marijuana Metabolite 11/02/2021 Not Detected     PCP 11/02/2021 Not Detected     CARISOPRODOL 11/02/2021 Not Detected     Pain Management Drug Pan* 11/02/2021 See Below     [facility] Pain Mgt Drug Panel,* 11/02/2021 See Note    Hospital Outpatient Visit on 06/22/2021   Component Date Value    BNP 08/16/2021 136     Thyrotropin Releasing Ho* 08/16/2021 1.82     Uric Acid, Serum 08/16/2021 7.1     LIPID PANEL 08/16/2021 NA     Cholesterol, Total 08/16/2021 161     Triglycerides 08/16/2021 106     HDL 08/16/2021 62     LDL Direct 08/16/2021 78     VLDL 08/16/2021 21     COMPREHENSIVE METABOLIC * 40/78/4060 NA     Sodium 08/16/2021 141     Potassium 08/16/2021 4.6     Chloride 08/16/2021 101     CO2 08/16/2021 31.7     Anion Gap 08/16/2021 8     POC Glucose 08/16/2021 97     BUN 08/16/2021 16     CREATININE 08/16/2021 1.27     Calcium 08/16/2021 9.6     Albumin 08/16/2021 4.0     Total Protein 08/16/2021 7.5     Total Bilirubin 08/16/2021 0.50     Alkaline Phosphatase 08/16/2021 121*    AST 08/16/2021 15*    ALT 08/16/2021 19*    Age Time 08/16/2021 70     GFR Non- 08/16/2021 56     GFR  08/16/2021 68     CBC Auto Dif 08/16/2021 NA     Leukocytes, Bld 08/16/2021 8.9     RBC 08/16/2021 5.18     Hemoglobin 08/16/2021 16.4     Hematocrit 08/16/2021 47.4     MCV 08/16/2021 91.4     MCHC 08/16/2021 31.7     MCHC 08/16/2021 34.6     RDW 08/16/2021 13.9     Platelets 69/39/3730 246     MPV 08/16/2021 8.8     Neutrophils Segmented 08/16/2021 72.2*    Lymphocytes Absolute 08/16/2021 16.4     Monocytes Absolute 08/16/2021 9.3*    Eosinophils Absolute 08/16/2021 1.5     Basophils % 08/16/2021 0.6     Differential, manual 08/16/2021 NOT INDICATED     Absolute Neut # 08/16/2021 6.4     Absolute Lymph # 08/16/2021 1.5     Absolute Mono # 08/16/2021 0.8     Absolute Eos # 08/16/2021 0.1     Absolute Baso # 08/16/2021 0.1     Vitamin B-12 08/16/2021 418     Hemoglobin A1C 08/16/2021 6.0     eAG 08/16/2021 125.5          Assessment/Plan     1. Fatigue, unspecified type  -improved  -multivitamin once daily  -Regular aerobic exercise    2. Nonintractable headache, unspecified chronicity pattern, unspecified headache type  -same  -patient states headaches are due to neck pain and he is seeing Orthopedics for neck pain  -Continue same medications    3. Chronic obstructive pulmonary disease, unspecified COPD type ([address]  - refilled albuterol (PROVENTIL) (2.5 MG/3ML) 0.083% nebulizer solution; Take 3 mLs by nebulization every 6 hours as needed for Wheezing or Shortness of Breath  Dispense: 75 each; Refill: 2    4. Idiopathic gout of foot, unspecified chronicity, unspecified laterality  - refilled colchicine (COLCRYS) 0.6 MG tablet; Take 2 tablets once then repeat 1 tablet 1 hour later once  Dispense: 15 tablet; Refill: 1      Discussed medications with patient, who voiced understanding of their use and indications. All questions answered.     Controlled Substance Monitoring:    Acute and Chronic Pain Monitoring:   RX Monitoring 12/16/2021   Attestation - Exercise Vital Sign     Days of Exercise per Week: 4 days     Minutes of Exercise per Session: 50 min   Stress: Stress Concern Present (2024)    [employer] [place] [employer] - Occupational Stress Questionnaire     Feeling of Stress : Rather much   Housing Stability: Low Risk  (2024)    Housing Stability Vital Sign     Unable to Pay for Housing in the Last Year: No     Number of Places Lived in the Last Year: 1     Unstable Housing in the Last Year: No           Labs:   Lab Results   Component Value Date    HGBA1C 5.7 2024           Imagin2022 MRI C spine (report only/care everywhere tab): Impression    1.   ACDF C6-C7.  2.   There are degenerative changes throughout the cervical spine as above.    Electronically Signed By: Giacomo Churchill 2022 9:03 CDT  Narrative    Technique: Sagittal T1, T2, STIR, axial T1 and T2 weighted images were performed through the cervical spine without intravenous contrast.    Comparison: Report from an outside MRI of the cervical spine 2021    Clinical:  Neck pain, spinal stenosis, prior surgery      Findings:  There has been a previous anterior discectomy and fusion at C6-C7.  There is minimal retrolisthesis of C5 on C6.    The vertebral body heights are preserved. There is no marrow edema.  There is multilevel degenerative spurring with loss of disc height from C4 through C6.    The visualized portion of the spinal cord is normal. The prevertebral soft tissues are normal. The visualized posterior fossa structures are normal.    C2-3: There is no significant disc protrusion or central canal or neuroforaminal stenosis.    C3-4: There is a mild broad-based disc protrusion which does not abut the cord. There is mild right uncovertebral joint hypertrophy causing mild right neuroforaminal stenosis.    C4-5: There is a mild broad-based disc protrusion which does not abut the cord. There is bilateral uncovertebral joint hypertrophy, right  "greater than left with mild right-sided facet hypertrophy. There is mild right and minimal left neuroforaminal stenosis.    C5-6: There is a broad-based disc protrusion which does not abut the cord. There is bilateral uncovertebral joint hypertrophy. There is mild to moderate bilateral neuroforaminal stenosis.    C6-7: There are postsurgical changes. There is left-sided uncovertebral joint hypertrophy. There is mild left neuroforaminal stenosis.    C7-T1: There is no significant disc protrusion or central canal or neuroforaminal stenosis.      Other testing:  Reviewed in chart     Note: I have independently reviewed any/all imaging/labs/tests and agree with the report (s) as documented.  Any discrepancies will be as noted/demarcated by free text.  RICHARD PALUMBO 8/20/2024                     ROS:   Review Of Systems (questions asked, positive or additions in BOLD)  Gen: Weight change, fatigue/malaise, pyrexia   HEENT: Tinnitus, headache,  blurred vision, eye pain, diplopia, photophobia,  nose bleeds, congestion, sore throat, jaw pain, scalp pain, neck stiffness   Card: Palpitations, CP   Pulm: SOB, cough   Vas: Easy bruising, easy bleeding   GI: N/V/D/C, incontinence, hematemesis, hematochezia    : incontinence, hematuria   Endocrine: Temp intolerance, polyuria, polydipsia   M/S: Neck pain, myalgia, back pain, joint pain, falls    Neuro: PER HPI   PSY: Memory loss, confusion, depression, anxiety, trouble in sleep, hallucinations          EXAM:   /84 (BP Location: Right arm, Patient Position: Sitting, BP Method: Medium (Automatic))   Pulse 99   Temp 97.3 °F (36.3 °C) (Temporal)   Resp 17   Ht 5' 5" (1.651 m)   Wt 60.5 kg (133 lb 6.1 oz)   LMP 11/01/2017   BMI 22.20 kg/m²    GEN:  NAD  HEENT: NC/AT, Frontalis was TTP, temporalis was NTTP,  nares patent, dentition appropriate,   neck supple, trachea midline, Occiput and trapezius  TTP     EXTREM: no edema present.  Decreased muscle bulk RUE  NEURO:  Mental " Status:  Awake, alert and appropriately oriented to time, place, and person.  Normal attention and concentration.  Speech is fluent and appropriate language function (I.e., comprehension).     Cranial Nerves:      Pupils are equal and reactive to light.  Extraocular movements are intact and without nystagmus.  Visual fields are full to confrontation testing.   Facial movement is symmetric.  Facial sensation is intact.  Hearing is normal. Uvula in midline. FROM of neck in all (6) directions, shoulder shrug symmetrical. Tongue in midline without fasiculation.     Motor:  RUE:appropriate against gravity and medium force as tested 5/5              LUE: appropriate against gravity and medium force as tested 5/5              RLE:appropriate against gravity and medium force as tested 5/5              LLE: appropriate against gravity and medium force as tested 5/5  Tremor/pronator drift not apparent.     finger extension strength was symmetrical     Sensory:  RUE  intact light touch, proprioception, and temperature  LUE intact light touch, proprioception, and temperature     RLE intact light touch  LLE intact light touch      DTR's:                                            R              L  biceps 1+ 1+         brachioradialis 1+ 1+   Knee jerk 1+ 1+        Coordination:  FTN-WNL.      Gait and Stance: slightly antalgic, walks unassisted         This document has been electronically signed by  Casa DENNYReal Arias MPA, PA-C on 8/20/2024, I have personally typed this message using the EMR.       Dr Catrachito MD was available during today's visit.     Personal Protective Equipment:    Personal Protective Equipment was used during this encounter including  non latex gloves.          CC: Vikram Prince MD/Rohit Valderrama MD (ENT)

## 2024-08-27 DIAGNOSIS — E78.2 MIXED HYPERLIPIDEMIA: ICD-10-CM

## 2024-08-27 NOTE — TELEPHONE ENCOUNTER
Medication:   Requested Prescriptions     Pending Prescriptions Disp Refills    atorvastatin (LIPITOR) 40 MG tablet [Pharmacy Med Name: ATORVASTATIN 40 MG TABLET] 90 tablet 1     Sig: TAKE 1 TABLET BY MOUTH EVERY DAY AT NIGHT     Last Filled:  05/20/2024    Last appt: 6/21/2024   Next appt: 9/17/2024    Last OARRS:       6/21/2024    11:18 AM   RX Monitoring   Periodic Controlled Substance Monitoring No signs of potential drug abuse or diversion identified.

## 2024-08-28 RX ORDER — ATORVASTATIN CALCIUM 40 MG/1
TABLET, FILM COATED ORAL
Qty: 90 TABLET | Refills: 1 | Status: SHIPPED | OUTPATIENT
Start: 2024-08-28

## 2024-09-03 DIAGNOSIS — I10 ESSENTIAL HYPERTENSION: ICD-10-CM

## 2024-09-03 RX ORDER — CARVEDILOL 12.5 MG/1
12.5 TABLET ORAL 2 TIMES DAILY
Qty: 180 TABLET | Refills: 1 | Status: SHIPPED | OUTPATIENT
Start: 2024-09-03

## 2024-09-03 NOTE — TELEPHONE ENCOUNTER
Medication:   Requested Prescriptions     Pending Prescriptions Disp Refills    carvedilol (COREG) 12.5 MG tablet [Pharmacy Med Name: CARVEDILOL 12.5 MG TABLET] 180 tablet 1     Sig: TAKE 1 TABLET BY MOUTH TWICE A DAY     Last Filled:  8.5.24    Last appt: 6/21/2024   Next appt: 9/17/2024  Requesting 90 day    Last OARRS:       6/21/2024    11:18 AM   RX Monitoring   Periodic Controlled Substance Monitoring No signs of potential drug abuse or diversion identified.

## 2024-09-17 ENCOUNTER — OFFICE VISIT (OUTPATIENT)
Dept: PRIMARY CARE CLINIC | Age: 74
End: 2024-09-17

## 2024-09-17 VITALS
DIASTOLIC BLOOD PRESSURE: 86 MMHG | WEIGHT: 213.4 LBS | SYSTOLIC BLOOD PRESSURE: 132 MMHG | OXYGEN SATURATION: 96 % | BODY MASS INDEX: 33.49 KG/M2 | HEIGHT: 67 IN | HEART RATE: 91 BPM

## 2024-09-17 DIAGNOSIS — J44.9 CHRONIC OBSTRUCTIVE PULMONARY DISEASE, UNSPECIFIED COPD TYPE (HCC): ICD-10-CM

## 2024-09-17 DIAGNOSIS — Z00.00 MEDICARE ANNUAL WELLNESS VISIT, SUBSEQUENT: Primary | ICD-10-CM

## 2024-09-17 DIAGNOSIS — N18.30 STAGE 3 CHRONIC KIDNEY DISEASE, UNSPECIFIED WHETHER STAGE 3A OR 3B CKD (HCC): ICD-10-CM

## 2024-09-17 DIAGNOSIS — M10.00 IDIOPATHIC GOUT, UNSPECIFIED CHRONICITY, UNSPECIFIED SITE: ICD-10-CM

## 2024-09-17 DIAGNOSIS — F32.A MILD DEPRESSION: ICD-10-CM

## 2024-09-17 DIAGNOSIS — E78.2 MIXED HYPERLIPIDEMIA: ICD-10-CM

## 2024-09-17 DIAGNOSIS — R73.03 PREDIABETES: ICD-10-CM

## 2024-09-17 DIAGNOSIS — F41.1 GENERALIZED ANXIETY DISORDER: ICD-10-CM

## 2024-09-17 DIAGNOSIS — I10 ESSENTIAL HYPERTENSION: ICD-10-CM

## 2024-09-17 DIAGNOSIS — Z23 NEED FOR INFLUENZA VACCINATION: ICD-10-CM

## 2024-09-17 DIAGNOSIS — E03.9 ACQUIRED HYPOTHYROIDISM: ICD-10-CM

## 2024-09-17 RX ORDER — LEVOTHYROXINE SODIUM 100 UG/1
100 TABLET ORAL DAILY
Qty: 90 TABLET | Refills: 1 | Status: SHIPPED | OUTPATIENT
Start: 2024-09-17

## 2024-09-17 ASSESSMENT — PATIENT HEALTH QUESTIONNAIRE - PHQ9
SUM OF ALL RESPONSES TO PHQ QUESTIONS 1-9: 8
10. IF YOU CHECKED OFF ANY PROBLEMS, HOW DIFFICULT HAVE THESE PROBLEMS MADE IT FOR YOU TO DO YOUR WORK, TAKE CARE OF THINGS AT HOME, OR GET ALONG WITH OTHER PEOPLE: NOT DIFFICULT AT ALL
8. MOVING OR SPEAKING SO SLOWLY THAT OTHER PEOPLE COULD HAVE NOTICED. OR THE OPPOSITE, BEING SO FIGETY OR RESTLESS THAT YOU HAVE BEEN MOVING AROUND A LOT MORE THAN USUAL: NOT AT ALL
SUM OF ALL RESPONSES TO PHQ QUESTIONS 1-9: 8
SUM OF ALL RESPONSES TO PHQ QUESTIONS 1-9: 8
4. FEELING TIRED OR HAVING LITTLE ENERGY: MORE THAN HALF THE DAYS
1. LITTLE INTEREST OR PLEASURE IN DOING THINGS: MORE THAN HALF THE DAYS
5. POOR APPETITE OR OVEREATING: SEVERAL DAYS
9. THOUGHTS THAT YOU WOULD BE BETTER OFF DEAD, OR OF HURTING YOURSELF: NOT AT ALL
3. TROUBLE FALLING OR STAYING ASLEEP: MORE THAN HALF THE DAYS
2. FEELING DOWN, DEPRESSED OR HOPELESS: SEVERAL DAYS
7. TROUBLE CONCENTRATING ON THINGS, SUCH AS READING THE NEWSPAPER OR WATCHING TELEVISION: NOT AT ALL
6. FEELING BAD ABOUT YOURSELF - OR THAT YOU ARE A FAILURE OR HAVE LET YOURSELF OR YOUR FAMILY DOWN: NOT AT ALL
SUM OF ALL RESPONSES TO PHQ QUESTIONS 1-9: 8
SUM OF ALL RESPONSES TO PHQ9 QUESTIONS 1 & 2: 3

## 2024-09-17 ASSESSMENT — LIFESTYLE VARIABLES
HOW MANY STANDARD DRINKS CONTAINING ALCOHOL DO YOU HAVE ON A TYPICAL DAY: 1 OR 2
HOW OFTEN DO YOU HAVE A DRINK CONTAINING ALCOHOL: 2-3 TIMES A WEEK

## 2024-09-22 DIAGNOSIS — J44.9 CHRONIC OBSTRUCTIVE PULMONARY DISEASE, UNSPECIFIED COPD TYPE (HCC): ICD-10-CM

## 2024-09-23 DIAGNOSIS — F41.1 GENERALIZED ANXIETY DISORDER: ICD-10-CM

## 2024-09-23 RX ORDER — FLUTICASONE PROPIONATE AND SALMETEROL 250; 50 UG/1; UG/1
POWDER RESPIRATORY (INHALATION)
Qty: 60 EACH | Refills: 3 | Status: SHIPPED | OUTPATIENT
Start: 2024-09-23

## 2024-09-23 RX ORDER — ALPRAZOLAM 1 MG
TABLET ORAL
Qty: 45 TABLET | Refills: 2 | Status: SHIPPED | OUTPATIENT
Start: 2024-09-23 | End: 2024-12-22

## 2024-09-26 PROBLEM — Z23 NEED FOR INFLUENZA VACCINATION: Status: ACTIVE | Noted: 2024-09-26

## 2024-09-26 PROBLEM — Z00.00 MEDICARE ANNUAL WELLNESS VISIT, SUBSEQUENT: Status: ACTIVE | Noted: 2024-09-26

## 2024-09-26 PROBLEM — F32.A MILD DEPRESSION: Status: ACTIVE | Noted: 2024-09-26

## 2024-10-18 NOTE — PROGRESS NOTES
[facility]  Kati Leong MD  [address]  1950    79 y.o. male     Date of Visit: 5/4/2021    Chief Complaint: dermatitis    History of Present Illness:    1Marika Paz returns today to follow-up for chronic lichenified dermatitis. He had a persistent patch on the right lateral thigh at last visit that cleared with intralesional Kenalog. He complains of new areas on the right thigh today in addition to a few new areas on the abdomen and left forearm. He is using CeraVe. He is not using any topical steroids currently. Review of Systems:  Gen: Feels well, good sense of health. Past Medical History, Family History, Surgical History, Medications and Allergies reviewed. Past Medical History:   Diagnosis Date    Allergic rhinitis     Anxiety     Asthma     [facility]) [facility] [address]     Hyperlipidemia     Hypertension     Hypothyroidism     Soft tissue sarcoma ([facility] [address]      Past Surgical History:   Procedure Laterality Date    COLONOSCOPY  8./16/2007    BN - 10 year f/u    SINUS SURGERY      SKIN CANCER EXCISION         Allergies   Allergen Reactions    Latex Rash    Contrast [Gadolinium Derivatives] Shortness Of Breath and Anxiety    Soap Rash     Outpatient Medications Marked as Taking for the 5/4/21 encounter (Office Visit) with Beau Miller MD   Medication Sig Dispense Refill    clobetasol (TEMOVATE) 0.05 % ointment Apply to affected areas of the skin twice daily until improved.  60 g 2    levothyroxine (SYNTHROID) 100 MCG tablet TAKE 1 TABLET BY MOUTH EVERY DAY 90 tablet 1    ALPRAZolam (XANAX) 1 MG tablet TAKE 1/2 TABLET ONCE A DAY AND ONE TABLET AT NIGHT 45 tablet 2    SYMBICORT 160-4.5 MCG/ACT AERO TAKE 2 PUFFS BY MOUTH TWICE A DAY 10.2 Inhaler 3    diclofenac sodium (VOLTAREN) 1 % GEL APPLY 2 G TOPICALLY 2 TIMES DAILY 100 g 3    atorvastatin (LIPITOR) 40 MG tablet TAKE 1 TABLET BY MOUTH EVERY DAY AT NIGHT 30 tablet 5    colchicine (COLCRYS) 0.6 MG tablet Take 1 tablet by mouth daily PRN      carvedilol (COREG) 6.25 MG tablet Take 1 tablet by mouth 2 times daily (with meals)      torsemide (DEMADEX) 20 MG tablet Take 2 tablets by mouth daily      losartan (COZAAR) 25 MG tablet Take 50 mg by mouth daily       spironolactone (ALDACTONE) 25 MG tablet Take 0.5 tablets by mouth daily      potassium chloride (KLOR-CON M) 10 MEQ extended release tablet Take 1 tablet by mouth daily 30 tablet 3    albuterol (PROVENTIL) (2.5 MG/3ML) 0.083% nebulizer solution Take 2.5 mg by nebulization every 6 hours as needed for Wheezing or Shortness of Breath      nicotine (NICODERM CQ) 21 MG/24HR Place 1 patch onto the skin every 24 hours PRN      Multiple Vitamins-Minerals (CENTRUM SILVER ADULT 50+ PO) Take 1 tablet by mouth daily      aspirin 81 MG tablet Take 1 tablet by mouth daily. 30 tablet 5    albuterol (PROAIR HFA) 108 (90 BASE) MCG/ACT inhaler Inhale 2 puffs into the lungs every 6 hours as needed. Physical Examination     Well-appearing. 1.  Right posterior lateral thigh and left abdomen with lichenified pink plaques. Left forearm with a scaly erythematous patch. Medial portions of the thighs with faintly erythematous scaly patches. Assessment and Plan     1. Chronic dermatitis with lichenification - few new plaques, other areas improved    Intralesional Kenalog 5 mg/mL - 2 mL was injected into 2 plaques on the abdomen and right thigh. Clobetasol ointment twice daily until improved. Continue daily use of emollients. Return in about 3 months (around 8/4/2021).     --Eros Lacey MD Renal bx prelim reviewed, extensive multidisciplinary discussion amongst all his current and OP medical providers on next steps. Rheum plan as above, HemeOnc to trial steroids and will observe counts and hematoma.

## 2024-10-26 PROBLEM — Z23 NEED FOR INFLUENZA VACCINATION: Status: RESOLVED | Noted: 2024-09-26 | Resolved: 2024-10-26

## 2024-10-26 PROBLEM — Z00.00 MEDICARE ANNUAL WELLNESS VISIT, SUBSEQUENT: Status: RESOLVED | Noted: 2024-09-26 | Resolved: 2024-10-26

## 2024-12-16 LAB
ALBUMIN: 4.5 G/DL (ref 3.2–4.8)
ALP BLD-CCNC: 106 IU/L (ref 46–116)
ALT SERPL-CCNC: 20 IU/L (ref 10–49)
ANION GAP SERPL CALCULATED.3IONS-SCNC: 3 MMOL/L (ref 5–15)
AST SERPL-CCNC: 29 IU/L (ref 0–33)
BILIRUB SERPL-MCNC: 0.6 MG/DL (ref 0.2–1.1)
BUN BLDV-MCNC: 42 MG/DL (ref 9–23)
CALCIUM SERPL-MCNC: 10.3 MG/DL (ref 8.7–10.4)
CHLORIDE BLD-SCNC: 109 MMOL/L (ref 98–107)
CHOLESTEROL, TOTAL: 172 MG/DL
CO2: 29 MMOL/L (ref 20–31)
CREAT SERPL-MCNC: 1.91 MG/DL (ref 0.7–1.3)
ESTIMATED AVERAGE GLUCOSE: 128 MG/DL
GFR, ESTIMATED: 36 ML/MIN/1.73 M2
GLUCOSE BLD-MCNC: 109 MG/DL (ref 74–106)
HBA1C MFR BLD: 6.1 % (ref 3.8–5.6)
HDLC SERPL-MCNC: 68 MG/DL (ref 40–100)
LDL CHOLESTEROL: 69 MG/DL
NONHDLC SERPL-MCNC: 104 MG/DL
POTASSIUM SERPL-SCNC: 4.4 MMOL/L (ref 3.5–5.1)
SODIUM BLD-SCNC: 141 MMOL/L (ref 136–145)
TOTAL PROTEIN: 6.9 G/DL (ref 5.7–8.2)
TRIGL SERPL-MCNC: 175 MG/DL
TSH ULTRASENSITIVE: 3.15 MCIU/ML (ref 0.55–4.78)
URIC ACID: 8.3 MG/DL (ref 3.7–9.2)

## 2024-12-17 ENCOUNTER — OFFICE VISIT (OUTPATIENT)
Dept: PRIMARY CARE CLINIC | Age: 74
End: 2024-12-17

## 2024-12-17 VITALS
BODY MASS INDEX: 32.42 KG/M2 | WEIGHT: 207 LBS | SYSTOLIC BLOOD PRESSURE: 138 MMHG | DIASTOLIC BLOOD PRESSURE: 86 MMHG | HEART RATE: 59 BPM | OXYGEN SATURATION: 94 %

## 2024-12-17 DIAGNOSIS — I10 ESSENTIAL HYPERTENSION: ICD-10-CM

## 2024-12-17 DIAGNOSIS — N18.32 STAGE 3B CHRONIC KIDNEY DISEASE (HCC): ICD-10-CM

## 2024-12-17 DIAGNOSIS — F41.1 GENERALIZED ANXIETY DISORDER: Primary | ICD-10-CM

## 2024-12-17 DIAGNOSIS — Z79.899 MEDICATION MANAGEMENT: ICD-10-CM

## 2024-12-17 DIAGNOSIS — I50.22 CHRONIC SYSTOLIC HEART FAILURE (HCC): ICD-10-CM

## 2024-12-17 DIAGNOSIS — J44.9 CHRONIC OBSTRUCTIVE PULMONARY DISEASE, UNSPECIFIED COPD TYPE (HCC): ICD-10-CM

## 2024-12-17 PROBLEM — F13.29 SEDATIVE, HYPNOTIC OR ANXIOLYTIC DEPENDENCE WITH UNSPECIFIED SEDATIVE, HYPNOTIC OR ANXIOLYTIC-INDUCED DISORDER (HCC): Status: RESOLVED | Noted: 2023-03-09 | Resolved: 2024-12-17

## 2024-12-17 PROBLEM — F13.20 SEDATIVE, HYPNOTIC OR ANXIOLYTIC DEPENDENCE, UNCOMPLICATED (HCC): Status: RESOLVED | Noted: 2023-03-09 | Resolved: 2024-12-17

## 2024-12-17 PROBLEM — F13.99 SEDATIVE, HYPNOTIC OR ANXIOLYTIC USE, UNSPECIFIED WITH UNSPECIFIED SEDATIVE, HYPNOTIC OR ANXIOLYTIC-INDUCED DISORDER (HCC): Status: RESOLVED | Noted: 2023-03-09 | Resolved: 2024-12-17

## 2024-12-17 RX ORDER — FLUTICASONE PROPIONATE AND SALMETEROL 500; 50 UG/1; UG/1
1 POWDER RESPIRATORY (INHALATION) EVERY 12 HOURS
Qty: 180 EACH | Refills: 1 | Status: SHIPPED | OUTPATIENT
Start: 2024-12-17

## 2024-12-17 RX ORDER — ALBUTEROL SULFATE 0.83 MG/ML
2.5 SOLUTION RESPIRATORY (INHALATION) EVERY 6 HOURS PRN
Qty: 225 ML | Refills: 2 | Status: SHIPPED | OUTPATIENT
Start: 2024-12-17

## 2024-12-17 NOTE — PROGRESS NOTES
Allergen Reactions    Latex Rash    Contrast [Gadolinium Derivatives] Shortness Of Breath and Anxiety    Iodinated Contrast Media Other (See Comments)     Cant Breathe    Soap Rash       Prior to Visit Medications    Medication Sig Taking? Authorizing Provider   fluticasone-salmeterol (ADVAIR) 250-50 MCG/ACT AEPB diskus inhaler INHALE 1 PUFF INTO THE LUNGS IN THE MORNING AND IN THE EVENING Yes Rayo Paz MD   ALPRAZolam (XANAX) 1 MG tablet TAKE 1/2 TABLET BY MOUTH EVERY DAY AND THEN TAKE 1 TABLET AT BEDTIME Yes Rayo Paz MD   levothyroxine (SYNTHROID) 100 MCG tablet Take 1 tablet by mouth daily Yes Rayo Paz MD   carvedilol (COREG) 12.5 MG tablet TAKE 1 TABLET BY MOUTH TWICE A DAY Yes Rayo Paz MD   atorvastatin (LIPITOR) 40 MG tablet TAKE 1 TABLET BY MOUTH EVERY DAY AT NIGHT Yes Rayo Paz MD   albuterol sulfate HFA (PROAIR HFA) 108 (90 Base) MCG/ACT inhaler Inhale 2 puffs into the lungs every 6 hours as needed for Wheezing or Shortness of Breath Yes Rayo Paz MD   albuterol (PROVENTIL) (2.5 MG/3ML) 0.083% nebulizer solution TAKE 3 MLS BY NEBULIZATION EVERY 6 HOURS AS NEEDED FOR WHEEZING OR SHORTNESS OF BREATH Yes Rayo Paz MD   allopurinol (ZYLOPRIM) 100 MG tablet TAKE 1 TABLET BY MOUTH EVERY DAY Yes Rayo Paz MD   acetaminophen (TYLENOL) 500 MG tablet Take 1 tablet by mouth 4 times daily as needed for Pain Yes Rayo Paz MD   fluticasone (FLONASE) 50 MCG/ACT nasal spray INSTILL 2 SPRAYS IN NOSTRIL EVERY DAY  Patient taking differently: INSTILL 2 SPRAYS IN NOSTRIL EVERY DAY PRN Yes Rayo Paz MD   diclofenac sodium (VOLTAREN) 1 % GEL Apply 2 to 4 g 3 times daily for 2 weeks then twice daily as needed thereafter Yes Junior Mac MD   sacubitril-valsartan (ENTRESTO) 24-26 MG per tablet Take 1 tablet by mouth 2 times daily Yes ProviderTom MD   torsemide (DEMADEX) 20 MG tablet Take 1 tablet by

## 2024-12-21 LAB
6MAM UR QL: NOT DETECTED
7AMINOCLONAZEPAM UR QL: NOT DETECTED
A-OH ALPRAZ UR QL: PRESENT
ALPHA-OH-MIDAZOLAM, URINE: NOT DETECTED
ALPRAZ UR QL: PRESENT
AMPHET UR QL SCN: NOT DETECTED
ANNOTATION COMMENT IMP: NORMAL
ANNOTATION COMMENT IMP: NORMAL
BARBITURATES UR QL: NEGATIVE
BUPRENORPHINE UR QL: NOT DETECTED
BZE UR QL: NEGATIVE
CARBOXYTHC UR QL: NEGATIVE
CARISOPRODOL UR QL: NEGATIVE
CLONAZEPAM UR QL: NOT DETECTED
CODEINE UR QL: NOT DETECTED
CREAT UR-MCNC: 114.6 MG/DL (ref 20–400)
DIAZEPAM UR QL: NOT DETECTED
ETHYL GLUCURONIDE UR QL: NORMAL
FENTANYL UR QL: NOT DETECTED
GABAPENTIN: NOT DETECTED
HYDROCODONE UR QL: NOT DETECTED
HYDROMORPHONE UR QL: NOT DETECTED
LORAZEPAM UR QL: NOT DETECTED
MDA UR QL: NOT DETECTED
MDEA UR QL: NOT DETECTED
MDMA UR QL: NOT DETECTED
MEPERIDINE UR QL: NOT DETECTED
METHADONE UR QL: NEGATIVE
METHAMPHET UR QL: NOT DETECTED
MIDAZOLAM UR QL SCN: NOT DETECTED
MORPHINE UR QL: NOT DETECTED
NALOXONE: NOT DETECTED
NORBUPRENORPHINE UR QL CFM: NOT DETECTED
NORDIAZEPAM UR QL: NOT DETECTED
NORFENTANYL UR QL: NOT DETECTED
NORHYDROCODONE UR QL CFM: NOT DETECTED
NOROXYCODONE UR QL CFM: NOT DETECTED
NOROXYMORPHONE, URINE: NOT DETECTED
OXAZEPAM UR QL: NOT DETECTED
OXYCODONE UR QL: NOT DETECTED
OXYMORPHONE UR QL: NOT DETECTED
PATHOLOGY STUDY: NORMAL
PCP UR QL: NEGATIVE
PHENTERMINE UR QL: NOT DETECTED
PPAA UR QL: NOT DETECTED
PREGABALIN: NOT DETECTED
SERVICE CMNT-IMP: NORMAL
TAPENTADOL UR QL SCN: NOT DETECTED
TAPENTADOL-O-SULFATE, URINE: NOT DETECTED
TEMAZEPAM UR QL: NOT DETECTED
TRAMADOL UR QL: NEGATIVE
ZOLPIDEM UR QL: NOT DETECTED

## 2024-12-24 DIAGNOSIS — F41.1 GENERALIZED ANXIETY DISORDER: ICD-10-CM

## 2024-12-24 RX ORDER — ALPRAZOLAM 1 MG/1
TABLET ORAL
Qty: 45 TABLET | Refills: 0 | Status: SHIPPED | OUTPATIENT
Start: 2024-12-24 | End: 2025-01-24

## 2024-12-24 NOTE — TELEPHONE ENCOUNTER
PDMP Monitoring:    Last PDMP [place] as Reviewed:  Review User Review Instant Review Result   QIANA OLSEN 12/24/2024 10:40 AM Reviewed PDMP [1]     [unfilled]  Urine Drug Screenings (1 yr)       Drug Panel-PM-HI Res-UR Interp-A  Collected: 12/17/2024 11:54 AM (Final result)              Drug Panel-PM-HI Res-UR Interp-A  Collected: 11/29/2023 12:12 PM (Final result)              Drug Panel-PM-HI Res-UR Interp-A  Collected: 11/4/2022 10:03 AM (Final result)              Drug Panel-PM-HI Res-UR Interp-A  Collected: 11/2/2021  9:50 AM (Final result)   Narrative: Referred out by:  [facility]  [address] [place] [address]   Phone [phone]             Drug Panel-PM-HI Res-UR Interp-A  Collected: 11/10/2020 10:01 AM (Final result)   Narrative: Referred out by:  [facility]  [address] [place] [address]   Phone [phone]                 Medication Contract and Consent for Opioid Use Documents Filed       Patient Documents       Type of Document Status Date Received Received By Description    Medication Contract [Status Missing]  IAN SOTO 1- MEDICATION AGREEMENT    Medication Contract Received 8/23/2019  8:54 AM CHERYL IRVING Medication Agreement 8/13/2019    Medication Contract Received 8/18/2020  2:19 PM KATHI HENDERSON medication  contract    Medication Contract Received 8/2/2021 11:36 AM KATERYNA AGRAWAL Medication Agreement    Medication Contract Received 11/4/2022  9:28 AM KATERYNA AGRAWAL Medication Contract 8/4/22    Medication Contract Received 8/25/2023  9:08 AM KATERYNA AGRAWAL Medication Contract 08/24/2023    Medication Contract Received 9/18/2024 12:03 PM PEDRO LUIS ANDERS Medication Contract

## 2024-12-24 NOTE — TELEPHONE ENCOUNTER
Medication:   Requested Prescriptions     Pending Prescriptions Disp Refills    ALPRAZolam (XANAX) 1 MG tablet [Pharmacy Med Name: ALPRAZOLAM 1 MG TABLET] 45 tablet 2     Sig: TAKE 1/2 TABLET BY MOUTH EVERY DAY AND THEN TAKE 1 TABLET AT BEDTIME     Last Filled:  please review if refill is appropriate     Last appt: 12/17/2024   Next appt: 3/17/2025    Last OARRS:       12/17/2024    10:32 AM   RX Monitoring   Periodic Controlled Substance Monitoring No signs of potential drug abuse or diversion identified.

## 2024-12-31 PROBLEM — Z79.899 MEDICATION MANAGEMENT: Status: ACTIVE | Noted: 2024-12-31

## 2024-12-31 ASSESSMENT — ENCOUNTER SYMPTOMS
SINUS PRESSURE: 0
COUGH: 0
TROUBLE SWALLOWING: 0
WHEEZING: 1
CHEST TIGHTNESS: 0
SORE THROAT: 0
VOMITING: 0
RHINORRHEA: 0
CONSTIPATION: 0
DIARRHEA: 0
SHORTNESS OF BREATH: 1
NAUSEA: 0
ABDOMINAL PAIN: 0
SINUS PAIN: 0

## 2024-12-31 NOTE — ASSESSMENT & PLAN NOTE
-Stable  -Continue torsemide 20 mg once daily  -Continue spironolactone 25 mg 1/2 tablet once daily  -Low salt diet  -Continue care per Cardiology

## 2024-12-31 NOTE — ASSESSMENT & PLAN NOTE
-Worsening  -Patient is on diuretics for heart failure  -Avoid NSAID's such as otc Ibuprofen, Advil, Motrin, Naprosyn, and Aleve as well as prescription NSAID's  -Referral to Nephrology

## 2024-12-31 NOTE — ASSESSMENT & PLAN NOTE
-Not controlled  -Increase Advair diskus to 500-50 mcg 1 puff 2 times daily  -Continue albuterol HFA inhaler 2 puffs every 6 hours as needed

## 2025-01-24 ENCOUNTER — COMMUNITY OUTREACH (OUTPATIENT)
Dept: PRIMARY CARE CLINIC | Age: 75
End: 2025-01-24

## 2025-01-24 NOTE — PROGRESS NOTES
Patient's  shows they are overdue for Hemoglobin A1C  Care Everywhere and  files searched.   updated with 3/20/24 A1C result.

## 2025-01-26 DIAGNOSIS — F41.1 GENERALIZED ANXIETY DISORDER: ICD-10-CM

## 2025-01-27 NOTE — TELEPHONE ENCOUNTER
Medication:   Requested Prescriptions     Pending Prescriptions Disp Refills    ALPRAZolam (XANAX) 1 MG tablet [Pharmacy Med Name: ALPRAZOLAM 1 MG TABLET] 45 tablet 0     Sig: TAKE 1/2 TABLET BY MOUTH EVERY DAY AND THEN TAKE 1 TABLET AT BEDTIME     Last filled: 12/24/24  Last appt: 12/17/2024   Next appt: 3/17/2025    Last OARRS:       12/17/2024    10:32 AM   RX Monitoring   Periodic Controlled Substance Monitoring No signs of potential drug abuse or diversion identified.

## 2025-01-29 RX ORDER — ALPRAZOLAM 1 MG/1
TABLET ORAL
Qty: 45 TABLET | Refills: 1 | Status: SHIPPED | OUTPATIENT
Start: 2025-01-29 | End: 2025-03-28

## 2025-01-29 NOTE — TELEPHONE ENCOUNTER
Controlled Substance Monitoring:    Acute and Chronic Pain Monitoring:   RX Monitoring Periodic Controlled Substance Monitoring   1/29/2025  12:46 PM No signs of potential drug abuse or diversion identified.

## 2025-01-30 PROBLEM — N18.32 STAGE 3B CHRONIC KIDNEY DISEASE (HCC): Status: ACTIVE | Noted: 2022-05-17

## 2025-02-09 DIAGNOSIS — E79.0 ELEVATED BLOOD URIC ACID LEVEL: ICD-10-CM

## 2025-02-09 DIAGNOSIS — M10.00 IDIOPATHIC GOUT, UNSPECIFIED CHRONICITY, UNSPECIFIED SITE: ICD-10-CM

## 2025-02-10 RX ORDER — ALLOPURINOL 100 MG/1
100 TABLET ORAL DAILY
Qty: 30 TABLET | Refills: 0 | Status: SHIPPED | OUTPATIENT
Start: 2025-02-10

## 2025-02-10 NOTE — TELEPHONE ENCOUNTER
Medication:   Requested Prescriptions     Pending Prescriptions Disp Refills    allopurinol (ZYLOPRIM) 100 MG tablet [Pharmacy Med Name: ALLOPURINOL 100 MG TABLET] 90 tablet 1     Sig: TAKE 1 TABLET BY MOUTH EVERY DAY     Last filled: 4/25/24  Last appt: 12/17/2024   Next appt: 3/17/2025    Last OARRS:       1/29/2025    12:46 PM   RX Monitoring   Periodic Controlled Substance Monitoring No signs of potential drug abuse or diversion identified.

## 2025-03-16 DIAGNOSIS — E79.0 ELEVATED BLOOD URIC ACID LEVEL: ICD-10-CM

## 2025-03-16 DIAGNOSIS — M10.00 IDIOPATHIC GOUT, UNSPECIFIED CHRONICITY, UNSPECIFIED SITE: ICD-10-CM

## 2025-03-17 ENCOUNTER — OFFICE VISIT (OUTPATIENT)
Dept: PRIMARY CARE CLINIC | Age: 75
End: 2025-03-17
Payer: MEDICARE

## 2025-03-17 VITALS
OXYGEN SATURATION: 95 % | HEIGHT: 67 IN | SYSTOLIC BLOOD PRESSURE: 134 MMHG | BODY MASS INDEX: 33.12 KG/M2 | TEMPERATURE: 96.9 F | WEIGHT: 211 LBS | RESPIRATION RATE: 16 BRPM | DIASTOLIC BLOOD PRESSURE: 82 MMHG | HEART RATE: 74 BPM

## 2025-03-17 DIAGNOSIS — E03.9 ACQUIRED HYPOTHYROIDISM: ICD-10-CM

## 2025-03-17 DIAGNOSIS — I50.22 CHRONIC SYSTOLIC HEART FAILURE (HCC): ICD-10-CM

## 2025-03-17 DIAGNOSIS — I10 ESSENTIAL HYPERTENSION: Primary | ICD-10-CM

## 2025-03-17 DIAGNOSIS — F41.9 ANXIETY: ICD-10-CM

## 2025-03-17 DIAGNOSIS — N18.32 STAGE 3B CHRONIC KIDNEY DISEASE (HCC): ICD-10-CM

## 2025-03-17 DIAGNOSIS — J44.9 CHRONIC OBSTRUCTIVE PULMONARY DISEASE, UNSPECIFIED COPD TYPE (HCC): ICD-10-CM

## 2025-03-17 DIAGNOSIS — R73.03 PREDIABETES: ICD-10-CM

## 2025-03-17 DIAGNOSIS — E78.2 MIXED HYPERLIPIDEMIA: ICD-10-CM

## 2025-03-17 PROCEDURE — 3075F SYST BP GE 130 - 139MM HG: CPT | Performed by: INTERNAL MEDICINE

## 2025-03-17 PROCEDURE — 1123F ACP DISCUSS/DSCN MKR DOCD: CPT | Performed by: INTERNAL MEDICINE

## 2025-03-17 PROCEDURE — 3079F DIAST BP 80-89 MM HG: CPT | Performed by: INTERNAL MEDICINE

## 2025-03-17 PROCEDURE — G2211 COMPLEX E/M VISIT ADD ON: HCPCS | Performed by: INTERNAL MEDICINE

## 2025-03-17 PROCEDURE — 99214 OFFICE O/P EST MOD 30 MIN: CPT | Performed by: INTERNAL MEDICINE

## 2025-03-17 RX ORDER — ALLOPURINOL 100 MG/1
100 TABLET ORAL DAILY
Qty: 90 TABLET | Refills: 1 | Status: SHIPPED | OUTPATIENT
Start: 2025-03-17

## 2025-03-17 RX ORDER — SACUBITRIL AND VALSARTAN 49; 51 MG/1; MG/1
TABLET, FILM COATED ORAL
COMMUNITY
Start: 2025-02-05

## 2025-03-17 RX ORDER — CARVEDILOL 25 MG/1
25 TABLET ORAL 2 TIMES DAILY
COMMUNITY
Start: 2025-02-03

## 2025-03-17 SDOH — ECONOMIC STABILITY: FOOD INSECURITY: WITHIN THE PAST 12 MONTHS, THE FOOD YOU BOUGHT JUST DIDN'T LAST AND YOU DIDN'T HAVE MONEY TO GET MORE.: NEVER TRUE

## 2025-03-17 SDOH — ECONOMIC STABILITY: FOOD INSECURITY: WITHIN THE PAST 12 MONTHS, YOU WORRIED THAT YOUR FOOD WOULD RUN OUT BEFORE YOU GOT MONEY TO BUY MORE.: NEVER TRUE

## 2025-03-17 ASSESSMENT — PATIENT HEALTH QUESTIONNAIRE - PHQ9
3. TROUBLE FALLING OR STAYING ASLEEP: NOT AT ALL
5. POOR APPETITE OR OVEREATING: NOT AT ALL
SUM OF ALL RESPONSES TO PHQ QUESTIONS 1-9: 0
6. FEELING BAD ABOUT YOURSELF - OR THAT YOU ARE A FAILURE OR HAVE LET YOURSELF OR YOUR FAMILY DOWN: NOT AT ALL
9. THOUGHTS THAT YOU WOULD BE BETTER OFF DEAD, OR OF HURTING YOURSELF: NOT AT ALL
2. FEELING DOWN, DEPRESSED OR HOPELESS: NOT AT ALL
SUM OF ALL RESPONSES TO PHQ QUESTIONS 1-9: 0
SUM OF ALL RESPONSES TO PHQ QUESTIONS 1-9: 0
10. IF YOU CHECKED OFF ANY PROBLEMS, HOW DIFFICULT HAVE THESE PROBLEMS MADE IT FOR YOU TO DO YOUR WORK, TAKE CARE OF THINGS AT HOME, OR GET ALONG WITH OTHER PEOPLE: NOT DIFFICULT AT ALL
8. MOVING OR SPEAKING SO SLOWLY THAT OTHER PEOPLE COULD HAVE NOTICED. OR THE OPPOSITE, BEING SO FIGETY OR RESTLESS THAT YOU HAVE BEEN MOVING AROUND A LOT MORE THAN USUAL: NOT AT ALL
7. TROUBLE CONCENTRATING ON THINGS, SUCH AS READING THE NEWSPAPER OR WATCHING TELEVISION: NOT AT ALL
SUM OF ALL RESPONSES TO PHQ QUESTIONS 1-9: 0
1. LITTLE INTEREST OR PLEASURE IN DOING THINGS: NOT AT ALL
4. FEELING TIRED OR HAVING LITTLE ENERGY: NOT AT ALL

## 2025-03-17 NOTE — TELEPHONE ENCOUNTER
Medication:   Requested Prescriptions     Pending Prescriptions Disp Refills    allopurinol (ZYLOPRIM) 100 MG tablet [Pharmacy Med Name: ALLOPURINOL 100 MG TABLET] 90 tablet 1     Sig: TAKE 1 TABLET BY MOUTH EVERY DAY     Last Filled:  02/10/2025    Last appt: 12/17/2024   Next appt: 3/17/2025    Last OARRS:       1/29/2025    12:46 PM   RX Monitoring   Periodic Controlled Substance Monitoring No signs of potential drug abuse or diversion identified.

## 2025-03-17 NOTE — PATIENT INSTRUCTIONS
-Low sodium diet  -Low fat, low cholesterol diet  -low carbohydrate diet  -Regular aerobic exercise to a goal of 150 minutes per week  -work on smoking cessation

## 2025-03-17 NOTE — PROGRESS NOTES
Date of Visit: 3/17/2025    Amilcar Arredondo (:  1950) is a 74 y.o. male,  Established patient here for evaluation of the following chief complaint(s):  Hypertension, Hyperlipidemia, COPD, Hypothyroidism, prediabes, and Anxiety      ASSESSMENT/PLAN:    Assessment & Plan  1. Essential Hypertension:   - stable  - continue carvedilol 25 mg twice daily  - continue Entresto 49-51 mg 2 times daily  - continue Spironolactone half a pill every third day or as needed for swelling  - low sodium diet  - regular aerobic exercise to a goal of 150 minutes per week    2. Mixed Hyperlipidemia:  - stable  - continue atorvastatin 40 mg nightly  - low cholesterol, low fat diet  - regular aerobic exercise to a goal of 150 minutes per week    3. Prediabetes  - stable  - low carbohydrate diet  - regulare aerobic exercise to a goal of 150 minutes per week    4. Anxiety  - stable   - Continue Xanax 1 tablet nightly and 1/2 tablet during the day     5. Acquired hypothyroidism:  - stable  - continue levothyroxine 100 mcg once daily    6. Chronic Obstructive Pulmonary Disease (COPD):  - stable  - continue Advair Diskus 500-50 mcg 1 puff 2 times daily  - Continue albuterol HFA inhaler as needed  - Work on smoking cessation    7.  Chronic Kidney Disease, stage 3b  - stable  - Avoid NSAIDs  - stay hydrated  - Continue care per Nephrology    8. Chronic systolic heart failure  - stable   - continue Entresto 49-51 mg 2 times daily  - continue torsemide 20 mg once daily  - continue spironolactone 25 mg 1/2 tablet once daily  - low sodium diet  - continue care per Cardiology          Return in about 3 months (around 2025) for anxiety, COPD, and hypertension.    SUBJECTIVE:    History of Present Illness  The patient is a 74-year-old male who presents for follow-up of hypertension, hyperlipidemia, anxiety, COPD, hypothyroidism, and prediabetes.    He has been managing his hypertension with carvedilol 25 mg twice daily and Entresto, the

## 2025-03-24 ASSESSMENT — ENCOUNTER SYMPTOMS
NAUSEA: 0
SORE THROAT: 0
SHORTNESS OF BREATH: 0
TROUBLE SWALLOWING: 0
DIARRHEA: 0
SINUS PRESSURE: 0
COUGH: 0
RHINORRHEA: 0
WHEEZING: 0
ABDOMINAL PAIN: 0
CHEST TIGHTNESS: 0
CONSTIPATION: 0
VOMITING: 0
SINUS PAIN: 0

## 2025-04-04 DIAGNOSIS — F41.1 GENERALIZED ANXIETY DISORDER: ICD-10-CM

## 2025-04-04 RX ORDER — ALPRAZOLAM 1 MG/1
TABLET ORAL
Qty: 6 TABLET | Refills: 0 | Status: SHIPPED | OUTPATIENT
Start: 2025-04-04 | End: 2025-04-07

## 2025-04-04 NOTE — TELEPHONE ENCOUNTER
Medication:   Requested Prescriptions     Pending Prescriptions Disp Refills    ALPRAZolam (XANAX) 1 MG tablet [Pharmacy Med Name: ALPRAZOLAM 1 MG TABLET] 45 tablet 1     Sig: TAKE 1/2 TABLET BY MOUTH EVERY DAY AND THEN TAKE 1 TABLET AT BEDTIME     Last Filled:  01/29/2025    Last appt: 3/17/2025   Next appt: 6/18/2025    Last OARRS:       1/29/2025    12:46 PM   RX Monitoring   Periodic Controlled Substance Monitoring No signs of potential drug abuse or diversion identified.

## 2025-04-07 DIAGNOSIS — F41.1 GENERALIZED ANXIETY DISORDER: ICD-10-CM

## 2025-04-07 RX ORDER — ALPRAZOLAM 1 MG/1
TABLET ORAL
Qty: 6 TABLET | Refills: 0 | Status: CANCELLED | OUTPATIENT
Start: 2025-04-07 | End: 2025-04-10

## 2025-04-07 NOTE — TELEPHONE ENCOUNTER
Patient called in for med refill. please contact patient when completed.   ALPRAZolam (XANAX) 1 MG tablet [[id number]]   [facility]/pharmacy #[id number] - [place], [place] - [address] -  [phone] - F [phone]  [address] [address]  Phone: [phone]  Fax: [phone]

## 2025-04-07 NOTE — TELEPHONE ENCOUNTER
Medication:   Requested Prescriptions     Pending Prescriptions Disp Refills    ALPRAZolam (XANAX) 1 MG tablet 6 tablet 0     Sig: TAKE 1/2 TABLET BY MOUTH EVERY DAY AND THEN TAKE 1 TABLET AT BEDTIME     Last Filled:  04/04/2025    Last appt: 3/17/2025   Next appt: 6/18/2025    Last OARRS:       1/29/2025    12:46 PM   RX Monitoring   Periodic Controlled Substance Monitoring No signs of potential drug abuse or diversion identified.      no

## 2025-04-09 NOTE — TELEPHONE ENCOUNTER
Patient called and stated to  inform that  he only received 6 tabs 0n 04/04 by Dr Barboza.    He is out of it now.he requested to send in urgently normal dispense quantity which is 45 tabs.    The pharmacy    [facility]/pharmacy #[id number] - [place], [place] - [address] - P [phone] - F [phone]  [address] [address]  Phone: [phone]  Fax: [phone]       Pl review    thanks

## 2025-04-10 RX ORDER — ALPRAZOLAM 1 MG/1
TABLET ORAL
Qty: 45 TABLET | Refills: 2 | Status: SHIPPED | OUTPATIENT
Start: 2025-04-10 | End: 2025-06-07

## 2025-04-10 NOTE — TELEPHONE ENCOUNTER
Call patient. I refilled Xanax.      Controlled Substance Monitoring:    Acute and Chronic Pain Monitoring:   RX Monitoring Periodic Controlled Substance Monitoring   4/10/2025  12:23 PM No signs of potential drug abuse or diversion identified.

## 2025-04-10 NOTE — TELEPHONE ENCOUNTER
This is the 3rd Call from Patient.    Please call him if the request cannot be completed today.    Patient called and stated to  inform that  he only received 6 tabs 0n 04/04 by Dr Barboza.     He is out of it now.he requested to send in urgently normal dispense quantity which is 45 tabs.     The pharmacy     [facility]/pharmacy #[id number] - [place], [place] - [address] - P [phone] - F [phone]  [address] [address]  Phone: [phone]  Fax: [phone]         Pl review     thanks

## 2025-06-11 DIAGNOSIS — E03.9 ACQUIRED HYPOTHYROIDISM: ICD-10-CM

## 2025-06-11 DIAGNOSIS — J44.9 CHRONIC OBSTRUCTIVE PULMONARY DISEASE, UNSPECIFIED COPD TYPE (HCC): ICD-10-CM

## 2025-06-11 RX ORDER — LEVOTHYROXINE SODIUM 100 UG/1
100 TABLET ORAL DAILY
Qty: 90 TABLET | Refills: 1 | Status: SHIPPED | OUTPATIENT
Start: 2025-06-11

## 2025-06-11 RX ORDER — FLUTICASONE PROPIONATE AND SALMETEROL 500; 50 UG/1; UG/1
POWDER RESPIRATORY (INHALATION)
Qty: 180 EACH | Refills: 1 | Status: SHIPPED | OUTPATIENT
Start: 2025-06-11

## 2025-06-11 NOTE — TELEPHONE ENCOUNTER
Medication:   Requested Prescriptions     Pending Prescriptions Disp Refills    levothyroxine (SYNTHROID) 100 MCG tablet [Pharmacy Med Name: LEVOTHYROXINE 100 MCG TABLET] 90 tablet 1     Sig: TAKE 1 TABLET BY MOUTH EVERY DAY    fluticasone-salmeterol (ADVAIR) 500-50 MCG/ACT AEPB diskus inhaler [Pharmacy Med Name: FLUTICASONE-SALMETEROL 500-50] 180 each 1     Sig: INHALE 1 PUFF INTO THE LUNGS IN THE MORNING AND IN THE EVENING     Last Filled:  9/17/24    Last appt: 3/17/2025   Next appt: 6/18/2025    Last Thyroid:   Lab Results   Component Value Date/Time    TSH 3.145 12/16/2024 07:18 AM    TSH 3.75 05/30/2023 09:19 AM    TSH 1.31 08/22/2013 10:53 AM    T4FREE 1.27 06/29/2018 10:05 AM

## 2025-06-18 ENCOUNTER — OFFICE VISIT (OUTPATIENT)
Dept: PRIMARY CARE CLINIC | Age: 75
End: 2025-06-18
Payer: MEDICARE

## 2025-06-18 VITALS
RESPIRATION RATE: 16 BRPM | OXYGEN SATURATION: 93 % | SYSTOLIC BLOOD PRESSURE: 138 MMHG | WEIGHT: 206 LBS | HEIGHT: 67 IN | DIASTOLIC BLOOD PRESSURE: 88 MMHG | HEART RATE: 57 BPM | BODY MASS INDEX: 32.33 KG/M2 | TEMPERATURE: 97.3 F

## 2025-06-18 DIAGNOSIS — R73.03 PREDIABETES: ICD-10-CM

## 2025-06-18 DIAGNOSIS — Z12.5 SCREENING PSA (PROSTATE SPECIFIC ANTIGEN): ICD-10-CM

## 2025-06-18 DIAGNOSIS — I10 ESSENTIAL HYPERTENSION: ICD-10-CM

## 2025-06-18 DIAGNOSIS — F41.1 GENERALIZED ANXIETY DISORDER: Primary | ICD-10-CM

## 2025-06-18 DIAGNOSIS — J30.9 ALLERGIC RHINITIS, UNSPECIFIED SEASONALITY, UNSPECIFIED TRIGGER: ICD-10-CM

## 2025-06-18 DIAGNOSIS — J44.9 CHRONIC OBSTRUCTIVE PULMONARY DISEASE, UNSPECIFIED COPD TYPE (HCC): ICD-10-CM

## 2025-06-18 DIAGNOSIS — E03.9 ACQUIRED HYPOTHYROIDISM: ICD-10-CM

## 2025-06-18 DIAGNOSIS — I10 ESSENTIAL HYPERTENSION: Primary | ICD-10-CM

## 2025-06-18 DIAGNOSIS — E78.2 MIXED HYPERLIPIDEMIA: ICD-10-CM

## 2025-06-18 PROCEDURE — 1123F ACP DISCUSS/DSCN MKR DOCD: CPT | Performed by: INTERNAL MEDICINE

## 2025-06-18 PROCEDURE — 3075F SYST BP GE 130 - 139MM HG: CPT | Performed by: INTERNAL MEDICINE

## 2025-06-18 PROCEDURE — 1160F RVW MEDS BY RX/DR IN RCRD: CPT | Performed by: INTERNAL MEDICINE

## 2025-06-18 PROCEDURE — 3079F DIAST BP 80-89 MM HG: CPT | Performed by: INTERNAL MEDICINE

## 2025-06-18 PROCEDURE — 99214 OFFICE O/P EST MOD 30 MIN: CPT | Performed by: INTERNAL MEDICINE

## 2025-06-18 PROCEDURE — 1159F MED LIST DOCD IN RCRD: CPT | Performed by: INTERNAL MEDICINE

## 2025-06-18 PROCEDURE — G2211 COMPLEX E/M VISIT ADD ON: HCPCS | Performed by: INTERNAL MEDICINE

## 2025-06-18 RX ORDER — TIOTROPIUM BROMIDE 18 UG/1
18 CAPSULE ORAL; RESPIRATORY (INHALATION) DAILY
Qty: 90 CAPSULE | Refills: 1 | Status: SHIPPED | OUTPATIENT
Start: 2025-06-18

## 2025-06-18 RX ORDER — FLUTICASONE PROPIONATE 50 MCG
2 SPRAY, SUSPENSION (ML) NASAL DAILY
Qty: 48 G | Refills: 0 | Status: SHIPPED | OUTPATIENT
Start: 2025-06-18

## 2025-06-18 SDOH — ECONOMIC STABILITY: FOOD INSECURITY: WITHIN THE PAST 12 MONTHS, THE FOOD YOU BOUGHT JUST DIDN'T LAST AND YOU DIDN'T HAVE MONEY TO GET MORE.: NEVER TRUE

## 2025-06-18 SDOH — ECONOMIC STABILITY: FOOD INSECURITY: WITHIN THE PAST 12 MONTHS, YOU WORRIED THAT YOUR FOOD WOULD RUN OUT BEFORE YOU GOT MONEY TO BUY MORE.: NEVER TRUE

## 2025-06-18 ASSESSMENT — PATIENT HEALTH QUESTIONNAIRE - PHQ9
2. FEELING DOWN, DEPRESSED OR HOPELESS: NOT AT ALL
SUM OF ALL RESPONSES TO PHQ QUESTIONS 1-9: 0
5. POOR APPETITE OR OVEREATING: NOT AT ALL
SUM OF ALL RESPONSES TO PHQ QUESTIONS 1-9: 0
6. FEELING BAD ABOUT YOURSELF - OR THAT YOU ARE A FAILURE OR HAVE LET YOURSELF OR YOUR FAMILY DOWN: NOT AT ALL
4. FEELING TIRED OR HAVING LITTLE ENERGY: NOT AT ALL
9. THOUGHTS THAT YOU WOULD BE BETTER OFF DEAD, OR OF HURTING YOURSELF: NOT AT ALL
SUM OF ALL RESPONSES TO PHQ QUESTIONS 1-9: 0
10. IF YOU CHECKED OFF ANY PROBLEMS, HOW DIFFICULT HAVE THESE PROBLEMS MADE IT FOR YOU TO DO YOUR WORK, TAKE CARE OF THINGS AT HOME, OR GET ALONG WITH OTHER PEOPLE: NOT DIFFICULT AT ALL
3. TROUBLE FALLING OR STAYING ASLEEP: NOT AT ALL
1. LITTLE INTEREST OR PLEASURE IN DOING THINGS: NOT AT ALL
7. TROUBLE CONCENTRATING ON THINGS, SUCH AS READING THE NEWSPAPER OR WATCHING TELEVISION: NOT AT ALL
SUM OF ALL RESPONSES TO PHQ QUESTIONS 1-9: 0
8. MOVING OR SPEAKING SO SLOWLY THAT OTHER PEOPLE COULD HAVE NOTICED. OR THE OPPOSITE, BEING SO FIGETY OR RESTLESS THAT YOU HAVE BEEN MOVING AROUND A LOT MORE THAN USUAL: NOT AT ALL

## 2025-06-18 NOTE — PROGRESS NOTES
Date of Visit: 2025    Amilcar Arredondo (:  1950) is a 74 y.o. male,  Established patient here for evaluation of the following chief complaint(s):  Anxiety, COPD, and Hypertension      ASSESSMENT/PLAN:    Assessment & Plan  1. Generalized Anxiety disorder:  - stable  - Continue Xanax 1 tablet nightly and 1/2 tablet during the day  - relaxation techniques and meditation apps such as Calm    2. Chronic obstructive pulmonary disease (COPD):  - not controlled  - Continues to use Advair Diskus, 1 puff in the morning and 1 in the evening  - start Spiriva inhaler 18 mcg 1 puff  nightly  - Encouraged to work on smoking cessation  - Recommend referral to a pulmonologist and patient would like to see a pulmonologist in [place] where he lives  - patient to contact me with pulmonologist information for referral    3. Essential Hypertension:  - stable  - Continue carvedilol 25 mg twice daily  - Continue Entresto 49-51 mg twice daily  - Continue spironolactone 25 mg half tablet every third or fourth day as needed for heart failure management as managed by cardiology  - low sodium diet  - regular aerobic exercise    4. Allergic rhinitis  - stable  - continue flonase nasal spray 2 sprays each nostril once daily            Return in about 3 months (around 2025) for Medicare AW.    SUBJECTIVE:    History of Present Illness  The patient presents for a follow-up on anxiety, COPD, and hypertension.    He manages his anxiety with alprazolam, taken at night, and occasionally increases the dose when necessary. He has previously tried antidepressants but found them ineffective. He does not believe counseling would be beneficial, as he has already undergone therapy during his college years. He experiences panic attacks infrequently but finds them distressing.     He has been experiencing respiratory distress and congestion, which he attributes to environmental smoke. He reports using albuterol more frequently in the

## 2025-07-01 PROBLEM — J30.9 ALLERGIC RHINITIS: Status: ACTIVE | Noted: 2025-07-01

## 2025-07-01 ASSESSMENT — ENCOUNTER SYMPTOMS
SHORTNESS OF BREATH: 1
CHEST TIGHTNESS: 0
SORE THROAT: 0
COUGH: 0
NAUSEA: 0
TROUBLE SWALLOWING: 0
SINUS PAIN: 0
WHEEZING: 0
ABDOMINAL PAIN: 0
CONSTIPATION: 0
VOMITING: 0
SINUS PRESSURE: 0
DIARRHEA: 0

## 2025-07-08 DIAGNOSIS — F41.1 GENERALIZED ANXIETY DISORDER: ICD-10-CM

## 2025-07-08 RX ORDER — ALPRAZOLAM 1 MG/1
TABLET ORAL
Qty: 45 TABLET | Refills: 0 | Status: SHIPPED | OUTPATIENT
Start: 2025-07-08 | End: 2025-08-29

## 2025-07-08 NOTE — TELEPHONE ENCOUNTER
Medication:   Requested Prescriptions     Pending Prescriptions Disp Refills    ALPRAZolam (XANAX) 1 MG tablet [Pharmacy Med Name: ALPRAZOLAM 1 MG TABLET] 45 tablet 0     Sig: TAKE 1/2 TABLET BY MOUTH EVERY DAY AND THEN TAKE 1 TABLET AT BEDTIME     Last filled: 4/10/25  Last appt: 6/18/2025   Next appt: 9/22/2025    Last OARRS:       6/18/2025    11:15 AM   RX Monitoring   Periodic Controlled Substance Monitoring No signs of potential drug abuse or diversion identified.

## 2025-07-08 NOTE — TELEPHONE ENCOUNTER
PDMP Monitoring:    Last PDMP [place] as Reviewed:  Review User Review Instant Review Result   QIANA OLSEN 7/8/2025  1:58 PM Reviewed PDMP [1]     [unfilled]  Urine Drug Screenings (1 yr)       Drug Panel-PM-HI Res-UR Interp-A  Collected: 12/17/2024 11:54 AM (Final result)              Drug Panel-PM-HI Res-UR Interp-A  Collected: 11/29/2023 12:12 PM (Final result)              Drug Panel-PM-HI Res-UR Interp-A  Collected: 11/4/2022 10:03 AM (Final result)              Drug Panel-PM-HI Res-UR Interp-A  Collected: 11/2/2021  9:50 AM (Final result)   Narrative: Referred out by:  [facility]  [address] [place] [address]   Phone [phone]             Drug Panel-PM-HI Res-UR Interp-A  Collected: 11/10/2020 10:01 AM (Final result)   Narrative: Referred out by:  [facility]  [address] [place] [address]   Phone [phone]                 Medication Contract and Consent for Opioid Use Documents Filed       Patient Documents       Type of Document Status Date Received Received By Description    Medication Contract [Status Missing]  IAN SOTO 1- MEDICATION AGREEMENT    Medication Contract Received 8/23/2019  8:54 AM CHERYL IRVING Medication Agreement 8/13/2019    Medication Contract Received 8/18/2020  2:19 PM KATHI HENDERSON medication  contract    Medication Contract Received 8/2/2021 11:36 AM KATERYNA AGRAWAL Medication Agreement    Medication Contract Received 11/4/2022  9:28 AM KATERYNA AGRAWAL Medication Contract 8/4/22    Medication Contract Received 8/25/2023  9:08 AM KATERYNA AGRAWAL Medication Contract 08/24/2023    Medication Contract Received 9/18/2024 12:03 PM PEDRO LUIS ANDERS Medication Contract

## 2025-07-14 DIAGNOSIS — M10.00 IDIOPATHIC GOUT, UNSPECIFIED CHRONICITY, UNSPECIFIED SITE: ICD-10-CM

## 2025-07-14 DIAGNOSIS — E79.0 ELEVATED BLOOD URIC ACID LEVEL: ICD-10-CM

## 2025-07-14 RX ORDER — ALLOPURINOL 100 MG/1
100 TABLET ORAL DAILY
Qty: 90 TABLET | Refills: 1 | OUTPATIENT
Start: 2025-07-14

## 2025-07-17 ENCOUNTER — CARE COORDINATION (OUTPATIENT)
Dept: CARE COORDINATION | Age: 75
End: 2025-07-17

## 2025-07-17 NOTE — CARE COORDINATION
Ambulatory Care Coordination Note     7/17/2025 3:28 PM     Patient outreach attempt by this ACM today to offer care management services. ACM was unable to reach the patient by telephone today;   left voice message requesting a return phone call to this ACM.     ACM: Angy Barrett RN     Care Summary Note: PMH: CHF, COPD, HTN - UTRx1    PCP/Specialist follow up:   Future Appointments         Provider Specialty Dept Phone    9/22/2025 10:00 AM Rayo Paz MD Primary Care [phone]            Follow Up:   Plan for next ACM outreach in approximately 1 week to complete:  - outreach attempt to offer care management services.           Angy DALY, RN     Ambulatory Care Manager    [facility]    Phone: [phone]    [email or web address]

## 2025-07-22 ENCOUNTER — CARE COORDINATION (OUTPATIENT)
Dept: CARE COORDINATION | Age: 75
End: 2025-07-22

## 2025-07-22 NOTE — CARE COORDINATION
Ambulatory Care Coordination Note     7/22/2025 9:08 AM     patient outreach attempt by this ACM today to offer care management services. ACM was unable to reach the patient by telephone today;   left voice message requesting a return phone call to this ACM.  [employer] message sent requesting patient to contact this ACM.     Patient closed (unable to reach patient) from the High Risk Care Management program on 7/22/2025.  No further Ambulatory Care Manager follow up scheduled.       Angy DALY, RN     Ambulatory Care Manager    [facility]    Phone: [phone]    [email or web address]

## 2025-08-07 DIAGNOSIS — F41.1 GENERALIZED ANXIETY DISORDER: ICD-10-CM

## 2025-08-08 RX ORDER — ALPRAZOLAM 1 MG/1
TABLET ORAL
Qty: 45 TABLET | Refills: 1 | Status: SHIPPED | OUTPATIENT
Start: 2025-08-08 | End: 2025-10-07